# Patient Record
Sex: FEMALE | NOT HISPANIC OR LATINO | URBAN - METROPOLITAN AREA
[De-identification: names, ages, dates, MRNs, and addresses within clinical notes are randomized per-mention and may not be internally consistent; named-entity substitution may affect disease eponyms.]

---

## 2017-11-28 ENCOUNTER — EMERGENCY (EMERGENCY)
Facility: HOSPITAL | Age: 77
LOS: 1 days | Discharge: HOME | End: 2017-11-28
Attending: EMERGENCY MEDICINE | Admitting: EMERGENCY MEDICINE
Payer: MEDICARE

## 2017-11-28 VITALS
HEART RATE: 92 BPM | TEMPERATURE: 98 F | DIASTOLIC BLOOD PRESSURE: 54 MMHG | RESPIRATION RATE: 18 BRPM | OXYGEN SATURATION: 100 % | SYSTOLIC BLOOD PRESSURE: 124 MMHG

## 2017-11-28 VITALS
OXYGEN SATURATION: 98 % | DIASTOLIC BLOOD PRESSURE: 69 MMHG | SYSTOLIC BLOOD PRESSURE: 98 MMHG | HEART RATE: 94 BPM | TEMPERATURE: 98 F | RESPIRATION RATE: 18 BRPM

## 2017-11-28 LAB
ALBUMIN SERPL ELPH-MCNC: 2 G/DL — LOW (ref 3.5–5)
ALP SERPL-CCNC: 342 U/L — HIGH (ref 40–120)
ALT FLD-CCNC: 48 U/L DA — SIGNIFICANT CHANGE UP (ref 10–60)
ANION GAP SERPL CALC-SCNC: 8 MMOL/L — SIGNIFICANT CHANGE UP (ref 5–17)
ANISOCYTOSIS BLD QL: SLIGHT — SIGNIFICANT CHANGE UP
APPEARANCE UR: CLEAR — SIGNIFICANT CHANGE UP
AST SERPL-CCNC: 129 U/L — HIGH (ref 10–40)
BACTERIA # UR AUTO: ABNORMAL /HPF
BILIRUB SERPL-MCNC: 0.9 MG/DL — SIGNIFICANT CHANGE UP (ref 0.2–1.2)
BILIRUB UR-MCNC: ABNORMAL
BUN SERPL-MCNC: 20 MG/DL — HIGH (ref 7–18)
CALCIUM SERPL-MCNC: 8.6 MG/DL — SIGNIFICANT CHANGE UP (ref 8.4–10.5)
CHLORIDE SERPL-SCNC: 104 MMOL/L — SIGNIFICANT CHANGE UP (ref 96–108)
CO2 SERPL-SCNC: 24 MMOL/L — SIGNIFICANT CHANGE UP (ref 22–31)
COLOR SPEC: ABNORMAL
COMMENT - URINE: SIGNIFICANT CHANGE UP
COMMENT - URINE: SIGNIFICANT CHANGE UP
CREAT SERPL-MCNC: 0.62 MG/DL — SIGNIFICANT CHANGE UP (ref 0.5–1.3)
DIFF PNL FLD: ABNORMAL
EPI CELLS # UR: SIGNIFICANT CHANGE UP /HPF
GLUCOSE SERPL-MCNC: 105 MG/DL — HIGH (ref 70–99)
GLUCOSE UR QL: NEGATIVE — SIGNIFICANT CHANGE UP
GRAN CASTS # UR COMP ASSIST: ABNORMAL /LPF
HCT VFR BLD CALC: 32.3 % — LOW (ref 34.5–45)
HGB BLD-MCNC: 10.3 G/DL — LOW (ref 11.5–15.5)
KETONES UR-MCNC: ABNORMAL
LACTATE SERPL-SCNC: 1.2 MMOL/L — SIGNIFICANT CHANGE UP (ref 0.7–2)
LEUKOCYTE ESTERASE UR-ACNC: ABNORMAL
LYMPHOCYTES # BLD AUTO: 4 % — LOW (ref 13–44)
MANUAL DIF COMMENT BLD-IMP: SIGNIFICANT CHANGE UP
MCHC RBC-ENTMCNC: 28.6 PG — SIGNIFICANT CHANGE UP (ref 27–34)
MCHC RBC-ENTMCNC: 31.9 GM/DL — LOW (ref 32–36)
MCV RBC AUTO: 89.7 FL — SIGNIFICANT CHANGE UP (ref 80–100)
MONOCYTES NFR BLD AUTO: 4 % — SIGNIFICANT CHANGE UP (ref 2–14)
NEUTROPHILS NFR BLD AUTO: 90 % — HIGH (ref 43–77)
NEUTS BAND # BLD: 1 % — SIGNIFICANT CHANGE UP (ref 0–8)
NITRITE UR-MCNC: NEGATIVE — SIGNIFICANT CHANGE UP
PH UR: 6 — SIGNIFICANT CHANGE UP (ref 5–8)
PLAT MORPH BLD: NORMAL — SIGNIFICANT CHANGE UP
PLATELET # BLD AUTO: 635 K/UL — HIGH (ref 150–400)
POIKILOCYTOSIS BLD QL AUTO: SLIGHT — SIGNIFICANT CHANGE UP
POTASSIUM SERPL-MCNC: 3.9 MMOL/L — SIGNIFICANT CHANGE UP (ref 3.5–5.3)
POTASSIUM SERPL-SCNC: 3.9 MMOL/L — SIGNIFICANT CHANGE UP (ref 3.5–5.3)
PROT SERPL-MCNC: 6.7 G/DL — SIGNIFICANT CHANGE UP (ref 6–8.3)
PROT UR-MCNC: 30 MG/DL
RBC # BLD: 3.6 M/UL — LOW (ref 3.8–5.2)
RBC # FLD: 13.6 % — SIGNIFICANT CHANGE UP (ref 10.3–14.5)
RBC BLD AUTO: ABNORMAL
RBC CASTS # UR COMP ASSIST: SIGNIFICANT CHANGE UP /HPF (ref 0–2)
SODIUM SERPL-SCNC: 136 MMOL/L — SIGNIFICANT CHANGE UP (ref 135–145)
SP GR SPEC: 1.01 — SIGNIFICANT CHANGE UP (ref 1.01–1.02)
UROBILINOGEN FLD QL: 4
VARIANT LYMPHS # BLD: 1 % — SIGNIFICANT CHANGE UP (ref 0–6)
WBC # BLD: 24.4 K/UL — HIGH (ref 3.8–10.5)
WBC # FLD AUTO: 24.4 K/UL — HIGH (ref 3.8–10.5)
WBC UR QL: ABNORMAL /HPF (ref 0–5)

## 2017-11-28 PROCEDURE — 96374 THER/PROPH/DIAG INJ IV PUSH: CPT

## 2017-11-28 PROCEDURE — 99284 EMERGENCY DEPT VISIT MOD MDM: CPT

## 2017-11-28 PROCEDURE — 99284 EMERGENCY DEPT VISIT MOD MDM: CPT | Mod: 25

## 2017-11-28 PROCEDURE — 87040 BLOOD CULTURE FOR BACTERIA: CPT

## 2017-11-28 PROCEDURE — 85027 COMPLETE CBC AUTOMATED: CPT

## 2017-11-28 PROCEDURE — 80053 COMPREHEN METABOLIC PANEL: CPT

## 2017-11-28 PROCEDURE — 83605 ASSAY OF LACTIC ACID: CPT

## 2017-11-28 PROCEDURE — 71045 X-RAY EXAM CHEST 1 VIEW: CPT

## 2017-11-28 PROCEDURE — 71010: CPT | Mod: 26

## 2017-11-28 PROCEDURE — 81001 URINALYSIS AUTO W/SCOPE: CPT

## 2017-11-28 PROCEDURE — 87086 URINE CULTURE/COLONY COUNT: CPT

## 2017-11-28 PROCEDURE — 93005 ELECTROCARDIOGRAM TRACING: CPT

## 2017-11-28 RX ORDER — SODIUM CHLORIDE 9 MG/ML
2000 INJECTION INTRAMUSCULAR; INTRAVENOUS; SUBCUTANEOUS ONCE
Qty: 0 | Refills: 0 | Status: COMPLETED | OUTPATIENT
Start: 2017-11-28 | End: 2017-11-28

## 2017-11-28 RX ORDER — CEFTRIAXONE 500 MG/1
1 INJECTION, POWDER, FOR SOLUTION INTRAMUSCULAR; INTRAVENOUS ONCE
Qty: 0 | Refills: 0 | Status: COMPLETED | OUTPATIENT
Start: 2017-11-28 | End: 2017-11-28

## 2017-11-28 RX ORDER — SODIUM CHLORIDE 9 MG/ML
1000 INJECTION INTRAMUSCULAR; INTRAVENOUS; SUBCUTANEOUS ONCE
Qty: 0 | Refills: 0 | Status: COMPLETED | OUTPATIENT
Start: 2017-11-28 | End: 2017-11-28

## 2017-11-28 RX ORDER — CEFUROXIME AXETIL 250 MG
1 TABLET ORAL
Qty: 12 | Refills: 0 | OUTPATIENT
Start: 2017-11-28 | End: 2017-12-04

## 2017-11-28 RX ADMIN — SODIUM CHLORIDE 1000 MILLILITER(S): 9 INJECTION INTRAMUSCULAR; INTRAVENOUS; SUBCUTANEOUS at 15:06

## 2017-11-28 RX ADMIN — SODIUM CHLORIDE 2000 MILLILITER(S): 9 INJECTION INTRAMUSCULAR; INTRAVENOUS; SUBCUTANEOUS at 12:45

## 2017-11-28 RX ADMIN — CEFTRIAXONE 100 GRAM(S): 500 INJECTION, POWDER, FOR SOLUTION INTRAMUSCULAR; INTRAVENOUS at 15:06

## 2017-11-28 NOTE — ED PROVIDER NOTE - PROGRESS NOTE DETAILS
signed out to me. UTI with hypotension corrected with hydration and ceftriaxone x 1 dose. Re-eval by me. Patient asymptomatic and feels at baseline. d/c with 6 more days of ceftin.

## 2017-11-28 NOTE — ED ADULT NURSE NOTE - CAS EDN DISCHARGE ASSESSMENT
Awake/No adverse reaction to first time med in ED/Symptoms improved/Alert and oriented to person, place and time

## 2017-11-28 NOTE — ED ADULT TRIAGE NOTE - CHIEF COMPLAINT QUOTE
via ambulance sent from pmd office for dehydration , hypotension . daughter reports pt w/ decreased appetite , dry mouth  for 2 weeks

## 2017-11-28 NOTE — ED PROVIDER NOTE - OBJECTIVE STATEMENT
78 y/o F pt w/ PMHx of HLD, hypothyroidism, migraines, presents to ED c/o dehydration and hypotension x 2 weeks.  Pt reports she wasn't feeling well 3 weeks ago, was prescribed Prednisone, but has had symptoms since. Pt denies fever, chills, nausea, vomiting, diarrhea, or any other complaints. NKDA.

## 2017-11-29 LAB
CULTURE RESULTS: SIGNIFICANT CHANGE UP
SPECIMEN SOURCE: SIGNIFICANT CHANGE UP

## 2017-12-04 ENCOUNTER — INPATIENT (INPATIENT)
Facility: HOSPITAL | Age: 77
LOS: 1 days | Discharge: ANOTHER HOSPITAL | DRG: 871 | End: 2017-12-06
Attending: HOSPITALIST | Admitting: HOSPITALIST
Payer: MEDICARE

## 2017-12-04 VITALS
RESPIRATION RATE: 16 BRPM | TEMPERATURE: 98 F | SYSTOLIC BLOOD PRESSURE: 94 MMHG | DIASTOLIC BLOOD PRESSURE: 60 MMHG | HEART RATE: 95 BPM | WEIGHT: 115.08 LBS | HEIGHT: 61 IN | OXYGEN SATURATION: 99 %

## 2017-12-04 DIAGNOSIS — E78.5 HYPERLIPIDEMIA, UNSPECIFIED: ICD-10-CM

## 2017-12-04 DIAGNOSIS — R53.1 WEAKNESS: ICD-10-CM

## 2017-12-04 DIAGNOSIS — Z29.9 ENCOUNTER FOR PROPHYLACTIC MEASURES, UNSPECIFIED: ICD-10-CM

## 2017-12-04 DIAGNOSIS — I24.9 ACUTE ISCHEMIC HEART DISEASE, UNSPECIFIED: ICD-10-CM

## 2017-12-04 DIAGNOSIS — E03.9 HYPOTHYROIDISM, UNSPECIFIED: ICD-10-CM

## 2017-12-04 LAB
ALBUMIN SERPL ELPH-MCNC: 1.7 G/DL — LOW (ref 3.5–5)
ALP SERPL-CCNC: 372 U/L — HIGH (ref 40–120)
ALT FLD-CCNC: 15 U/L DA — SIGNIFICANT CHANGE UP (ref 10–60)
ANION GAP SERPL CALC-SCNC: 9 MMOL/L — SIGNIFICANT CHANGE UP (ref 5–17)
APPEARANCE UR: CLEAR — SIGNIFICANT CHANGE UP
APTT BLD: 28.9 SEC — SIGNIFICANT CHANGE UP (ref 27.5–37.4)
AST SERPL-CCNC: 76 U/L — HIGH (ref 10–40)
BILIRUB SERPL-MCNC: 0.4 MG/DL — SIGNIFICANT CHANGE UP (ref 0.2–1.2)
BILIRUB UR-MCNC: NEGATIVE — SIGNIFICANT CHANGE UP
BUN SERPL-MCNC: 14 MG/DL — SIGNIFICANT CHANGE UP (ref 7–18)
CALCIUM SERPL-MCNC: 7.8 MG/DL — LOW (ref 8.4–10.5)
CHLORIDE SERPL-SCNC: 105 MMOL/L — SIGNIFICANT CHANGE UP (ref 96–108)
CK MB BLD-MCNC: <4.3 % — HIGH (ref 0–3.5)
CK MB CFR SERPL CALC: <1 NG/ML — SIGNIFICANT CHANGE UP (ref 0–3.6)
CK SERPL-CCNC: 23 U/L — SIGNIFICANT CHANGE UP (ref 21–215)
CO2 SERPL-SCNC: 23 MMOL/L — SIGNIFICANT CHANGE UP (ref 22–31)
COLOR SPEC: YELLOW — SIGNIFICANT CHANGE UP
CREAT SERPL-MCNC: 0.82 MG/DL — SIGNIFICANT CHANGE UP (ref 0.5–1.3)
CULTURE RESULTS: SIGNIFICANT CHANGE UP
CULTURE RESULTS: SIGNIFICANT CHANGE UP
DIFF PNL FLD: ABNORMAL
GLUCOSE SERPL-MCNC: 147 MG/DL — HIGH (ref 70–99)
GLUCOSE UR QL: NEGATIVE — SIGNIFICANT CHANGE UP
HCT VFR BLD CALC: 34.1 % — LOW (ref 34.5–45)
HGB BLD-MCNC: 10.6 G/DL — LOW (ref 11.5–15.5)
INR BLD: 1.36 RATIO — HIGH (ref 0.88–1.16)
KETONES UR-MCNC: NEGATIVE — SIGNIFICANT CHANGE UP
LACTATE SERPL-SCNC: 1.8 MMOL/L — SIGNIFICANT CHANGE UP (ref 0.7–2)
LEUKOCYTE ESTERASE UR-ACNC: ABNORMAL
LYMPHOCYTES # BLD AUTO: 6 % — LOW (ref 13–44)
MCHC RBC-ENTMCNC: 27.4 PG — SIGNIFICANT CHANGE UP (ref 27–34)
MCHC RBC-ENTMCNC: 31.1 GM/DL — LOW (ref 32–36)
MCV RBC AUTO: 87.9 FL — SIGNIFICANT CHANGE UP (ref 80–100)
MONOCYTES NFR BLD AUTO: 7 % — SIGNIFICANT CHANGE UP (ref 2–14)
NEUTROPHILS NFR BLD AUTO: 86 % — HIGH (ref 43–77)
NITRITE UR-MCNC: NEGATIVE — SIGNIFICANT CHANGE UP
PH UR: 6 — SIGNIFICANT CHANGE UP (ref 5–8)
PLATELET # BLD AUTO: 194 K/UL — SIGNIFICANT CHANGE UP (ref 150–400)
POTASSIUM SERPL-MCNC: 3.9 MMOL/L — SIGNIFICANT CHANGE UP (ref 3.5–5.3)
POTASSIUM SERPL-SCNC: 3.9 MMOL/L — SIGNIFICANT CHANGE UP (ref 3.5–5.3)
PROT SERPL-MCNC: 6.3 G/DL — SIGNIFICANT CHANGE UP (ref 6–8.3)
PROT UR-MCNC: 30 MG/DL
PROTHROM AB SERPL-ACNC: 14.9 SEC — HIGH (ref 9.8–12.7)
RBC # BLD: 3.88 M/UL — SIGNIFICANT CHANGE UP (ref 3.8–5.2)
RBC # FLD: 14.9 % — HIGH (ref 10.3–14.5)
SODIUM SERPL-SCNC: 137 MMOL/L — SIGNIFICANT CHANGE UP (ref 135–145)
SP GR SPEC: 1.01 — SIGNIFICANT CHANGE UP (ref 1.01–1.02)
SPECIMEN SOURCE: SIGNIFICANT CHANGE UP
SPECIMEN SOURCE: SIGNIFICANT CHANGE UP
TROPONIN I SERPL-MCNC: 0.61 NG/ML — HIGH (ref 0–0.04)
TROPONIN I SERPL-MCNC: 0.65 NG/ML — HIGH (ref 0–0.04)
UROBILINOGEN FLD QL: NEGATIVE — SIGNIFICANT CHANGE UP
WBC # BLD: 26.6 K/UL — HIGH (ref 3.8–10.5)
WBC # FLD AUTO: 26.6 K/UL — HIGH (ref 3.8–10.5)

## 2017-12-04 PROCEDURE — 74176 CT ABD & PELVIS W/O CONTRAST: CPT | Mod: 26

## 2017-12-04 PROCEDURE — 71010: CPT | Mod: 26

## 2017-12-04 PROCEDURE — 71250 CT THORAX DX C-: CPT | Mod: 26

## 2017-12-04 PROCEDURE — 99285 EMERGENCY DEPT VISIT HI MDM: CPT

## 2017-12-04 PROCEDURE — 99223 1ST HOSP IP/OBS HIGH 75: CPT | Mod: GC

## 2017-12-04 PROCEDURE — 70450 CT HEAD/BRAIN W/O DYE: CPT | Mod: 26

## 2017-12-04 RX ORDER — VANCOMYCIN HCL 1 G
1000 VIAL (EA) INTRAVENOUS EVERY 24 HOURS
Qty: 0 | Refills: 0 | Status: DISCONTINUED | OUTPATIENT
Start: 2017-12-04 | End: 2017-12-06

## 2017-12-04 RX ORDER — CEFTRIAXONE 500 MG/1
INJECTION, POWDER, FOR SOLUTION INTRAMUSCULAR; INTRAVENOUS
Qty: 0 | Refills: 0 | Status: DISCONTINUED | OUTPATIENT
Start: 2017-12-04 | End: 2017-12-04

## 2017-12-04 RX ORDER — LEVOTHYROXINE SODIUM 125 MCG
1 TABLET ORAL
Qty: 0 | Refills: 0 | COMMUNITY

## 2017-12-04 RX ORDER — LEVOTHYROXINE SODIUM 125 MCG
50 TABLET ORAL DAILY
Qty: 0 | Refills: 0 | Status: DISCONTINUED | OUTPATIENT
Start: 2017-12-04 | End: 2017-12-06

## 2017-12-04 RX ORDER — SODIUM CHLORIDE 9 MG/ML
1000 INJECTION INTRAMUSCULAR; INTRAVENOUS; SUBCUTANEOUS ONCE
Qty: 0 | Refills: 0 | Status: COMPLETED | OUTPATIENT
Start: 2017-12-04 | End: 2017-12-04

## 2017-12-04 RX ORDER — AZITHROMYCIN 500 MG/1
TABLET, FILM COATED ORAL
Qty: 0 | Refills: 0 | Status: DISCONTINUED | OUTPATIENT
Start: 2017-12-04 | End: 2017-12-04

## 2017-12-04 RX ORDER — AZITHROMYCIN 500 MG/1
500 TABLET, FILM COATED ORAL ONCE
Qty: 0 | Refills: 0 | Status: DISCONTINUED | OUTPATIENT
Start: 2017-12-04 | End: 2017-12-04

## 2017-12-04 RX ORDER — PIPERACILLIN AND TAZOBACTAM 4; .5 G/20ML; G/20ML
3.38 INJECTION, POWDER, LYOPHILIZED, FOR SOLUTION INTRAVENOUS EVERY 8 HOURS
Qty: 0 | Refills: 0 | Status: DISCONTINUED | OUTPATIENT
Start: 2017-12-04 | End: 2017-12-06

## 2017-12-04 RX ORDER — ATORVASTATIN CALCIUM 80 MG/1
1 TABLET, FILM COATED ORAL
Qty: 0 | Refills: 0 | COMMUNITY

## 2017-12-04 RX ORDER — ACETAMINOPHEN 500 MG
650 TABLET ORAL ONCE
Qty: 0 | Refills: 0 | Status: COMPLETED | OUTPATIENT
Start: 2017-12-04 | End: 2017-12-04

## 2017-12-04 RX ORDER — SODIUM CHLORIDE 9 MG/ML
2000 INJECTION INTRAMUSCULAR; INTRAVENOUS; SUBCUTANEOUS ONCE
Qty: 0 | Refills: 0 | Status: COMPLETED | OUTPATIENT
Start: 2017-12-04 | End: 2017-12-04

## 2017-12-04 RX ORDER — CEFTRIAXONE 500 MG/1
1 INJECTION, POWDER, FOR SOLUTION INTRAMUSCULAR; INTRAVENOUS ONCE
Qty: 0 | Refills: 0 | Status: DISCONTINUED | OUTPATIENT
Start: 2017-12-04 | End: 2017-12-04

## 2017-12-04 RX ORDER — SODIUM CHLORIDE 9 MG/ML
1000 INJECTION, SOLUTION INTRAVENOUS
Qty: 0 | Refills: 0 | Status: DISCONTINUED | OUTPATIENT
Start: 2017-12-04 | End: 2017-12-05

## 2017-12-04 RX ORDER — ATORVASTATIN CALCIUM 80 MG/1
40 TABLET, FILM COATED ORAL AT BEDTIME
Qty: 0 | Refills: 0 | Status: DISCONTINUED | OUTPATIENT
Start: 2017-12-04 | End: 2017-12-06

## 2017-12-04 RX ORDER — ASPIRIN/CALCIUM CARB/MAGNESIUM 324 MG
81 TABLET ORAL DAILY
Qty: 0 | Refills: 0 | Status: DISCONTINUED | OUTPATIENT
Start: 2017-12-04 | End: 2017-12-06

## 2017-12-04 RX ADMIN — Medication 650 MILLIGRAM(S): at 23:37

## 2017-12-04 RX ADMIN — ATORVASTATIN CALCIUM 40 MILLIGRAM(S): 80 TABLET, FILM COATED ORAL at 22:33

## 2017-12-04 RX ADMIN — SODIUM CHLORIDE 2000 MILLILITER(S): 9 INJECTION INTRAMUSCULAR; INTRAVENOUS; SUBCUTANEOUS at 15:34

## 2017-12-04 RX ADMIN — SODIUM CHLORIDE 1000 MILLILITER(S): 9 INJECTION INTRAMUSCULAR; INTRAVENOUS; SUBCUTANEOUS at 13:04

## 2017-12-04 RX ADMIN — PIPERACILLIN AND TAZOBACTAM 12.5 GRAM(S): 4; .5 INJECTION, POWDER, LYOPHILIZED, FOR SOLUTION INTRAVENOUS at 22:33

## 2017-12-04 NOTE — ED PROVIDER NOTE - NEUROLOGICAL, MLM
Alert and oriented, no focal deficits, no motor or sensory deficits. Alert and oriented, no focal deficits, no motor or sensory deficits. no meningeal signs.

## 2017-12-04 NOTE — H&P ADULT - PROBLEM SELECTOR PLAN 2
-Patient denies chest pain and SOB but troponin are mildly elevated on labs. T1 elevated, follow troponin. EKG shows Sinus tachycardia at 98 bpm.   -Started aspirin and continue statin. Hold B Blocker given low BP.

## 2017-12-04 NOTE — ED PROVIDER NOTE - PROGRESS NOTE DETAILS
Pt with generalized weakness, no appetite, abd soft, nt, no meningeal signs, elevated wbc, unclear source for elevated wbc, previous cultures noted. Will admit. Pt with generalized weakness, no appetite, abd soft, nt, no meningeal signs, elevated wbc, unclear source for elevated wbc, (was on steroids) previous cultures noted. Will admit.

## 2017-12-04 NOTE — ED PROVIDER NOTE - CARE PLAN
Principal Discharge DX:	ACS (acute coronary syndrome)  Secondary Diagnosis:	Weakness  Secondary Diagnosis:	Dehydration

## 2017-12-04 NOTE — H&P ADULT - ASSESSMENT
77 F with pmh of Asthma, hld, migraines and UTI recently treated with PO antibiotics presented to ED with generalized fatigue and malaise from last 3 days.

## 2017-12-04 NOTE — H&P ADULT - PROBLEM SELECTOR PLAN 3
4 -Patient presented with weakness and lethargy. CT scan head was done and it showed age indeterminate infarct.   -Follow MRI brain -Patient presented with weakness and lethargy. CT scan head was done and it showed age indeterminate infarct. Frontal lobe infarct could be causing all the symptoms including personality changes and poor appetite.   -Follow MRI brain

## 2017-12-04 NOTE — ED PROVIDER NOTE - OBJECTIVE STATEMENT
78 y/o F w/ a PMhx of asthma, HLD, and migraines presents to the ED c/o weakness and lightheadedness since today. Pt was in the ED six days ago, she was diagnosed w/ a UTI and was given abx and 3 bags of IV. Daughter is at the bedside and states that pt has had a decreased appetite since last ER visit and has been slightly disoriented. Pt also reports  L foot numbness. Pt denies fever, chills, and any other complaints. NKDA.

## 2017-12-04 NOTE — ED ADULT NURSE NOTE - ED STAT RN HANDOFF DETAILS
Pt remains alert and verbally responsive daughter at bed side assisted to bathroom telemetry in progress attached to Box C endorsed to Ajit JACINTO

## 2017-12-04 NOTE — H&P ADULT - PROBLEM SELECTOR PLAN 1
Patient presented with weakness and lethargy associated with decreased appetite and low BP. Patient clinically dehydrated on physical examination. S/p 3l iv fluids bolus in ED. Continue D5/NS at 40 ml/hr. Patient presented with weakness and lethargy associated with decreased appetite and low BP. Patient clinically dehydrated on physical examination. S/p 3l iv fluids bolus in ED. Continue D5/NS at 40 ml/hr.  -Given the leucocytosis and hypotension, will start antibiotics at this point. Follow Blood cultures. Procalcitonin level. Follow MM work up including SPEP, UPEP and immunofixation.

## 2017-12-04 NOTE — H&P ADULT - ATTENDING COMMENTS
Patient was seen and examined at bedside, daughter at her side, agree with above as discussed with resident  As per daughter patient is mildly altered and she keeps repeating herself sometime or answer wrong the questions which is not her usual.  Condition started since around 7-10days after a bus tour, after which patient felt weak, condition worsened and she came to the ER on 11/28 where she was sent home on antibiotics for UTI. her wbc at the time was 24K. 3 weeks prior to that she went to her PCP and has muscle pain and was started on Prednisone. As per patient she was supposed to finish it today, but it's unclear why she was giving it.  She was told that she was very dry and her daughter notes that she was sleepy hence did not drink or eat well since her symtpoms.  Physical exam:  General: elderly dehydrated lady.  HEENT: Neck is supple  Heart: S1, S2 normal, tachy, irregular  Abdomen: NT, ND  Chest: clear  LE: No LE edema  LAbs reviewed: WBC: 26,   < from: CT Head No Cont (12.04.17 @ 14:42) >    Impression: No acute intracranial hemorrhage.     Small age indeterminate territorial infarct in the right high frontal   lobe. If clinically indicated, brain MR may be pursued for further   evaluation.    Mild chronic small vessel ischemic disease.    < end of copied text >  EKG: sinus tacchy      A/P  1- Encephalopathy: suspecting a stroke vs infection  CT head revealing an age indeterminate infarct in the right frontal lobe.   it can explain her mental status changes,   will do MRI/MRA to assess if new infarcts.  permissive hypertension.  Admit to tele    2- Leukocytosis: ? side effect from Corticosteroid, but since no source of infection could be found, will give broad spectrum IV antibiotics   1 dose of Vanco and zosyn and f.u.    3- Elevated troponin: suspecting demand ischemia in light of sepsis: trend troponin and  cardio consult  rest as above.

## 2017-12-04 NOTE — ED ADULT NURSE NOTE - OBJECTIVE STATEMENT
presented with  c/o generalized  weakness and lightheadedness x today, also reports poor appetite and fluid intake denies fever/chills C/P SOB daughter at bed side

## 2017-12-04 NOTE — H&P ADULT - HISTORY OF PRESENT ILLNESS
77 F with pmh of Asthma, hld, migraines and UTI recently treated with PO antibiotics presented to ED with generalized fatigue and malaise from last 3 days. Patient states that she recently had a ED visit and found to have UTI, was sent home on PO antibiotics after iv hydration. Patient had visit to PCP for R shoulder pain and was treated with PO prednisone.   Denies fever, SOB, nausea, vomiting, diarrhea, constipation, abdominal pain, chest pain, headache or focal neurological deficit.     SH: Non smoker, non alcoholic, denies illicit drug use.   Fh: Htn in Mother.     Ed Course: Patient was hypotensive to 94/60 s/p 3 l iv fluid bolus, BP improved to 118/63.

## 2017-12-05 DIAGNOSIS — D72.829 ELEVATED WHITE BLOOD CELL COUNT, UNSPECIFIED: ICD-10-CM

## 2017-12-05 DIAGNOSIS — I63.9 CEREBRAL INFARCTION, UNSPECIFIED: ICD-10-CM

## 2017-12-05 DIAGNOSIS — G93.40 ENCEPHALOPATHY, UNSPECIFIED: ICD-10-CM

## 2017-12-05 LAB
24R-OH-CALCIDIOL SERPL-MCNC: 33.8 NG/ML — SIGNIFICANT CHANGE UP (ref 30–80)
ALBUMIN SERPL ELPH-MCNC: 1.5 G/DL — LOW (ref 3.5–5)
ALP SERPL-CCNC: 309 U/L — HIGH (ref 40–120)
ALT FLD-CCNC: 13 U/L DA — SIGNIFICANT CHANGE UP (ref 10–60)
ANION GAP SERPL CALC-SCNC: 8 MMOL/L — SIGNIFICANT CHANGE UP (ref 5–17)
AST SERPL-CCNC: 49 U/L — HIGH (ref 10–40)
BASOPHILS # BLD AUTO: 0.1 K/UL — SIGNIFICANT CHANGE UP (ref 0–0.2)
BASOPHILS NFR BLD AUTO: 0.4 % — SIGNIFICANT CHANGE UP (ref 0–2)
BILIRUB SERPL-MCNC: 0.4 MG/DL — SIGNIFICANT CHANGE UP (ref 0.2–1.2)
BUN SERPL-MCNC: 12 MG/DL — SIGNIFICANT CHANGE UP (ref 7–18)
CALCIUM SERPL-MCNC: 7.8 MG/DL — LOW (ref 8.4–10.5)
CHLORIDE SERPL-SCNC: 111 MMOL/L — HIGH (ref 96–108)
CO2 SERPL-SCNC: 21 MMOL/L — LOW (ref 22–31)
CREAT SERPL-MCNC: 0.68 MG/DL — SIGNIFICANT CHANGE UP (ref 0.5–1.3)
EOSINOPHIL # BLD AUTO: 0.2 K/UL — SIGNIFICANT CHANGE UP (ref 0–0.5)
EOSINOPHIL NFR BLD AUTO: 0.8 % — SIGNIFICANT CHANGE UP (ref 0–6)
FERRITIN SERPL-MCNC: 327 NG/ML — HIGH (ref 15–150)
FOLATE SERPL-MCNC: 19.5 NG/ML — SIGNIFICANT CHANGE UP (ref 4.8–24.2)
GLUCOSE SERPL-MCNC: 118 MG/DL — HIGH (ref 70–99)
HCT VFR BLD CALC: 30.8 % — LOW (ref 34.5–45)
HCT VFR BLD CALC: 34 % — LOW (ref 34.5–45)
HGB BLD-MCNC: 10.6 G/DL — LOW (ref 11.5–15.5)
HGB BLD-MCNC: 9.7 G/DL — LOW (ref 11.5–15.5)
IRON SATN MFR SERPL: 15 UG/DL — LOW (ref 40–150)
IRON SATN MFR SERPL: 9 % — LOW (ref 15–50)
LYMPHOCYTES # BLD AUTO: 1.2 K/UL — SIGNIFICANT CHANGE UP (ref 1–3.3)
LYMPHOCYTES # BLD AUTO: 5.1 % — LOW (ref 13–44)
MCHC RBC-ENTMCNC: 28 PG — SIGNIFICANT CHANGE UP (ref 27–34)
MCHC RBC-ENTMCNC: 28.4 PG — SIGNIFICANT CHANGE UP (ref 27–34)
MCHC RBC-ENTMCNC: 31.2 GM/DL — LOW (ref 32–36)
MCHC RBC-ENTMCNC: 31.6 GM/DL — LOW (ref 32–36)
MCV RBC AUTO: 89.9 FL — SIGNIFICANT CHANGE UP (ref 80–100)
MCV RBC AUTO: 90.2 FL — SIGNIFICANT CHANGE UP (ref 80–100)
MONOCYTES # BLD AUTO: 0.9 K/UL — SIGNIFICANT CHANGE UP (ref 0–0.9)
MONOCYTES NFR BLD AUTO: 3.8 % — SIGNIFICANT CHANGE UP (ref 2–14)
NEUTROPHILS # BLD AUTO: 21.5 K/UL — HIGH (ref 1.8–7.4)
NEUTROPHILS NFR BLD AUTO: 89.9 % — HIGH (ref 43–77)
PLATELET # BLD AUTO: 220 K/UL — SIGNIFICANT CHANGE UP (ref 150–400)
PLATELET # BLD AUTO: 240 K/UL — SIGNIFICANT CHANGE UP (ref 150–400)
POTASSIUM SERPL-MCNC: 3.7 MMOL/L — SIGNIFICANT CHANGE UP (ref 3.5–5.3)
POTASSIUM SERPL-SCNC: 3.7 MMOL/L — SIGNIFICANT CHANGE UP (ref 3.5–5.3)
PROT SERPL-MCNC: 5.2 G/DL — LOW (ref 6–8.3)
PROT SERPL-MCNC: 5.2 G/DL — LOW (ref 6–8.3)
PROT SERPL-MCNC: 5.8 G/DL — LOW (ref 6–8.3)
RBC # BLD: 3.41 M/UL — LOW (ref 3.8–5.2)
RBC # BLD: 3.78 M/UL — LOW (ref 3.8–5.2)
RBC # FLD: 15.1 % — HIGH (ref 10.3–14.5)
RBC # FLD: 15.4 % — HIGH (ref 10.3–14.5)
SODIUM SERPL-SCNC: 140 MMOL/L — SIGNIFICANT CHANGE UP (ref 135–145)
TIBC SERPL-MCNC: 168 UG/DL — LOW (ref 250–450)
UIBC SERPL-MCNC: 153 UG/DL — SIGNIFICANT CHANGE UP (ref 110–370)
VIT B12 SERPL-MCNC: 819 PG/ML — SIGNIFICANT CHANGE UP (ref 243–894)
WBC # BLD: 20.8 K/UL — HIGH (ref 3.8–10.5)
WBC # BLD: 23.9 K/UL — HIGH (ref 3.8–10.5)
WBC # FLD AUTO: 20.8 K/UL — HIGH (ref 3.8–10.5)
WBC # FLD AUTO: 23.9 K/UL — HIGH (ref 3.8–10.5)

## 2017-12-05 PROCEDURE — 99233 SBSQ HOSP IP/OBS HIGH 50: CPT | Mod: GC

## 2017-12-05 PROCEDURE — 99223 1ST HOSP IP/OBS HIGH 75: CPT

## 2017-12-05 RX ORDER — HYDROCORTISONE 20 MG
50 TABLET ORAL EVERY 8 HOURS
Qty: 0 | Refills: 0 | Status: DISCONTINUED | OUTPATIENT
Start: 2017-12-05 | End: 2017-12-06

## 2017-12-05 RX ORDER — IPRATROPIUM/ALBUTEROL SULFATE 18-103MCG
3 AEROSOL WITH ADAPTER (GRAM) INHALATION ONCE
Qty: 0 | Refills: 0 | Status: COMPLETED | OUTPATIENT
Start: 2017-12-05 | End: 2017-12-05

## 2017-12-05 RX ORDER — HYDROCORTISONE 20 MG
50 TABLET ORAL EVERY 8 HOURS
Qty: 0 | Refills: 0 | Status: DISCONTINUED | OUTPATIENT
Start: 2017-12-05 | End: 2017-12-05

## 2017-12-05 RX ORDER — IPRATROPIUM/ALBUTEROL SULFATE 18-103MCG
3 AEROSOL WITH ADAPTER (GRAM) INHALATION EVERY 6 HOURS
Qty: 0 | Refills: 0 | Status: DISCONTINUED | OUTPATIENT
Start: 2017-12-05 | End: 2017-12-06

## 2017-12-05 RX ORDER — HEPARIN SODIUM 5000 [USP'U]/ML
5000 INJECTION INTRAVENOUS; SUBCUTANEOUS EVERY 8 HOURS
Qty: 0 | Refills: 0 | Status: DISCONTINUED | OUTPATIENT
Start: 2017-12-05 | End: 2017-12-06

## 2017-12-05 RX ADMIN — Medication 50 MILLIGRAM(S): at 18:19

## 2017-12-05 RX ADMIN — Medication 3 MILLILITER(S): at 11:30

## 2017-12-05 RX ADMIN — Medication 50 MICROGRAM(S): at 06:11

## 2017-12-05 RX ADMIN — PIPERACILLIN AND TAZOBACTAM 12.5 GRAM(S): 4; .5 INJECTION, POWDER, LYOPHILIZED, FOR SOLUTION INTRAVENOUS at 22:26

## 2017-12-05 RX ADMIN — Medication 3 MILLILITER(S): at 14:22

## 2017-12-05 RX ADMIN — Medication 81 MILLIGRAM(S): at 13:29

## 2017-12-05 RX ADMIN — Medication 3 MILLILITER(S): at 20:18

## 2017-12-05 RX ADMIN — SODIUM CHLORIDE 40 MILLILITER(S): 9 INJECTION, SOLUTION INTRAVENOUS at 00:13

## 2017-12-05 RX ADMIN — ATORVASTATIN CALCIUM 40 MILLIGRAM(S): 80 TABLET, FILM COATED ORAL at 22:25

## 2017-12-05 RX ADMIN — HEPARIN SODIUM 5000 UNIT(S): 5000 INJECTION INTRAVENOUS; SUBCUTANEOUS at 22:25

## 2017-12-05 RX ADMIN — PIPERACILLIN AND TAZOBACTAM 12.5 GRAM(S): 4; .5 INJECTION, POWDER, LYOPHILIZED, FOR SOLUTION INTRAVENOUS at 06:11

## 2017-12-05 RX ADMIN — Medication 250 MILLIGRAM(S): at 00:12

## 2017-12-05 RX ADMIN — PIPERACILLIN AND TAZOBACTAM 12.5 GRAM(S): 4; .5 INJECTION, POWDER, LYOPHILIZED, FOR SOLUTION INTRAVENOUS at 13:29

## 2017-12-05 RX ADMIN — Medication 3 MILLILITER(S): at 01:29

## 2017-12-05 RX ADMIN — Medication 50 MILLIGRAM(S): at 22:25

## 2017-12-05 NOTE — PHYSICAL THERAPY INITIAL EVALUATION ADULT - GENERAL OBSERVATIONS, REHAB EVAL
Pt. found lying supine in NAD; c/o weakness. Pt. connected to IV and uses O2 via NC at 2L. Pt. on cardiac monitor. She is cooperative and motivated during eval.

## 2017-12-05 NOTE — PROGRESS NOTE ADULT - PROBLEM SELECTOR PLAN 2
Elevated Troponins  Likely demand ischemia  EKG Sinus Tachycardia  Telemetry monitoring Elevated Troponins  Likely demand ischemia  EKG Sinus Tachycardia  Telemetry monitoring: No events at the time

## 2017-12-05 NOTE — CONSULT NOTE ADULT - SUBJECTIVE AND OBJECTIVE BOX
CHIEF COMPLAINT: weakness    HPI: 76 yo F with HLD who presented with    PAST MEDICAL & SURGICAL HISTORY:  As above, also Rheumatoid arthritis, Migraines, Hypothyroid    Allergies    No Known Allergies      MEDICATIONS  (STANDING):  ALBUTerol/ipratropium for Nebulization 3 milliLiter(s) Nebulizer every 6 hours  ALBUTerol/ipratropium for Nebulization 3 milliLiter(s) Nebulizer once  aspirin  chewable 81 milliGRAM(s) Oral daily  atorvastatin 40 milliGRAM(s) Oral at bedtime  levothyroxine 50 MICROGram(s) Oral daily  piperacillin/tazobactam IVPB. 3.375 Gram(s) IV Intermittent every 8 hours  vancomycin  IVPB 1000 milliGRAM(s) IV Intermittent every 24 hours    MEDICATIONS  (PRN):      FAMILY HISTORY:  Family history of hypertension (Mother)    No family history of premature coronary artery disease or sudden cardiac death    SOCIAL HISTORY:  Smoking-  Alcohol-  Ilicit Drug use-    REVIEW OF SYSTEMS:  Constitutional: [ ] fever, [ ]weight loss,  [ ]fatigue  Eyes: [ ] visual changes  Respiratory: [ ]shortness of breath;  [ ] cough, [ ]wheezing, [ ]chills, [ ]hemoptysis  Cardiovascular: [ ] chest pain, [ ]palpitations, [ ]dizziness,  [ ]leg swelling  Gastrointestinal: [ ] abdominal pain, [ ]nausea, [ ]vomiting,  [ ]diarrhea   Genitourinary: [ ] dysuria, [ ] hematuria  Neurologic: [ ] headaches [ ] tremors  [ ] weakness [ ] lightheadedness  Skin: [ ] itching, [ ]burning, [ ] rashes  Endocrine: [ ] heat or cold intolerance  Musculoskeletal: [ ] joint pain or swelling; [ ] muscle, back, or extremity pain  Psychiatric: [ ] depression, [ ]anxiety, [ ]mood swings, or [ ]difficulty sleeping  Hematologic: [ ] easy bruising, [ ] bleeding gums       [ x] All others negative	  [ ] Unable to obtain    Vital Signs Last 24 Hrs  T(C): 36.8 (05 Dec 2017 11:33), Max: 38.3 (04 Dec 2017 22:21)  T(F): 98.2 (05 Dec 2017 11:33), Max: 101 (04 Dec 2017 22:21)  HR: 107 (05 Dec 2017 11:33) (86 - 112)  BP: 103/57 (05 Dec 2017 11:33) (98/51 - 119/57)  BP(mean): --  RR: 15 (05 Dec 2017 11:33) (15 - 21)  SpO2: 98% (05 Dec 2017 11:33) (98% - 100%)  I&O's Summary    04 Dec 2017 07:01  -  05 Dec 2017 07:00  --------------------------------------------------------  IN: 340 mL / OUT: 0 mL / NET: 340 mL    PHYSICAL EXAM:  General: No acute distress  HEENT: EOMI, PERRL  Neck: Supple, No JVD  Lungs: Clear to auscultation bilaterally; No rales or wheezing  Heart: Regular rate and rhythm; No murmurs, rubs, or gallops  Abdomen: Nontender, bowel sounds present  Extremities: No clubbing, cyanosis, or edema  Nervous system:  Alert & Oriented X3, no focal deficits  Psychiatric: Normal affect  Skin: No rashes or lesions      LABS:  12-05    140  |  111<H>  |  12  ----------------------------<  118<H>  3.7   |  21<L>  |  0.68    Ca    7.8<L>      05 Dec 2017 09:41  Phos  2.7     12-05  Mg     2.2     12-05    TPro  5.8<L>  /  Alb  1.5<L>  /  TBili  0.4  /  DBili  x   /  AST  49<H>  /  ALT  13  /  AlkPhos  309<H>  12-05    Creatinine Trend: 0.68<--, 0.82<--, 0.62<--                        9.7    20.8  )-----------( 220      ( 05 Dec 2017 09:41 )             30.8     PT/INR - ( 04 Dec 2017 12:25 )   PT: 14.9 sec;   INR: 1.36 ratio         PTT - ( 04 Dec 2017 12:25 )  PTT:28.9 sec    Lipid Panel:   Cardiac Enzymes: CARDIAC MARKERS ( 04 Dec 2017 18:37 )  0.606 ng/mL / x     / 23 U/L / x     / <1.0 ng/mL  CARDIAC MARKERS ( 04 Dec 2017 12:24 )  0.648 ng/mL / x     / x     / x     / x        RADIOLOGY: < from: Xray Chest 1 View AP/PA (12.04.17 @ 13:02) >  Impression: No evidence for pulmonary consolidation, pleural effusion or   pneumothorax.    The trachea is midline.    The cardiac silhouette is within normal limits.    Stable bilateral breast implants.      < end of copied text >    < from: CT Chest No Cont (12.04.17 @ 14:48) >  IMPRESSION: Mild interstitial pulmonary edema and trace bilateral pleural   effusions.    < end of copied text >  < from: CT Head No Cont (12.04.17 @ 14:42) >  Impression: No acute intracranial hemorrhage.     Small age indeterminate territorial infarct in the right high frontal   lobe. If clinically indicated, brain MR may be pursued for further   evaluation.    Mild chronic small vessel ischemic disease.    < end of copied text >      ECG [my interpretation]:    TELEMETRY:    ECHO: Pending CHIEF COMPLAINT: weakness    HPI: 78 yo F with HLD who presented with weakness and dyspnea. Patient reports she was moving furniture 2 weeks ago when she developed back and shoulder pain. She was then started on prednisone by her PMD for this, and felt worsening fatigue and lethargy ever since. Patient also reports she feels "dehydrated" and has been feeling short of breath for the past 2 days or so. Denies chest pain. Patient sometimes has palpitations, which she says she has had since childhood. The episodes usually last several seconds and terminate spontaneously. Prior to this episode, patient was able to go up and down stairs without any dyspnea, chest pain, or other symptoms.  No syncopal episodes.     PAST MEDICAL & SURGICAL HISTORY:  As above, also Rheumatoid arthritis, Migraines, Hypothyroid, asthma    Allergies    No Known Allergies      MEDICATIONS  (STANDING):  ALBUTerol/ipratropium for Nebulization 3 milliLiter(s) Nebulizer every 6 hours  ALBUTerol/ipratropium for Nebulization 3 milliLiter(s) Nebulizer once  aspirin  chewable 81 milliGRAM(s) Oral daily  atorvastatin 40 milliGRAM(s) Oral at bedtime  levothyroxine 50 MICROGram(s) Oral daily  piperacillin/tazobactam IVPB. 3.375 Gram(s) IV Intermittent every 8 hours  vancomycin  IVPB 1000 milliGRAM(s) IV Intermittent every 24 hours    MEDICATIONS  (PRN):      FAMILY HISTORY:  Family history of hypertension (Mother)    SOCIAL HISTORY:  Smoking-denies  Alcohol-denies  Ilicit Drug use-denies    REVIEW OF SYSTEMS:  Constitutional: [ ] fever, [ ]weight loss,  [ ]fatigue  Eyes: [ ] visual changes  Respiratory: [ ]shortness of breath;  [ ] cough, [ ]wheezing, [ ]chills, [ ]hemoptysis  Cardiovascular: [ ] chest pain, [ ]palpitations, [ ]dizziness,  [ ]leg swelling  Gastrointestinal: [ ] abdominal pain, [ ]nausea, [ ]vomiting,  [ ]diarrhea   Genitourinary: [ ] dysuria, [ ] hematuria  Neurologic: [ ] headaches [ ] tremors  [ ] weakness [ ] lightheadedness  Skin: [ ] itching, [ ]burning, [ ] rashes  Endocrine: [ ] heat or cold intolerance  Musculoskeletal: [ ] joint pain or swelling; [ ] muscle, back, or extremity pain  Psychiatric: [ ] depression, [ ]anxiety, [ ]mood swings, or [ ]difficulty sleeping  Hematologic: [ ] easy bruising, [ ] bleeding gums       [ x] All others negative	  [ ] Unable to obtain    Vital Signs Last 24 Hrs  T(C): 36.8 (05 Dec 2017 11:33), Max: 38.3 (04 Dec 2017 22:21)  T(F): 98.2 (05 Dec 2017 11:33), Max: 101 (04 Dec 2017 22:21)  HR: 107 (05 Dec 2017 11:33) (86 - 112)  BP: 103/57 (05 Dec 2017 11:33) (98/51 - 119/57)  BP(mean): --  RR: 15 (05 Dec 2017 11:33) (15 - 21)  SpO2: 98% (05 Dec 2017 11:33) (98% - 100%)  I&O's Summary    04 Dec 2017 07:01  -  05 Dec 2017 07:00  --------------------------------------------------------  IN: 340 mL / OUT: 0 mL / NET: 340 mL    PHYSICAL EXAM:  General: No acute distress  HEENT: EOMI, PERRL  Neck: Supple, No JVD  Lungs: coarse breath sounds bilaterally  Heart: Regular rate and rhythm; No murmurs, rubs, or gallops  Abdomen: Nontender, bowel sounds present  Extremities: No clubbing, cyanosis, or edema  Nervous system:  Alert & Oriented X3, no focal deficits  Psychiatric: Normal affect  Skin: No rashes or lesions      LABS:  12-05    140  |  111<H>  |  12  ----------------------------<  118<H>  3.7   |  21<L>  |  0.68    Ca    7.8<L>      05 Dec 2017 09:41  Phos  2.7     12-05  Mg     2.2     12-05    TPro  5.8<L>  /  Alb  1.5<L>  /  TBili  0.4  /  DBili  x   /  AST  49<H>  /  ALT  13  /  AlkPhos  309<H>  12-05    Creatinine Trend: 0.68<--, 0.82<--, 0.62<--                        9.7    20.8  )-----------( 220      ( 05 Dec 2017 09:41 )             30.8     PT/INR - ( 04 Dec 2017 12:25 )   PT: 14.9 sec;   INR: 1.36 ratio         PTT - ( 04 Dec 2017 12:25 )  PTT:28.9 sec    Lipid Panel:   Cardiac Enzymes: CARDIAC MARKERS ( 04 Dec 2017 18:37 )  0.606 ng/mL / x     / 23 U/L / x     / <1.0 ng/mL  CARDIAC MARKERS ( 04 Dec 2017 12:24 )  0.648 ng/mL / x     / x     / x     / x        RADIOLOGY: < from: Xray Chest 1 View AP/PA (12.04.17 @ 13:02) >  Impression: No evidence for pulmonary consolidation, pleural effusion or   pneumothorax.    The trachea is midline.    The cardiac silhouette is within normal limits.    Stable bilateral breast implants.      < end of copied text >    < from: CT Chest No Cont (12.04.17 @ 14:48) >  IMPRESSION: Mild interstitial pulmonary edema and trace bilateral pleural   effusions.    < end of copied text >  < from: CT Head No Cont (12.04.17 @ 14:42) >  Impression: No acute intracranial hemorrhage.     Small age indeterminate territorial infarct in the right high frontal   lobe. If clinically indicated, brain MR may be pursued for further   evaluation.    Mild chronic small vessel ischemic disease.    < end of copied text >      ECG [my interpretation]: 12/4/17@10:55: Sinus rhythm with low voltage, nonspecific T-wave abnormalities possible anterior infarct.     TELEMETRY: Episodes of sinus tachycardia as well as brief runs of long-RP tachycardia probably A tach vs. AVNRT    ECHO:   < from: Transthoracic Echocardiogram (12.05.17 @ 07:27) >  CONCLUSIONS:  1. Opening of the mitral valve appears obstructed by the  echodensity noted in the LV. Mild mitral regurgitation.  2. Aortic valve not well visualized.  3. Aortic Root: 2.6 cm.  4. Normal left atrium.  5. Normal left ventricular internal dimensions and wall  thicknesses.  6. Normal Left Ventricular Systolic Function,  (EF = 55 to  60%)  Echodensity noted at the base of the posterior wall  of the LV of unclear etiology 2.0 x 1.5 cm.  Echo density  noted attached to the LV septum 1.2 x 0.75 cm unclear  etiology  7. Grade II diastolic dysfunction.  8. Right atrium not well visualized.  9. Right ventricle not well visualized.  10. RA Pressure is 10 mm Hg.  11. RV systolic pressure is 65 mm Hg. Severe pulmonary  hypertension.  12. Normal pericardium with no pericardial effusion.    < end of copied text >

## 2017-12-05 NOTE — CONSULT NOTE ADULT - ASSESSMENT
77 F with HLD presenting with neurologic changes and evidence of small infarct of indeterminate age seen on CT. Patient also with incidental mild elevation of troponins (peak 0.648).    1. Troponin elevation:  -Consider outpatient stress test  -Echocardiogram pending    2. HLD: Please check lipid panel 77 F with HLD presenting with neurologic changes and evidence of small infarct of indeterminate age seen on CT. Patient also with incidental mild elevation of troponins (peak 0.648).    1. Troponin elevation:  -Consider outpatient stress test  -Echocardiogram pending; please follow up official read    2. HLD: Please check lipid panel 77 F with HLD presenting with fatigue and evidence of small infarct of indeterminate age seen on CT. Patient also with incidental mild elevation of troponins (peak 0.648) and abnormality seen on echocardiogram.    1. Dyspnea: There are multiple possible etiologies for this:  -There was a possible ill-defined echodensity noted in the left ventricle, possibly impeding mitral inflow; this could produce dyspnea in the setting of functional MS.   Would recommend AMAN for better evaluation of this possible mass/obstruction. Can schedule this for Thursday, please make sure patient is NPO after midnight on Wednesday.   -Additionally, patient has severe pulmonary HTN with a normally functioning LV, would consider pulmonary service evaluation, particularly given patient's history of asthma.        2. Troponin elevation:  -Patient has no chest pain, this may be non-cardiac in nature in setting of a ?recent CVA  -Would manage medically at this point with atorvastatin. Holding beta blocker given patient's respiratory status.     3. HLD: Please check lipid panel 77 F with HLD presenting with fatigue and evidence of small infarct of indeterminate age seen on CT. Patient also with incidental mild elevation of troponins (peak 0.648) and abnormality seen on echocardiogram.    1. Dyspnea: There are multiple possible etiologies for this:  -There was a possible ill-defined echodensity noted in the left ventricle, possibly impeding mitral inflow; this could produce dyspnea in the setting of functional MS.   Would recommend AMAN for better evaluation of this possible mass/obstruction. Can schedule this for Thursday, please make sure patient is NPO after midnight on Wednesday.   -Additionally, patient has severe pulmonary HTN with a normally functioning LV, would consider pulmonary service evaluation, particularly given patient's history of asthma.        2. Troponin elevation:  -Patient has no chest pain, this may be non-cardiac in nature in setting of a ?recent CVA  -Would manage medically at this point with atorvastatin. Holding beta blocker given patient's respiratory status.     3. SVT: Patient with episodes of long RP tachycardia, likely Atrial tachycardia vs. AVNRT, seen on telemetry.   -She feels palpitations but episodes are brief and self limiting  Would normally consider beta blocker, but holding for now per above.     4.HLD: Please check lipid panel

## 2017-12-05 NOTE — PROGRESS NOTE ADULT - PROBLEM SELECTOR PLAN 3
C.W Statin  Check Lipid Profile in AM Elevated WBC count   Patient was receiving Oral prednisone for 7 days  CALLED PHARMACY PATIENT WAS RECEIVING A COURSE OF PREDNISONE 20MG FOR 2 DAYS THEN 10MG FOR 2 DAYS AND 5 MG FOR 3 DAYS

## 2017-12-05 NOTE — PHYSICAL THERAPY INITIAL EVALUATION ADULT - DIAGNOSIS, PT EVAL
Overall decline in functional mobility due to generalized weakness, decreased balance and endurance.

## 2017-12-05 NOTE — PROGRESS NOTE ADULT - PROBLEM SELECTOR PLAN 1
Stroke vs Infection  CT scan of head: Small age indeterminate territorial infarct in the right high frontal   lobe. If clinically indicated, brain MR may be pursued for further evaluation.  Mild chronic small vessel ischemic disease.  MRI and MRA pending CT scan of head: Small age indeterminate territorial infarct in the right high frontal   lobe. If clinically indicated, brain MR may be pursued for further evaluation.  Mild chronic small vessel ischemic disease.  MRI and MRA pending  PT: home w/ home PT

## 2017-12-05 NOTE — PHYSICAL THERAPY INITIAL EVALUATION ADULT - CRITERIA FOR SKILLED THERAPEUTIC INTERVENTIONS
anticipated equipment needs at discharge/predicted duration of therapy intervention/functional limitations in following categories/risk reduction/prevention/rehab potential/anticipated discharge recommendation/impairments found/therapy frequency

## 2017-12-06 ENCOUNTER — INPATIENT (INPATIENT)
Facility: HOSPITAL | Age: 77
LOS: 7 days | Discharge: HOME | DRG: 302 | End: 2017-12-14
Attending: INTERNAL MEDICINE | Admitting: THORACIC SURGERY (CARDIOTHORACIC VASCULAR SURGERY)
Payer: MEDICARE

## 2017-12-06 ENCOUNTER — TRANSCRIPTION ENCOUNTER (OUTPATIENT)
Age: 77
End: 2017-12-06

## 2017-12-06 VITALS
HEART RATE: 110 BPM | SYSTOLIC BLOOD PRESSURE: 130 MMHG | RESPIRATION RATE: 19 BRPM | HEIGHT: 61 IN | WEIGHT: 139.33 LBS | DIASTOLIC BLOOD PRESSURE: 79 MMHG | TEMPERATURE: 98 F | OXYGEN SATURATION: 100 %

## 2017-12-06 VITALS
DIASTOLIC BLOOD PRESSURE: 86 MMHG | HEART RATE: 118 BPM | SYSTOLIC BLOOD PRESSURE: 111 MMHG | TEMPERATURE: 98 F | RESPIRATION RATE: 18 BRPM | OXYGEN SATURATION: 100 %

## 2017-12-06 DIAGNOSIS — I42.9 CARDIOMYOPATHY, UNSPECIFIED: ICD-10-CM

## 2017-12-06 DIAGNOSIS — I27.20 PULMONARY HYPERTENSION, UNSPECIFIED: ICD-10-CM

## 2017-12-06 LAB
-  AMIKACIN: SIGNIFICANT CHANGE UP
-  AMPICILLIN/SULBACTAM: SIGNIFICANT CHANGE UP
-  AMPICILLIN: SIGNIFICANT CHANGE UP
-  AZTREONAM: SIGNIFICANT CHANGE UP
-  CEFAZOLIN: SIGNIFICANT CHANGE UP
-  CEFEPIME: SIGNIFICANT CHANGE UP
-  CEFOXITIN: SIGNIFICANT CHANGE UP
-  CEFTAZIDIME: SIGNIFICANT CHANGE UP
-  CEFTRIAXONE: SIGNIFICANT CHANGE UP
-  CIPROFLOXACIN: SIGNIFICANT CHANGE UP
-  ERTAPENEM: SIGNIFICANT CHANGE UP
-  GENTAMICIN: SIGNIFICANT CHANGE UP
-  IMIPENEM: SIGNIFICANT CHANGE UP
-  LEVOFLOXACIN: SIGNIFICANT CHANGE UP
-  MEROPENEM: SIGNIFICANT CHANGE UP
-  NITROFURANTOIN: SIGNIFICANT CHANGE UP
-  PIPERACILLIN/TAZOBACTAM: SIGNIFICANT CHANGE UP
-  TOBRAMYCIN: SIGNIFICANT CHANGE UP
-  TRIMETHOPRIM/SULFAMETHOXAZOLE: SIGNIFICANT CHANGE UP
ANION GAP SERPL CALC-SCNC: 9 MMOL/L — SIGNIFICANT CHANGE UP (ref 5–17)
BASOPHILS # BLD AUTO: 0 K/UL — SIGNIFICANT CHANGE UP (ref 0–0.2)
BASOPHILS NFR BLD AUTO: 0.2 % — SIGNIFICANT CHANGE UP (ref 0–2)
BUN SERPL-MCNC: 11 MG/DL — SIGNIFICANT CHANGE UP (ref 7–18)
CALCIUM SERPL-MCNC: 7.9 MG/DL — LOW (ref 8.4–10.5)
CHLORIDE SERPL-SCNC: 109 MMOL/L — HIGH (ref 96–108)
CHOLEST SERPL-MCNC: 127 MG/DL — SIGNIFICANT CHANGE UP (ref 10–199)
CO2 SERPL-SCNC: 21 MMOL/L — LOW (ref 22–31)
CREAT SERPL-MCNC: 0.76 MG/DL — SIGNIFICANT CHANGE UP (ref 0.5–1.3)
CULTURE RESULTS: SIGNIFICANT CHANGE UP
EOSINOPHIL # BLD AUTO: 0 K/UL — SIGNIFICANT CHANGE UP (ref 0–0.5)
EOSINOPHIL NFR BLD AUTO: 0.1 % — SIGNIFICANT CHANGE UP (ref 0–6)
GLUCOSE SERPL-MCNC: 183 MG/DL — HIGH (ref 70–99)
HCT VFR BLD CALC: 32.8 % — LOW (ref 34.5–45)
HDLC SERPL-MCNC: 16 MG/DL — LOW (ref 40–125)
HGB BLD-MCNC: 10.3 G/DL — LOW (ref 11.5–15.5)
LIPID PNL WITH DIRECT LDL SERPL: 76 MG/DL — SIGNIFICANT CHANGE UP
LYMPHOCYTES # BLD AUTO: 1.2 K/UL — SIGNIFICANT CHANGE UP (ref 1–3.3)
LYMPHOCYTES # BLD AUTO: 6.6 % — LOW (ref 13–44)
MCHC RBC-ENTMCNC: 28.4 PG — SIGNIFICANT CHANGE UP (ref 27–34)
MCHC RBC-ENTMCNC: 31.4 GM/DL — LOW (ref 32–36)
MCV RBC AUTO: 90.6 FL — SIGNIFICANT CHANGE UP (ref 80–100)
METHOD TYPE: SIGNIFICANT CHANGE UP
MONOCYTES # BLD AUTO: 0.3 K/UL — SIGNIFICANT CHANGE UP (ref 0–0.9)
MONOCYTES NFR BLD AUTO: 1.5 % — LOW (ref 2–14)
NEUTROPHILS # BLD AUTO: 17.1 K/UL — HIGH (ref 1.8–7.4)
NEUTROPHILS NFR BLD AUTO: 91.7 % — HIGH (ref 43–77)
ORGANISM # SPEC MICROSCOPIC CNT: SIGNIFICANT CHANGE UP
ORGANISM # SPEC MICROSCOPIC CNT: SIGNIFICANT CHANGE UP
PLATELET # BLD AUTO: 328 K/UL — SIGNIFICANT CHANGE UP (ref 150–400)
POTASSIUM SERPL-MCNC: 4.5 MMOL/L — SIGNIFICANT CHANGE UP (ref 3.5–5.3)
POTASSIUM SERPL-SCNC: 4.5 MMOL/L — SIGNIFICANT CHANGE UP (ref 3.5–5.3)
RBC # BLD: 3.62 M/UL — LOW (ref 3.8–5.2)
RBC # FLD: 15.2 % — HIGH (ref 10.3–14.5)
SODIUM SERPL-SCNC: 139 MMOL/L — SIGNIFICANT CHANGE UP (ref 135–145)
SPECIMEN SOURCE: SIGNIFICANT CHANGE UP
TOTAL CHOLESTEROL/HDL RATIO MEASUREMENT: 7.9 RATIO — HIGH (ref 3.3–7.1)
TRANSFERRIN SERPL-MCNC: 127 MG/DL — LOW (ref 200–360)
TRIGL SERPL-MCNC: 175 MG/DL — HIGH (ref 10–149)
TSH SERPL-MCNC: 2.35 UU/ML — SIGNIFICANT CHANGE UP (ref 0.34–4.82)
WBC # BLD: 18.6 K/UL — HIGH (ref 3.8–10.5)
WBC # FLD AUTO: 18.6 K/UL — HIGH (ref 3.8–10.5)

## 2017-12-06 PROCEDURE — 99239 HOSP IP/OBS DSCHRG MGMT >30: CPT

## 2017-12-06 PROCEDURE — 99222 1ST HOSP IP/OBS MODERATE 55: CPT

## 2017-12-06 PROCEDURE — 70551 MRI BRAIN STEM W/O DYE: CPT | Mod: 26

## 2017-12-06 RX ORDER — ERTAPENEM SODIUM 1 G/1
INJECTION, POWDER, LYOPHILIZED, FOR SOLUTION INTRAMUSCULAR; INTRAVENOUS
Qty: 0 | Refills: 0 | Status: DISCONTINUED | OUTPATIENT
Start: 2017-12-06 | End: 2017-12-06

## 2017-12-06 RX ORDER — ACETAMINOPHEN 500 MG
650 TABLET ORAL EVERY 6 HOURS
Qty: 0 | Refills: 0 | Status: DISCONTINUED | OUTPATIENT
Start: 2017-12-06 | End: 2017-12-14

## 2017-12-06 RX ORDER — ASPIRIN/CALCIUM CARB/MAGNESIUM 324 MG
162 TABLET ORAL ONCE
Qty: 0 | Refills: 0 | Status: DISCONTINUED | OUTPATIENT
Start: 2017-12-06 | End: 2017-12-06

## 2017-12-06 RX ORDER — HEPARIN SODIUM 5000 [USP'U]/ML
5000 INJECTION INTRAVENOUS; SUBCUTANEOUS
Qty: 0 | Refills: 0 | COMMUNITY
Start: 2017-12-06

## 2017-12-06 RX ORDER — ASPIRIN/CALCIUM CARB/MAGNESIUM 324 MG
1 TABLET ORAL
Qty: 0 | Refills: 0 | COMMUNITY
Start: 2017-12-06

## 2017-12-06 RX ORDER — ASPIRIN/CALCIUM CARB/MAGNESIUM 324 MG
325 TABLET ORAL DAILY
Qty: 0 | Refills: 0 | Status: DISCONTINUED | OUTPATIENT
Start: 2017-12-07 | End: 2017-12-06

## 2017-12-06 RX ORDER — ALBUTEROL 90 UG/1
2 AEROSOL, METERED ORAL EVERY 6 HOURS
Qty: 0 | Refills: 0 | Status: DISCONTINUED | OUTPATIENT
Start: 2017-12-06 | End: 2017-12-14

## 2017-12-06 RX ORDER — CEFTRIAXONE 500 MG/1
1 INJECTION, POWDER, FOR SOLUTION INTRAMUSCULAR; INTRAVENOUS EVERY 24 HOURS
Qty: 0 | Refills: 0 | Status: DISCONTINUED | OUTPATIENT
Start: 2017-12-06 | End: 2017-12-07

## 2017-12-06 RX ORDER — ERTAPENEM SODIUM 1 G/1
1000 INJECTION, POWDER, LYOPHILIZED, FOR SOLUTION INTRAMUSCULAR; INTRAVENOUS ONCE
Qty: 0 | Refills: 0 | Status: DISCONTINUED | OUTPATIENT
Start: 2017-12-06 | End: 2017-12-06

## 2017-12-06 RX ORDER — HEPARIN SODIUM 5000 [USP'U]/ML
5000 INJECTION INTRAVENOUS; SUBCUTANEOUS EVERY 8 HOURS
Qty: 0 | Refills: 0 | Status: DISCONTINUED | OUTPATIENT
Start: 2017-12-06 | End: 2017-12-08

## 2017-12-06 RX ORDER — ATORVASTATIN CALCIUM 80 MG/1
40 TABLET, FILM COATED ORAL AT BEDTIME
Qty: 0 | Refills: 0 | Status: DISCONTINUED | OUTPATIENT
Start: 2017-12-06 | End: 2017-12-14

## 2017-12-06 RX ORDER — CEFTRIAXONE 500 MG/1
1 INJECTION, POWDER, FOR SOLUTION INTRAMUSCULAR; INTRAVENOUS ONCE
Qty: 0 | Refills: 0 | Status: COMPLETED | OUTPATIENT
Start: 2017-12-06 | End: 2017-12-06

## 2017-12-06 RX ORDER — CEFTRIAXONE 500 MG/1
INJECTION, POWDER, FOR SOLUTION INTRAMUSCULAR; INTRAVENOUS
Qty: 0 | Refills: 0 | Status: DISCONTINUED | OUTPATIENT
Start: 2017-12-06 | End: 2017-12-06

## 2017-12-06 RX ORDER — ASPIRIN/CALCIUM CARB/MAGNESIUM 324 MG
162 TABLET ORAL ONCE
Qty: 0 | Refills: 0 | Status: COMPLETED | OUTPATIENT
Start: 2017-12-06 | End: 2017-12-06

## 2017-12-06 RX ORDER — CEFTRIAXONE 500 MG/1
1000 INJECTION, POWDER, FOR SOLUTION INTRAMUSCULAR; INTRAVENOUS
Qty: 0 | Refills: 0 | COMMUNITY
Start: 2017-12-06

## 2017-12-06 RX ORDER — PANTOPRAZOLE SODIUM 20 MG/1
40 TABLET, DELAYED RELEASE ORAL
Qty: 0 | Refills: 0 | Status: DISCONTINUED | OUTPATIENT
Start: 2017-12-06 | End: 2017-12-14

## 2017-12-06 RX ORDER — HYDROCORTISONE 20 MG
40 TABLET ORAL
Qty: 0 | Refills: 0 | COMMUNITY
Start: 2017-12-06

## 2017-12-06 RX ORDER — ATORVASTATIN CALCIUM 80 MG/1
80 TABLET, FILM COATED ORAL AT BEDTIME
Qty: 0 | Refills: 0 | Status: DISCONTINUED | OUTPATIENT
Start: 2017-12-06 | End: 2017-12-06

## 2017-12-06 RX ORDER — LEVOTHYROXINE SODIUM 125 MCG
50 TABLET ORAL DAILY
Qty: 0 | Refills: 0 | Status: DISCONTINUED | OUTPATIENT
Start: 2017-12-06 | End: 2017-12-14

## 2017-12-06 RX ORDER — LORATADINE 10 MG/1
10 TABLET ORAL DAILY
Qty: 0 | Refills: 0 | Status: DISCONTINUED | OUTPATIENT
Start: 2017-12-06 | End: 2017-12-14

## 2017-12-06 RX ORDER — ERTAPENEM SODIUM 1 G/1
1000 INJECTION, POWDER, LYOPHILIZED, FOR SOLUTION INTRAMUSCULAR; INTRAVENOUS EVERY 24 HOURS
Qty: 0 | Refills: 0 | Status: DISCONTINUED | OUTPATIENT
Start: 2017-12-07 | End: 2017-12-06

## 2017-12-06 RX ORDER — ERTAPENEM SODIUM 1 G/1
1000 INJECTION, POWDER, LYOPHILIZED, FOR SOLUTION INTRAMUSCULAR; INTRAVENOUS
Qty: 0 | Refills: 0 | Status: DISCONTINUED | OUTPATIENT
Start: 2017-12-06 | End: 2017-12-06

## 2017-12-06 RX ORDER — ASPIRIN/CALCIUM CARB/MAGNESIUM 324 MG
325 TABLET ORAL DAILY
Qty: 0 | Refills: 0 | Status: DISCONTINUED | OUTPATIENT
Start: 2017-12-06 | End: 2017-12-14

## 2017-12-06 RX ADMIN — Medication 3 MILLILITER(S): at 14:16

## 2017-12-06 RX ADMIN — Medication 162 MILLIGRAM(S): at 18:33

## 2017-12-06 RX ADMIN — Medication 3 MILLILITER(S): at 09:19

## 2017-12-06 RX ADMIN — HEPARIN SODIUM 5000 UNIT(S): 5000 INJECTION INTRAVENOUS; SUBCUTANEOUS at 05:58

## 2017-12-06 RX ADMIN — Medication 250 MILLIGRAM(S): at 01:15

## 2017-12-06 RX ADMIN — PIPERACILLIN AND TAZOBACTAM 12.5 GRAM(S): 4; .5 INJECTION, POWDER, LYOPHILIZED, FOR SOLUTION INTRAVENOUS at 05:58

## 2017-12-06 RX ADMIN — Medication 50 MILLIGRAM(S): at 05:58

## 2017-12-06 RX ADMIN — Medication 50 MILLIGRAM(S): at 21:51

## 2017-12-06 RX ADMIN — CEFTRIAXONE 100 GRAM(S): 500 INJECTION, POWDER, FOR SOLUTION INTRAMUSCULAR; INTRAVENOUS at 19:04

## 2017-12-06 RX ADMIN — Medication 3 MILLILITER(S): at 20:32

## 2017-12-06 RX ADMIN — Medication 81 MILLIGRAM(S): at 12:39

## 2017-12-06 RX ADMIN — Medication 50 MICROGRAM(S): at 05:58

## 2017-12-06 RX ADMIN — HEPARIN SODIUM 5000 UNIT(S): 5000 INJECTION INTRAVENOUS; SUBCUTANEOUS at 21:51

## 2017-12-06 RX ADMIN — ATORVASTATIN CALCIUM 80 MILLIGRAM(S): 80 TABLET, FILM COATED ORAL at 21:51

## 2017-12-06 NOTE — DISCHARGE NOTE ADULT - CARE PLAN
Principal Discharge DX:	Cardiac mass  Goal:	Continue with care at [facility]  Instructions for follow-up, activity and diet:	You were admitted with concern for stroke and found to have bilateral small emboli throughout your brain. Your echocardiogram revealed evidence of a cardiac mass so we decided to transfer you to [facility], where you will undergo transthoracic echocardiogram testing and definitive surgical management as needed.

## 2017-12-06 NOTE — CONSULT NOTE ADULT - ASSESSMENT
Impression: 76 y/o female with incidental finding of isolated elevated pulmonary artery pressure. She is completely asymptomatic. No functional limitations. CT chest reviewed with a mosaic attenuation which can usually suggest small airway disease vs pulmonary HTN. No evidence of ILD. RA not typically associated with pulmonary HTN without ILD. No other features on the echocardiogram of right sided heart failure and she does have possible diastolic dysfunction. No evidence in her history to suggest chronic thromboembolic disease. She is not hypoxic.    Recommend  Given lack of symptoms, evidence of RV dysfunction,  and isolated reading, no current inpatient workup recommended  Will follow AMAN report for better view for heart  The patient can have a follow up echocardiogram as an outpatient depending on AMAN results with follow up with her PCP and pulmonologist Dr Maria for further comparisons with other testing including her PFTs and DLCO if done.  The finding is unlikely related to her current presentation of generalized weakness

## 2017-12-06 NOTE — CONSULT NOTE ADULT - ASSESSMENT
fever  leukocytosis - improving  no signs of active infection and so fever could be from rheumatoid arthritis    plan dc vanco and zosyn  start rocephin 1 gm iv q24hrs  await cult and AMAN results

## 2017-12-06 NOTE — DIETITIAN INITIAL EVALUATION ADULT. - NUTRITION INTERVENTION
Feeding Assistance/Medical Food Supplements/Collaboration and Referral of Nutrition Care/Meals and Snack

## 2017-12-06 NOTE — CHART NOTE - NSCHARTNOTEFT_GEN_A_CORE
We will hold anticoagulation at this point, giving the suspicion that the mass in the heart is possibly a vegetation  vs a myxoma, especially in light of severely high WBC count on admission at 26K, which raises suspicion of possibility of  septic embolization.

## 2017-12-06 NOTE — H&P ADULT - ASSESSMENT
78 yo F with rheumatoid arthritis, asthma, pulmomary HTN, HLD recently treated for suspected musculoskeletal strain and urinary tract infection, who presented to [facility] with weakness and dyspnea.   Found to have an echodensity in LV on echo and numerous small bilateral infarcts suspicious for embolic phenomenon.  Transferred to [facility] for AMAN and rule out endocarditis.

## 2017-12-06 NOTE — DISCHARGE NOTE ADULT - MEDICATION SUMMARY - MEDICATIONS TO TAKE
I will START or STAY ON the medications listed below when I get home from the hospital:    hydrocortisone  -- 40 milligram(s) intravenous every 8 hours  -- Indication: For Weakness    aspirin 325 mg oral tablet  -- 1 tab(s) by mouth once a day  -- Indication: For Stroke    Fioricet  --  by mouth   -- Indication: For Headache    heparin  -- 5000 milligram(s) subcutaneous 2 times a day  -- Indication: For Prophylactic measure    ZyrTEC 10 mg oral tablet, chewable  -- 1 tab(s) by mouth once a day  -- It is very important that you take or use this exactly as directed.  Do not skip doses or discontinue unless directed by your doctor.  May cause drowsiness.  Alcohol may intensify this effect.  Use care when operating dangerous machinery.  Obtain medical advice before taking any non-prescription drugs as some may affect the action of this medication.    -- Indication: For Allergies    Lipitor 40 mg oral tablet  -- 1 tab(s) by mouth once a day  -- Indication: For HLD (hyperlipidemia)    Proventil HFA 90 mcg/inh inhalation aerosol  -- 2 puff(s) inhaled 4 times a day  -- For inhalation only.  It is very important that you take or use this exactly as directed.  Do not skip doses or discontinue unless directed by your doctor.  Obtain medical advice before taking any non-prescription drugs as some may affect the action of this medication.  Shake well before use.    -- Indication: For Asthma    cefTRIAXone  -- 1000 milligram(s) intravenous once a day  -- Indication: For UTI    levothyroxine 50 mcg (0.05 mg) oral tablet  -- 1 tab(s) by mouth once a day  -- Indication: For Hypothyroid

## 2017-12-06 NOTE — H&P ADULT - NSHPPHYSICALEXAM_GEN_ALL_CORE
General: Well nourished, well developed, no acute distress.                                                         Neuro: Normal exam oriented to person/place & time with no focal motor or sensory  deficits.                    Eyes: Normal exam of conjunctiva & lids, pupils equally reactive.   ENT: Normal exam of nasal/oral mucosa with absence of cyanosis.   Neck: Normal exam of jugular veins, trachea & thyroid.   Chest: Normal lung exam with good air movement absence of wheezes, rales, or rhonchi:                                                                          CV:  Auscultation: normal [ ] S3[ ] S4[ ] Irregular [ ] Rub[ ] Clicks[ ]    Murmurs none:[]systolic [ ]  diastolic [ ] holosystolic [ ]  Carotids: No Bruits[ ] Other:                  Abdominal Aorta: normal [ ] nonpalpable[]                                                                         GI: Soft, non-tender, non-distended, liver & spleen with no noted masses or tenderness. Bowel sounds active in all 4 quadrants                                                                                             Extremities: Normal no evidence of cyanosis or deformity Edema: none[ ]trace[ ]1+[ ]2+[ ]3+[ ]4+[ ]  Lower Extremity Pulses: Right[   /4] Left[  /4 ] Varicosities[  ]  SKIN :  Normal exam to inspection & palation.  No rashes, breakdown General: No acute distress                                                     Neuro: Non focal, becomes frustrated when trying to remember name of PCP                    Eyes: PERRLA EOMI  ENT: dry oral mucosa   Neck: No JVD  Chest: Tachynpneic RR 22, clear lungs                                                                        CV:  Auscultation: normal [x ] S3[ ] S4[ ] Irregular [ ] Rub[ ] Clicks[ ]    Murmurs none:[]systolic [ ]  diastolic [ ] holosystolic [ ]  Carotids: No Bruits[x ] Other:                  Abdominal Aorta: normal [ ] nonpalpable[]                                                                         GI: Soft, non-tender, non-distended, liver & spleen with no noted masses or tenderness. Bowel sounds active in all 4 quadrants                                                                                             Extremities: Normal no evidence of cyanosis or deformity Edema: none[ ]trace[x ]1+[ ]2+[ ]3+[ ]4+[ ]  Lower Extremity Pulses: Right[ 2  /4] Left[  2/4 ] Varicosities[  ]  SKIN :  warm, dry, intact

## 2017-12-06 NOTE — DISCHARGE NOTE ADULT - NS AS DC STROKE ED MATERIALS
Prescribed Medications/Need for Followup After Discharge/Stroke Warning Signs and Symptoms/Call 911 for Stroke/Risk Factors for Stroke/Stroke Education Booklet

## 2017-12-06 NOTE — CONSULT NOTE ADULT - SUBJECTIVE AND OBJECTIVE BOX
77 F with PMH of Asthma, HLD, RA,  migraines and UTI recently treated with PO antibiotics presented to ED with generalized fatigue and malaise from last 3 days. Patient states that she recently had a ED visit and found to have UTI, was sent home on PO antibiotics. Patient had visit PCP for R shoulder pain and was started on PO prednisone last week. Pt was found to have leukocytosis and was started on abx. Pt complaints of SOB and denies fever, nausea, vomiting, diarrhea,cough, rash, constipation, abdominal pain, chest pain, headache.    SH: Non smoker, non alcoholic, denies illicit drug use.   Fh: HTN in Mother.       REVIEW OF SYSTEMS:  [  ] Not able to illicit  General: NAD  Chest: no chest pain, SOB  GI: no abdominal pain no N/V  : no dysuria, no urinary frequency, no hesitancy   Skin: no rash   Musculoskeletal:	 decrease ROM  Neuro: AAOx 3     PAST MEDICAL & SURGICAL HISTORY:  Rheumatoid arthritis  Migraines  HLD (hyperlipidemia)  Hypothyroid  No significant past surgical history    ALLERGIES: No Known Allergies    MEDS:  ALBUTerol/ipratropium for Nebulization 3 milliLiter(s) Nebulizer every 6 hours  aspirin  chewable 81 milliGRAM(s) Oral daily  atorvastatin 40 milliGRAM(s) Oral at bedtime  heparin  Injectable 5000 Unit(s) SubCutaneous every 8 hours  hydrocortisone sodium succinate Injectable 50 milliGRAM(s) IV Push every 8 hours  levothyroxine 50 MICROGram(s) Oral daily  piperacillin/tazobactam IVPB. 3.375 Gram(s) IV Intermittent every 8 hours  vancomycin  IVPB 1000 milliGRAM(s) IV Intermittent every 24 hours    SOCIAL HISTORY:  Smoker:      FAMILY HISTORY:  Family history of hypertension (Mother)    VITALS:  Vital Signs Last 24 Hrs  T(C): 36.2 (06 Dec 2017 08:00), Max: 36.8 (05 Dec 2017 11:33)  T(F): 97.1 (06 Dec 2017 08:00), Max: 98.2 (05 Dec 2017 11:33)  HR: 88 (06 Dec 2017 08:00) (88 - 117)  BP: 126/77 (06 Dec 2017 08:00) (98/47 - 128/86)  BP(mean): --  RR: 18 (06 Dec 2017 08:00) (15 - 18)  SpO2: 100% (06 Dec 2017 08:00) (98% - 100%)      PHYSICAL EXAM:  Constitutional: NAD  HEENT: dry mucosa  Neck: no JVD, supple  Respiratory: bilateral rales at upper lobes, no wheezing, no rhonchi  Cardiovascular: tachycardia, no RMG  Gastrointestinal: soft, non tender, no distention, no guarding, + BS  Extremities: no edema, no cyanosis   Skin: no rashes, no tenderness or erythema on IV line site  Ortho: no acute pathology   Neuro: AAOx3, no focal findings       LABS/DIAGNOSTIC TESTS:                        10.3   18.6  )-----------( 328      ( 06 Dec 2017 07:12 )             32.8     WBC Count: 18.6 K/uL ( @ 07:12)  WBC Count: 23.9 K/uL ( @ 13:55)  WBC Count: 20.8 K/uL ( @ 09:41)  WBC Count: 26.6 K/uL ( @ 12:25)    12    139  |  109<H>  |  11  ----------------------------<  183<H>  4.5   |  21<L>  |  0.76    Ca    7.9<L>      06 Dec 2017 07:12  Phos  2.7       Mg     2.2         TPro  5.8<L>  /  Alb  1.5<L>  /  TBili  0.4  /  DBili  x   /  AST  49<H>  /  ALT  13  /  AlkPhos  309<H>  12    Urinalysis Basic - ( 04 Dec 2017 16:16 )    Color: Yellow / Appearance: Clear / S.015 / pH: x  Gluc: x / Ketone: Negative  / Bili: Negative / Urobili: Negative   Blood: x / Protein: 30 mg/dL / Nitrite: Negative   Leuk Esterase: Trace / RBC: 0-2 /HPF / WBC 3-5 /HPF   Sq Epi: x / Non Sq Epi: Few /HPF / Bacteria: Few /HPF      LIVER FUNCTIONS - ( 05 Dec 2017 09:41 )  Alb: 1.5 g/dL / Pro: 5.8 g/dL / ALK PHOS: 309 U/L / ALT: 13 U/L DA / AST: 49 U/L / GGT: x           PT/INR - ( 04 Dec 2017 12:25 )   PT: 14.9 sec;   INR: 1.36 ratio         PTT - ( 04 Dec 2017 12:25 )  PTT:28.9 sec    ABG -     CULTURES:   .Urine Clean Catch (Midstream)   @ 23:14   10,000 - 49,000 CFU/mL Escherichia coli  --  --      .Blood Blood   @ 18:28   No growth to date.  --  --      .Blood Blood   @ 18:27   No growth to date.  --  --      .Blood Blood-Peripheral   @ 00:47   No growth at 5 days.  --  --      .Urine Clean Catch (Midstream)   @ 00:19   <10,000 CFU/ml  Normal Urogenital [place] present  --  --          RADIOLOGY: 77 F with PMH of Asthma, HLD, RA,  migraines and UTI recently treated with PO antibiotics presented to ED with generalized fatigue and malaise from last 3 days. Patient states that she recently had a ED visit and found to have UTI, was sent home on PO antibiotics. Patient had visit PCP for R shoulder pain and was started on PO prednisone last week. Pt was found to have leukocytosis and was started on abx. Pt complaints of SOB and denies fever, nausea, vomiting, diarrhea,cough, rash, constipation, abdominal pain, chest pain, headache.    SH: Non smoker, non alcoholic, denies illicit drug use.   Fh: HTN in Mother.       REVIEW OF SYSTEMS:  [  ] Not able to illicit  General: NAD  Chest: no chest pain, SOB  GI: no abdominal pain no N/V  : no dysuria, no urinary frequency, no hesitancy   Skin: no rash   Musculoskeletal:	 decrease ROM  Neuro: AAOx 3     PAST MEDICAL & SURGICAL HISTORY:  Rheumatoid arthritis  Migraines  HLD (hyperlipidemia)  Hypothyroid  No significant past surgical history    ALLERGIES: No Known Allergies    MEDS:  ALBUTerol/ipratropium for Nebulization 3 milliLiter(s) Nebulizer every 6 hours  aspirin  chewable 81 milliGRAM(s) Oral daily  atorvastatin 40 milliGRAM(s) Oral at bedtime  heparin  Injectable 5000 Unit(s) SubCutaneous every 8 hours  hydrocortisone sodium succinate Injectable 50 milliGRAM(s) IV Push every 8 hours  levothyroxine 50 MICROGram(s) Oral daily  piperacillin/tazobactam IVPB. 3.375 Gram(s) IV Intermittent every 8 hours  vancomycin  IVPB 1000 milliGRAM(s) IV Intermittent every 24 hours    SOCIAL HISTORY:  Smoker:      FAMILY HISTORY:  Family history of hypertension (Mother)    VITALS:  Vital Signs Last 24 Hrs  T(C): 36.2 (06 Dec 2017 08:00), Max: 36.8 (05 Dec 2017 11:33)  T(F): 97.1 (06 Dec 2017 08:00), Max: 98.2 (05 Dec 2017 11:33)  HR: 88 (06 Dec 2017 08:00) (88 - 117)  BP: 126/77 (06 Dec 2017 08:00) (98/47 - 128/86)  BP(mean): --  RR: 18 (06 Dec 2017 08:00) (15 - 18)  SpO2: 100% (06 Dec 2017 08:00) (98% - 100%)      PHYSICAL EXAM:  Constitutional: NAD  HEENT: dry mucosa  Neck: no JVD, supple  Respiratory: bilateral rales at upper lobes, no wheezing, no rhonchi  Cardiovascular: tachycardia, no RMG  Gastrointestinal: soft, non tender, no distention, no guarding, + BS  Extremities: no edema, no cyanosis   Skin: no rashes, no tenderness or erythema on IV line site  Ortho: no acute pathology   Neuro: AAOx3, no focal findings       LABS/DIAGNOSTIC TESTS:                        10.3   18.6  )-----------( 328      ( 06 Dec 2017 07:12 )             32.8     WBC Count: 18.6 K/uL ( @ 07:12)  WBC Count: 23.9 K/uL ( @ 13:55)  WBC Count: 20.8 K/uL ( @ 09:41)  WBC Count: 26.6 K/uL ( @ 12:25)    12    139  |  109<H>  |  11  ----------------------------<  183<H>  4.5   |  21<L>  |  0.76    Ca    7.9<L>      06 Dec 2017 07:12  Phos  2.7       Mg     2.2         TPro  5.8<L>  /  Alb  1.5<L>  /  TBili  0.4  /  DBili  x   /  AST  49<H>  /  ALT  13  /  AlkPhos  309<H>  12    Urinalysis Basic - ( 04 Dec 2017 16:16 )    Color: Yellow / Appearance: Clear / S.015 / pH: x  Gluc: x / Ketone: Negative  / Bili: Negative / Urobili: Negative   Blood: x / Protein: 30 mg/dL / Nitrite: Negative   Leuk Esterase: Trace / RBC: 0-2 /HPF / WBC 3-5 /HPF   Sq Epi: x / Non Sq Epi: Few /HPF / Bacteria: Few /HPF      LIVER FUNCTIONS - ( 05 Dec 2017 09:41 )  Alb: 1.5 g/dL / Pro: 5.8 g/dL / ALK PHOS: 309 U/L / ALT: 13 U/L DA / AST: 49 U/L / GGT: x           PT/INR - ( 04 Dec 2017 12:25 )   PT: 14.9 sec;   INR: 1.36 ratio         PTT - ( 04 Dec 2017 12:25 )  PTT:28.9 sec    ABG -     CULTURES:   .Urine Clean Catch (Midstream)   @ 23:14   10,000 - 49,000 CFU/mL Escherichia coli  --  --      .Blood Blood   @ 18:28   No growth to date.  --  --      .Blood Blood   @ 18:27   No growth to date.  --  --      .Blood Blood-Peripheral   @ 00:47   No growth at 5 days.  --  --      .Urine Clean Catch (Midstream)   @ 00:19   <10,000 CFU/ml  Normal Urogenital [place] present  --  --          RADIOLOGY:    < from: CT Chest No Cont (17 @ 14:48) >  EXAM:  CT CHEST                            PROCEDURE DATE:  2017          INTERPRETATION:  CLINICAL INFORMATION: Elevated white count.    COMPARISON: Chest radiograph 2017.    PROCEDURE:   CT of the Chest was performed without intravenous contrast.  Sagittal and coronal reformats were performed.      FINDINGS:    CHEST:     LUNGS AND LARGE AIRWAYS: Patent central airways.  Mild bilateral   interlobular septal thickening and groundglass opacity, consistent with   interstitial pulmonary edema. No pulmonary consolidation.  PLEURA: Trace bilateral pleural effusions.  VESSELS: Within normal limits.  HEART: Heart size is normal. No pericardial effusion.  MEDIASTINUM AND BIRD: No lymphadenopathy.  CHEST WALL AND LOWER NECK: Bilateral peripherally calcified breast   implants.  VISUALIZED UPPER ABDOMEN: Within normal limits.  BONES: Mild degenerative changes.    IMPRESSION: Mild interstitial pulmonary edema and trace bilateral pleural   effusions.                    NICOLLE BAILEY M.D., ATTENDING RADIOLOGIST  This document has been electronically signed. Dec  4 2017  4:06PM                < end of copied text >      < from: CT Head No Cont (17 @ 14:42) >  EXAM:  CT BRAIN                            PROCEDURE DATE:  2017          INTERPRETATION:  Head CT without IV contrast     Clinical Indication: weakness    Technique: Axial CT images of the head are obtained from the skull base   to the vertex without IV contrast.    Comparison: None.    Findings: There is no acute intracranial hemorrhage. Small age   indeterminate territorial infarct in the right high frontal lobe   involving the cortex. Mild patchy hypodensities in the periventricular   and subcortical white matter bilaterally, compatible with chronic small   vessel ischemic disease. There is no space-occupying lesion, midline   shift, or herniation. The ventricles maintain normal size, shape, and   location. The sulci are symmetricand normal for age bilaterally. No   extra-axial fluid collection.  The visualized paranasal sinuses and   orbits appear grossly unremarkable.    Impression: No acute intracranial hemorrhage.     Small age indeterminate territorial infarct in the right high frontal   lobe. If clinically indicated, brain MR may be pursued for further   evaluation.    Mild chronic small vessel ischemic disease.    Dr. Eubanks is informed with read back.          < end of copied text >    < from: Xray Chest 1 View AP/PA (17 @ 13:02) >    EXAM:  CHEST SINGLE AP OR PA                            PROCEDURE DATE:  2017          INTERPRETATION:  Portable chest x-ray    Indication: Cough.    Portable chest x-ray is compared to a previous examination dated   2017.    Impression: No evidence for pulmonary consolidation, pleural effusion or   pneumothorax.    The trachea is midline.    The cardiac silhouette is within normal limits.    Stable bilateral breast implants.                JAYSON MIRAMONTES M.D., ATTENDING RADIOLOGIST  This document has been electronically signed. Dec  4 2017  1:50PM                < end of copied text > 77 F with PMH of Asthma, HLD, RA,  migraines and UTI recently treated with PO antibiotics presented to ED with generalized fatigue and malaise from last 3 days. Patient states that she recently had a ED visit and found to have UTI, was sent home on PO antibiotics. Patient had visit PCP for R shoulder pain and was started on PO prednisone last week. Pt was found to have leukocytosis and was started on abx. Pt complaints of SOB and denies fever, nausea, vomiting, diarrhea,cough, rash, constipation, abdominal pain, chest pain, headache.    SH: Non smoker, non alcoholic, denies illicit drug use.   Fh: HTN in Mother.       REVIEW OF SYSTEMS:  [  ] Not able to illicit  General: NAD  Chest: no chest pain, SOB  GI: no abdominal pain no N/V  : no dysuria, no urinary frequency, no hesitancy   Skin: no rash   Musculoskeletal:	 decrease ROM, swan neck deformity bilateral   Neuro: AAOx 3     PAST MEDICAL & SURGICAL HISTORY:  Rheumatoid arthritis  Migraines  HLD (hyperlipidemia)  Hypothyroid  No significant past surgical history    ALLERGIES: No Known Allergies    MEDS:  ALBUTerol/ipratropium for Nebulization 3 milliLiter(s) Nebulizer every 6 hours  aspirin  chewable 81 milliGRAM(s) Oral daily  atorvastatin 40 milliGRAM(s) Oral at bedtime  heparin  Injectable 5000 Unit(s) SubCutaneous every 8 hours  hydrocortisone sodium succinate Injectable 50 milliGRAM(s) IV Push every 8 hours  levothyroxine 50 MICROGram(s) Oral daily  piperacillin/tazobactam IVPB. 3.375 Gram(s) IV Intermittent every 8 hours  vancomycin  IVPB 1000 milliGRAM(s) IV Intermittent every 24 hours    SOCIAL HISTORY:  Smoker:      FAMILY HISTORY:  Family history of hypertension (Mother)    VITALS:  Vital Signs Last 24 Hrs  T(C): 36.2 (06 Dec 2017 08:00), Max: 36.8 (05 Dec 2017 11:33)  T(F): 97.1 (06 Dec 2017 08:00), Max: 98.2 (05 Dec 2017 11:33)  HR: 88 (06 Dec 2017 08:00) (88 - 117)  BP: 126/77 (06 Dec 2017 08:00) (98/47 - 128/86)  BP(mean): --  RR: 18 (06 Dec 2017 08:00) (15 - 18)  SpO2: 100% (06 Dec 2017 08:00) (98% - 100%)      PHYSICAL EXAM:  Constitutional: NAD  HEENT: dry mucosa  Neck: no JVD, supple  Respiratory: bilateral rales at upper lobes, no wheezing, no rhonchi  Cardiovascular: tachycardia, no RMG  Gastrointestinal: soft, non tender, no distention, no guarding, + BS  Extremities: no edema, no cyanosis   Skin: no rashes, no tenderness or erythema on IV line site  Ortho: no acute pathology   Neuro: AAOx3, no focal findings       LABS/DIAGNOSTIC TESTS:                        10.3   18.6  )-----------( 328      ( 06 Dec 2017 07:12 )             32.8     WBC Count: 18.6 K/uL ( @ 07:12)  WBC Count: 23.9 K/uL ( @ 13:55)  WBC Count: 20.8 K/uL ( @ 09:41)  WBC Count: 26.6 K/uL ( @ 12:25)    12    139  |  109<H>  |  11  ----------------------------<  183<H>  4.5   |  21<L>  |  0.76    Ca    7.9<L>      06 Dec 2017 07:12  Phos  2.7       Mg     2.2         TPro  5.8<L>  /  Alb  1.5<L>  /  TBili  0.4  /  DBili  x   /  AST  49<H>  /  ALT  13  /  AlkPhos  309<H>      Urinalysis Basic - ( 04 Dec 2017 16:16 )    Color: Yellow / Appearance: Clear / S.015 / pH: x  Gluc: x / Ketone: Negative  / Bili: Negative / Urobili: Negative   Blood: x / Protein: 30 mg/dL / Nitrite: Negative   Leuk Esterase: Trace / RBC: 0-2 /HPF / WBC 3-5 /HPF   Sq Epi: x / Non Sq Epi: Few /HPF / Bacteria: Few /HPF      LIVER FUNCTIONS - ( 05 Dec 2017 09:41 )  Alb: 1.5 g/dL / Pro: 5.8 g/dL / ALK PHOS: 309 U/L / ALT: 13 U/L DA / AST: 49 U/L / GGT: x           PT/INR - ( 04 Dec 2017 12:25 )   PT: 14.9 sec;   INR: 1.36 ratio         PTT - ( 04 Dec 2017 12:25 )  PTT:28.9 sec    ABG -     CULTURES:   .Urine Clean Catch (Midstream)   @ 23:14   10,000 - 49,000 CFU/mL Escherichia coli  --  --      .Blood Blood   @ 18:28   No growth to date.  --  --      .Blood Blood   @ 18:27   No growth to date.  --  --      .Blood Blood-Peripheral   @ 00:47   No growth at 5 days.  --  --      .Urine Clean Catch (Midstream)   @ 00:19   <10,000 CFU/ml  Normal Urogenital [place] present  --  --          RADIOLOGY:    < from: CT Chest No Cont (17 @ 14:48) >  EXAM:  CT CHEST                            PROCEDURE DATE:  2017          INTERPRETATION:  CLINICAL INFORMATION: Elevated white count.    COMPARISON: Chest radiograph 2017.    PROCEDURE:   CT of the Chest was performed without intravenous contrast.  Sagittal and coronal reformats were performed.      FINDINGS:    CHEST:     LUNGS AND LARGE AIRWAYS: Patent central airways.  Mild bilateral   interlobular septal thickening and groundglass opacity, consistent with   interstitial pulmonary edema. No pulmonary consolidation.  PLEURA: Trace bilateral pleural effusions.  VESSELS: Within normal limits.  HEART: Heart size is normal. No pericardial effusion.  MEDIASTINUM AND BIRD: No lymphadenopathy.  CHEST WALL AND LOWER NECK: Bilateral peripherally calcified breast   implants.  VISUALIZED UPPER ABDOMEN: Within normal limits.  BONES: Mild degenerative changes.    IMPRESSION: Mild interstitial pulmonary edema and trace bilateral pleural   effusions.                    NICOLLE BAILEY M.D., ATTENDING RADIOLOGIST  This document has been electronically signed. Dec  4 2017  4:06PM                < end of copied text >      < from: CT Head No Cont (17 @ 14:42) >  EXAM:  CT BRAIN                            PROCEDURE DATE:  2017          INTERPRETATION:  Head CT without IV contrast     Clinical Indication: weakness    Technique: Axial CT images of the head are obtained from the skull base   to the vertex without IV contrast.    Comparison: None.    Findings: There is no acute intracranial hemorrhage. Small age   indeterminate territorial infarct in the right high frontal lobe   involving the cortex. Mild patchy hypodensities in the periventricular   and subcortical white matter bilaterally, compatible with chronic small   vessel ischemic disease. There is no space-occupying lesion, midline   shift, or herniation. The ventricles maintain normal size, shape, and   location. The sulci are symmetricand normal for age bilaterally. No   extra-axial fluid collection.  The visualized paranasal sinuses and   orbits appear grossly unremarkable.    Impression: No acute intracranial hemorrhage.     Small age indeterminate territorial infarct in the right high frontal   lobe. If clinically indicated, brain MR may be pursued for further   evaluation.    Mild chronic small vessel ischemic disease.    Dr. Eubanks is informed with read back.          < end of copied text >    < from: Xray Chest 1 View AP/PA (17 @ 13:02) >    EXAM:  CHEST SINGLE AP OR PA                            PROCEDURE DATE:  2017          INTERPRETATION:  Portable chest x-ray    Indication: Cough.    Portable chest x-ray is compared to a previous examination dated   2017.    Impression: No evidence for pulmonary consolidation, pleural effusion or   pneumothorax.    The trachea is midline.    The cardiac silhouette is within normal limits.    Stable bilateral breast implants.                JAYSON MIRAMONTES M.D., ATTENDING RADIOLOGIST  This document has been electronically signed. Dec  4 2017  1:50PM                < end of copied text > 77 F with PMH of Asthma, HLD, RA,  migraines and UTI recently treated with PO antibiotics presented to ED with generalized fatigue and malaise from last 3 days. Patient states that she recently had a ED visit and found to have UTI, was sent home on PO antibiotics. Patient had visit PCP for R shoulder pain and was started on PO prednisone last week. Pt was found to have a fever to 101 x 1 episode only and leukocytosis and was started on abx. Pt complaints of SOB and denies fever, nausea, vomiting, diarrhea,cough, rash, constipation, abdominal pain, chest pain, headache.    SH: Non smoker, non alcoholic, denies illicit drug use.   Fh: HTN in Mother.       REVIEW OF SYSTEMS:  [  ] Not able to illicit  General: NAD  Chest: no chest pain, SOB  GI: no abdominal pain no N/V  : no dysuria, no urinary frequency, no hesitancy   Skin: no rash   Musculoskeletal:	 decrease ROM, swan neck deformity bilateral   Neuro: AAOx 3 but keeps repeating herself    PAST MEDICAL & SURGICAL HISTORY:  Rheumatoid arthritis  Migraines  HLD (hyperlipidemia)  Hypothyroid  No significant past surgical history    ALLERGIES: No Known Allergies    MEDS:  ALBUTerol/ipratropium for Nebulization 3 milliLiter(s) Nebulizer every 6 hours  aspirin  chewable 81 milliGRAM(s) Oral daily  atorvastatin 40 milliGRAM(s) Oral at bedtime  heparin  Injectable 5000 Unit(s) SubCutaneous every 8 hours  hydrocortisone sodium succinate Injectable 50 milliGRAM(s) IV Push every 8 hours  levothyroxine 50 MICROGram(s) Oral daily  piperacillin/tazobactam IVPB. 3.375 Gram(s) IV Intermittent every 8 hours  vancomycin  IVPB 1000 milliGRAM(s) IV Intermittent every 24 hours    SOCIAL HISTORY:  Smoker:  ex smoker    FAMILY HISTORY:  Family history of hypertension (Mother)    VITALS:  Vital Signs Last 24 Hrs  T(C): 36.2 (06 Dec 2017 08:00), Max: 36.8 (05 Dec 2017 11:33)  T(F): 97.1 (06 Dec 2017 08:00), Max: 98.2 (05 Dec 2017 11:33)  HR: 88 (06 Dec 2017 08:00) (88 - 117)  BP: 126/77 (06 Dec 2017 08:00) (98/47 - 128/86)  BP(mean): --  RR: 18 (06 Dec 2017 08:00) (15 - 18)  SpO2: 100% (06 Dec 2017 08:00) (98% - 100%)      PHYSICAL EXAM:  Constitutional: NAD  HEENT: dry mucosa  Neck: no JVD, supple , no LN's  Respiratory: bilateral rales at upper lobes and lower lobes - scattered , no wheezing, no rhonchi  Cardiovascular: s1 s2 reg tachycardia, no RMG  Gastrointestinal: soft, non tender, no distention, no guarding, + BS  Extremities: no edema, no cyanosis   Skin: no rashes, no tenderness or erythema on IV line site  Ortho: synovitis of both wrists but no joint swelling or warmth, has some deformity of fingers  Neuro: AAOx3 but forgetful , no focal findings       LABS/DIAGNOSTIC TESTS:                        10.3   18.6  )-----------( 328      ( 06 Dec 2017 07:12 )             32.8     WBC Count: 18.6 K/uL ( @ 07:12)  WBC Count: 23.9 K/uL ( @ 13:55)  WBC Count: 20.8 K/uL ( @ 09:41)  WBC Count: 26.6 K/uL ( @ 12:25)    12-    139  |  109<H>  |  11  ----------------------------<  183<H>  4.5   |  21<L>  |  0.76    Ca    7.9<L>      06 Dec 2017 07:12  Phos  2.7     12-  Mg     2.2     -    TPro  5.8<L>  /  Alb  1.5<L>  /  TBili  0.4  /  DBili  x   /  AST  49<H>  /  ALT  13  /  AlkPhos  309<H>  12-    Urinalysis Basic - ( 04 Dec 2017 16:16 )    Color: Yellow / Appearance: Clear / S.015 / pH: x  Gluc: x / Ketone: Negative  / Bili: Negative / Urobili: Negative   Blood: x / Protein: 30 mg/dL / Nitrite: Negative   Leuk Esterase: Trace / RBC: 0-2 /HPF / WBC 3-5 /HPF   Sq Epi: x / Non Sq Epi: Few /HPF / Bacteria: Few /HPF      LIVER FUNCTIONS - ( 05 Dec 2017 09:41 )  Alb: 1.5 g/dL / Pro: 5.8 g/dL / ALK PHOS: 309 U/L / ALT: 13 U/L DA / AST: 49 U/L / GGT: x           PT/INR - ( 04 Dec 2017 12:25 )   PT: 14.9 sec;   INR: 1.36 ratio         PTT - ( 04 Dec 2017 12:25 )  PTT:28.9 sec    ABG -     CULTURES:   .Urine Clean Catch (Midstream)   @ 23:14   10,000 - 49,000 CFU/mL Escherichia coli  --  --      .Blood Blood   @ 18:28   No growth to date.  --  --      .Blood Blood   @ 18:27   No growth to date.  --  --      .Blood Blood-Peripheral   @ 00:47   No growth at 5 days.  --  --      .Urine Clean Catch (Midstream)   @ 00:19   <10,000 CFU/ml  Normal Urogenital [place] present  --  --          RADIOLOGY:    < from: CT Chest No Cont (17 @ 14:48) >  EXAM:  CT CHEST                            PROCEDURE DATE:  2017          INTERPRETATION:  CLINICAL INFORMATION: Elevated white count.    COMPARISON: Chest radiograph 2017.    PROCEDURE:   CT of the Chest was performed without intravenous contrast.  Sagittal and coronal reformats were performed.      FINDINGS:    CHEST:     LUNGS AND LARGE AIRWAYS: Patent central airways.  Mild bilateral   interlobular septal thickening and groundglass opacity, consistent with   interstitial pulmonary edema. No pulmonary consolidation.  PLEURA: Trace bilateral pleural effusions.  VESSELS: Within normal limits.  HEART: Heart size is normal. No pericardial effusion.  MEDIASTINUM AND BIRD: No lymphadenopathy.  CHEST WALL AND LOWER NECK: Bilateral peripherally calcified breast   implants.  VISUALIZED UPPER ABDOMEN: Within normal limits.  BONES: Mild degenerative changes.    IMPRESSION: Mild interstitial pulmonary edema and trace bilateral pleural   effusions.                    NICOLLE BAILEY M.D., ATTENDING RADIOLOGIST  This document has been electronically signed. Dec  4 2017  4:06PM                < end of copied text >      < from: CT Head No Cont (17 @ 14:42) >  EXAM:  CT BRAIN                            PROCEDURE DATE:  2017          INTERPRETATION:  Head CT without IV contrast     Clinical Indication: weakness    Technique: Axial CT images of the head are obtained from the skull base   to the vertex without IV contrast.    Comparison: None.    Findings: There is no acute intracranial hemorrhage. Small age   indeterminate territorial infarct in the right high frontal lobe   involving the cortex. Mild patchy hypodensities in the periventricular   and subcortical white matter bilaterally, compatible with chronic small   vessel ischemic disease. There is no space-occupying lesion, midline   shift, or herniation. The ventricles maintain normal size, shape, and   location. The sulci are symmetricand normal for age bilaterally. No   extra-axial fluid collection.  The visualized paranasal sinuses and   orbits appear grossly unremarkable.    Impression: No acute intracranial hemorrhage.     Small age indeterminate territorial infarct in the right high frontal   lobe. If clinically indicated, brain MR may be pursued for further   evaluation.    Mild chronic small vessel ischemic disease.    Dr. Eubanks is informed with read back.          < end of copied text >    < from: Xray Chest 1 View AP/PA (17 @ 13:02) >    EXAM:  CHEST SINGLE AP OR PA                            PROCEDURE DATE:  2017          INTERPRETATION:  Portable chest x-ray    Indication: Cough.    Portable chest x-ray is compared to a previous examination dated   2017.    Impression: No evidence for pulmonary consolidation, pleural effusion or   pneumothorax.    The trachea is midline.    The cardiac silhouette is within normal limits.    Stable bilateral breast implants.                JAYSON MIRAMONTES M.D., ATTENDING RADIOLOGIST  This document has been electronically signed. Dec  4 2017  1:50PM                < end of copied text >

## 2017-12-06 NOTE — H&P ADULT - NSHPSOCIALHISTORY_GEN_ALL_CORE
SOCIAL HISTORY:  Smoker: [ ] Yes  [ ] No        PACK YEARS:          Quit date:  ETOH use: [ ] Yes  [ ] No              FREQUENCY / QUANTITY:  Ilicit Drug use:  [ ] Yes  [ ] No  Occupation:   Living arrangements:   Assist device use:

## 2017-12-06 NOTE — DISCHARGE NOTE ADULT - PATIENT PORTAL LINK FT
“You can access the [facility] Patient Portal, offered by [facility], by registering with the following website: [email or web address]”

## 2017-12-06 NOTE — PROGRESS NOTE ADULT - PROBLEM SELECTOR PLAN 4
Elevated WBC count   Patient was receiving Oral prednisone for 7 days  CALLED PHARMACY PATIENT WAS RECEIVING A COURSE OF PREDNISONE 20MG FOR 2 DAYS THEN 10MG FOR 2 DAYS AND 5 MG FOR 3 DAYS Elevated WBC count   Patient was receiving Oral prednisone for 7 days  CALLED PHARMACY PATIENT WAS RECEIVING A COURSE OF PREDNISONE 20MG FOR 2 DAYS THEN 10MG FOR 2 DAYS AND 5 MG FOR 3 DAYS  Received Stress Dose of steroids due to low blood pressure  Dr Trinidad Consulted

## 2017-12-06 NOTE — PROGRESS NOTE ADULT - PROBLEM SELECTOR PLAN 3
Echo cardiogram significant for Pulmonary HTN  Pulmonary Evaluation recommended Echo cardiogram significant for Pulmonary HTN  Pulmonary Evaluation recommended: Dr Funez

## 2017-12-06 NOTE — DISCHARGE NOTE ADULT - ABILITY TO HEAR (WITH HEARING AID OR HEARING APPLIANCE IF NORMALLY USED):
pt has cataract/Mildly to Moderately Impaired: difficulty hearing in some environments or speaker may need to increase volume or speak distinctly

## 2017-12-06 NOTE — DISCHARGE NOTE ADULT - PLAN OF CARE
Continue with care at [facility] You were admitted with concern for stroke and found to have bilateral small emboli throughout your brain. Your echocardiogram revealed evidence of a cardiac mass so we decided to transfer you to [facility], where you will undergo transthoracic echocardiogram testing and definitive surgical management as needed.

## 2017-12-06 NOTE — PROGRESS NOTE ADULT - PROBLEM SELECTOR PLAN 1
No neurological deficits  CT scan of head: Small age indeterminate territorial infarct in the right high frontal   lobe. Mild chronic small vessel ischemic disease.  MRI and MRA pending  PT: home w/ home PT  As per Dr Milner: AMAN tomorrow

## 2017-12-06 NOTE — CONSULT NOTE ADULT - SUBJECTIVE AND OBJECTIVE BOX
Source: The patient and chart    Reason for Consultation: Elevated pulmonary artery pressure on echocardiogram    Chief Complaint: Weakness and fatigue    HPI: The patient is a 76 y/o female with a PMH of recently dx asthma, rheumatoid arthritis who presented to the hospital with nonspecific symptoms of weakness and fatigue after being tapered off prednisone which she was given for shoulder pain. She was currently being worked up in the hospital for a possible stroke vs infection when an echocardiogram revealed elevated pulmonary artery pressures but was a limited stuff with a lesion seen on her mitral valve. The patient is otherwise asymptomatic with no shortness of breath on exertion, no chest pain, no leg swelling, no orthopnea and PNR. She states she has had prior echocardiograms but doesnt recall any details and sees Dr Maria at [facility] for her pulmonary issues. She recent weightloss, or night sweats. She is not on any immunomodulators for her rheumatoid arthritis      MEDICATIONS  (STANDING):  ALBUTerol/ipratropium for Nebulization 3 milliLiter(s) Nebulizer every 6 hours  aspirin  chewable 81 milliGRAM(s) Oral daily  atorvastatin 40 milliGRAM(s) Oral at bedtime  heparin  Injectable 5000 Unit(s) SubCutaneous every 8 hours  hydrocortisone sodium succinate Injectable 50 milliGRAM(s) IV Push every 8 hours  levothyroxine 50 MICROGram(s) Oral daily  piperacillin/tazobactam IVPB. 3.375 Gram(s) IV Intermittent every 8 hours  vancomycin  IVPB 1000 milliGRAM(s) IV Intermittent every 24 hours    MEDICATIONS  (PRN):    Allergies    No Known Allergies    Intolerances    PAST MEDICAL & SURGICAL HISTORY:  Rheumatoid arthritis  Migraines  HLD (hyperlipidemia)  Hypothyroid  No significant past surgical history      FAMILY HISTORY:  Family history of hypertension (Mother)      SOCIAL HISTORY  Smoking History: Exsmoker, minimal previous smoking history  Alcohol: No ETOH  Drugs: Denies    T(C): 36.2 (17 @ 11:38), Max: 36.4 (17 @ 20:20)  HR: 99 (17 @ 11:38) (88 - 117)  BP: 118/58 (17 @ 11:38) (98/47 - 128/86)  RR: 18 (17 @ 11:38) (16 - 18)  SpO2: 100% (17 @ 11:38) (98% - 100%)    LABS:                        10.3   18.6  )-----------( 328      ( 06 Dec 2017 07:12 )             32.8     12-    139  |  109<H>  |  11  ----------------------------<  183<H>  4.5   |  21<L>  |  0.76    Ca    7.9<L>      06 Dec 2017 07:12  Phos  2.7     12  Mg     2.2     12    TPro  5.8<L>  /  Alb  1.5<L>  /  TBili  0.4  /  DBili  x   /  AST  49<H>  /  ALT  13  /  AlkPhos  309<H>  12    PT/INR - ( 04 Dec 2017 12:25 )   PT: 14.9 sec;   INR: 1.36 ratio         PTT - ( 04 Dec 2017 12:25 )  PTT:28.9 sec  Urinalysis Basic - ( 04 Dec 2017 16:16 )    Color: Yellow / Appearance: Clear / S.015 / pH: x  Gluc: x / Ketone: Negative  / Bili: Negative / Urobili: Negative   Blood: x / Protein: 30 mg/dL / Nitrite: Negative   Leuk Esterase: Trace / RBC: 0-2 /HPF / WBC 3-5 /HPF   Sq Epi: x / Non Sq Epi: Few /HPF / Bacteria: Few /HPF    CARDIAC MARKERS ( 04 Dec 2017 18:37 )  0.606 ng/mL / x     / 23 U/L / x     / <1.0 ng/mL  CARDIAC MARKERS ( 04 Dec 2017 12:24 )  0.648 ng/mL / x     / x     / x     / x        Microbiology  Culture Results:   10,000 - 49,000 CFU/mL Escherichia coli ( @ 23:14)  Culture Results:   No growth to date. ( @ 18:28)  Culture Results:   No growth to date. ( @ 18:27)      RADIOLOGY & ADDITIONAL STUDIES: ([place]) CXR-- no infiltrates, bilateral breast implants  CT chest without contrast- mosaic attenuation pattern with small bilateral effusions

## 2017-12-06 NOTE — DIETITIAN INITIAL EVALUATION ADULT. - OTHER INFO
nutrition consult requested for assessment, education; lives home with family; skin intact; denied GI distress, chewing or swallowing problem at present, food choices obtained, decreased appetite x a few days, 80% intake at times per flow sheet nutrition consult requested for assessment, education; lives home with family; skin intact; denied GI distress, chewing or swallowing problem at present, food choices obtained, decreased appetite x a few days, 80% intake at time per flow sheet

## 2017-12-06 NOTE — DIETITIAN INITIAL EVALUATION ADULT. - NS FNS WEIGHT USED FOR CALC
Ht=5' 1"   ILT=574 lb   Admission hd=185 lb   BMI=21.7    Current ug=775.4 lb 12/4/17-->131.3 lb 12/6/17 ? may due to scale variance, usual sj=797 to 120 per pt/ideal

## 2017-12-06 NOTE — DISCHARGE NOTE ADULT - HOSPITAL COURSE
77 F with pmh of Asthma, hld, migraines and UTI recently treated with PO antibiotics presented to ED with generalized fatigue and malaise from last 3 days. Patient states that she recently had a ED visit and found to have UTI, was sent home on PO antibiotics after iv hydration. Patient had visit to PCP for R shoulder pain and was treated with PO prednisone.   Admitted to the floor for Encephalopathy R/O Stroke vs Infection    On the floors, patient underwent MRI testing which revealed bilateral cerebral emboli. TTE was consistent with a cardiac mass so patient was transferred to [facility] for [facility] and potential definitive surgical management.    Given patient's improved clinical status and current hemodynamic stability, decision was made to discharge.  Please refer to patient's complete medical chart with documents for a full hospital course, for this is only a brief summary.

## 2017-12-06 NOTE — PROGRESS NOTE ADULT - ATTENDING COMMENTS
Patient was seen and examined at bedside, her daughter at her side,   Patient continues to state episodes of weakness, SOB, it happened today after eating then resolved. MRI of the head showing bilateral acute infarcts suggesting central embolic disease. Echocardiogram yesterday showing a mass in the Mitral valve area/left ventricle.  Patient continues to be asymptomatic except for some memory loss/ confusion.  I discussed again with cardiology, and neurology. I doubt anticoagulation will be of any benefit giving that most likely the mass in the left ventricle is throwing microemboli to the brain. Rather anticoagulation may pose a risk of hemorrhagic conversion.  Tele: few episodes of Tachycardia noted up to 120.  Physical exam: vitals better   HEENT: Neck supple  Heart: S1 S2 normal  Abdomen: NT, ND  Chest: clear  LE: No LE edema.  A/P  1- Acute strokes, central embolic disease:   suspecting that the strokes are coming from broken down tissues from the mass in the heart.  Unlikely anticoagulation may help, she is going for AMAN in am, then will need probably to be transferred for [place] for  cardiothoracic surgery.   continue with subQ heparin and asa.  BP is normal, improved little after starting hydrocortisone.   Neurology input pending    2- Mass in the heart. Unclear etiology, Myxoma?  cardio input appreciated  Cardio thoracic surgery consultation:  Unclear benefit of anticoagulation, I will speak to the daughter to explain to her.    3- Suspecting adrenal insufficiency: continue with hydrocortisone 50 Q 8, to taper down tomorrow.  Iatrogenic secondary to prednisone.    4- Leukocytosis: improving,  ID input appreciated.  continue rocephin.    5- Pulmonary hypertension: pulm input appreciated,    rest as above.
Patient seen and examined at bedside, feeling ok, her daughter at her side. I explained to her that the ct shows a stroke in the right frontal lobe and this may explain the change in behavior, sleepiness that she noted 2 weeks ago.  They deny any prior knowledge of any stroke, was not on asa outpatient.  Her WBC is trending down, she was placed on broad spectrum antibiotics pending blood culture results and ID input.  Her BP continues to be borderline low, she recently was started on Prednisone taper, which she may have confused as she says she was getting 1 tablet/day, and still has a tablet to take. The prednisone may explain the High WBC count as well as the low BP ( adrenal insufficiency).  Physical exam  Vitals: BP borderline low, tele monitor: episodes of tachy up to 140-120.  HEENT: Neck supple  Heart: S1, S2 normal  Abdomen: NT, ND  Chest: Clear  LE: No LE edema  A/P  1- Stroke: continue tele monitor  cardio input appreciated  MRI pending  asa/heparin/ statin  check lipid panel    2- Arrythmia: her heart rate goes up to 140, will continue to hold metoprolol in light of stroke, till acute one is ruled out  also BP is borderline low.    3- Hypothyroidism: check TSH  4- Elevated troponins: demand ischemia, echo, stress outpatient  f.u cardio

## 2017-12-06 NOTE — H&P ADULT - NSHPREVIEWOFSYSTEMS_GEN_ALL_CORE
Review of Systems  GENERAL:  Fevers[] chills[] sweats[] fatigue[] weight loss[] weight gain []                                        NEURO:  parathesias[] seizures []  syncope []  confusion []                                                                                  EYES: glasses[]  blurry vision[]  discharge[] pain[] glaucoma []                                                                            ENMT:  difficulty hearing []  vertigo[]  dysphagia[] epistaxis[] recent dental work []                                      CV:  chest pain[] palpitations[] MONTGOMERY [] diaphoresis [] edema[]                                                                                             RESPIRATORY:  wheezing[] SOB[] cough [] sputum[] hemoptysis[]                                                                    GI:  nausea[]  vomiting []  diarrhea[] constipation [] melena []                                                                        : hematuria[ ]  dysuria[ ] urgency[] incontinence[]                                                                                              MUSKULOSKELETAL:  arthritis[ ]  joint swelling [ ] muscle weakness [ ]                                                                  SKIN/BREAST:  rash[ ] itching [ ]  hair loss[ ] masses[ ]                                                                                                PSYCH:  dementia [ ] depresion [ ] anxiety[ ]                                                                                                                  HEME/LYMPH:  bruises easily[ ] enlarged lymph nodes[ ] tender lymph nodes[ ]                                                 ENDOCRINE:  cold intolerance[ ] heat intolerance[ ] polydipsia[ ] Review of Systems  GENERAL:  Fevers[] chills[] sweats[] fatigue[] weight loss[] weight gain []                                        NEURO:  parathesias[] seizures []  syncope []  confusion []                                                                                  EYES: glasses[]  blurry vision[]  discharge[] pain[] glaucoma []                                                                            ENMT:  difficulty hearing []  vertigo[]  dysphagia[] epistaxis[] recent dental work []                                      CV:  chest pain[] palpitations[] MONTGOMERY [] diaphoresis [] edema[]                                                                                             RESPIRATORY:  wheezing[] SOB[x] cough [] sputum[] hemoptysis[]                                                                    GI:  nausea[]  vomiting []  diarrhea[] constipation [] melena []                                                                        : hematuria[ ]  dysuria[ ] urgency[] incontinence[]                                                                                              MUSKULOSKELETAL:  arthritis[ x]  joint swelling [ ] muscle weakness [ x]                                                                  SKIN/BREAST:  rash[ ] itching [ ]  hair loss[ ] masses[ ]                                                                                                PSYCH:  dementia [ ] depression [ ] anxiety[ ]                                                                                                                  HEME/LYMPH:  bruises easily[ ] enlarged lymph nodes[ ] tender lymph nodes[ ]                                                 ENDOCRINE:  cold intolerance[ ] heat intolerance[ ] polydipsia[ ]

## 2017-12-06 NOTE — H&P ADULT - PROBLEM SELECTOR PLAN 1
echodensity noted on TTE  transferred for AMAN r/o myxoma vs endocarditis  NPO after midnight  cardiology consult/follow up

## 2017-12-06 NOTE — H&P ADULT - PROBLEM SELECTOR PLAN 4
continue IV ABX   infectious disease consult continue IV ABX: ? isolation for ESBL E. Coli  infectious disease consult

## 2017-12-06 NOTE — PROGRESS NOTE ADULT - ASSESSMENT
77 F with pmh of Asthma, hld, migraines and UTI recently treated with PO antibiotics presented to ED with generalized fatigue and malaise from last 3 days. Patient states that she recently had a ED visit and found to have UTI, was sent home on PO antibiotics after iv hydration. Patient had visit to PCP for R shoulder pain and was treated with PO prednisone.   Admitted to the floor for Encephalopathy R/O Stroke vs Infection
77 F with pmh of Asthma, hld, migraines and UTI recently treated with PO antibiotics presented to ED with generalized fatigue and malaise from last 3 days. Patient states that she recently had a ED visit and found to have UTI, was sent home on PO antibiotics after iv hydration. Patient had visit to PCP for R shoulder pain and was treated with PO prednisone.   Admitted to the floor for Encephalopathy R/O Stroke vs Infection

## 2017-12-06 NOTE — PROGRESS NOTE ADULT - SUBJECTIVE AND OBJECTIVE BOX
==================PGY 1 Note===================   Discussed with supervising resident and primary attending    ================CHIEF COMPLAINT===============  Patient is a 77y old  Female who presents with a chief complaint of Weakness and fatigue x 3 days (04 Dec 2017 16:25)        =========INTERVAL HPI/OVERNIGHT EVENTS=========  Offers no new complaints; current symptoms resolving      ============CURRENT MEDICATIONS===============    MEDICATIONS  (STANDING):  ALBUTerol/ipratropium for Nebulization 3 milliLiter(s) Nebulizer every 6 hours  aspirin  chewable 81 milliGRAM(s) Oral daily  atorvastatin 40 milliGRAM(s) Oral at bedtime  heparin  Injectable 5000 Unit(s) SubCutaneous every 8 hours  hydrocortisone sodium succinate Injectable 50 milliGRAM(s) IV Push every 8 hours  levothyroxine 50 MICROGram(s) Oral daily  piperacillin/tazobactam IVPB. 3.375 Gram(s) IV Intermittent every 8 hours  vancomycin  IVPB 1000 milliGRAM(s) IV Intermittent every 24 hours    MEDICATIONS  (PRN):        ============REVIEW OF SYSTEMS==================    CONSTITUTIONAL: No fever  EYES: no acute visual disturbances  NECK: No pain or stiffness  RESPIRATORY: No cough; No shortness of breath  CARDIOVASCULAR: No chest pain, no palpitations  GASTROINTESTINAL: No pain. No nausea or vomiting; No diarrhea   NEUROLOGICAL: No headache or numbness, no tremors  MUSCULOSKELETAL: No joint pain, no muscle pain  GENITOURINARY: no dysuria, no frequency, no hesitancy  PSYCHIATRY: no depression , no anxiety  ALL OTHER  ROS negative      ================VITALS SIGNS=====================  Vital Signs Last 24 Hrs  T(C): 36.4 (06 Dec 2017 05:12), Max: 36.8 (05 Dec 2017 11:33)  T(F): 97.5 (06 Dec 2017 05:12), Max: 98.2 (05 Dec 2017 11:33)  HR: 89 (06 Dec 2017 05:12) (89 - 117)  BP: 128/86 (06 Dec 2017 05:12) (98/47 - 128/86)  BP(mean): --  RR: 16 (06 Dec 2017 05:12) (15 - 16)  SpO2: 100% (06 Dec 2017 05:12) (98% - 100%)    ===============PHYSICAL EXAM====================    GENERAL: NAD  HEENT: Normocephalic;  conjunctivae and sclerae clear; moist mucous membranes;   NECK : supple  CHEST/LUNG: Clear to auscultation bilaterally with good air entry   HEART: S1 S2  regular; no murmurs, gallops or rubs  ABDOMEN: Soft, Nontender, Nondistended; Bowel sounds present  EXTREMITIES: no cyanosis; no edema; no calf tenderness  SKIN: warm and dry; no rash  NERVOUS SYSTEM:  Awake and alert; Oriented  to place, person and time ; No Neurological deficits    ==============LABORATORIES======================  LABS:                        10.6   23.9  )-----------( 240      ( 05 Dec 2017 13:55 )             34.0     12-05    140  |  111<H>  |  12  ----------------------------<  118<H>  3.7   |  21<L>  |  0.68    Ca    7.8<L>      05 Dec 2017 09:41  Phos  2.7     12-05  Mg     2.2     12-05    TPro  5.8<L>  /  Alb  1.5<L>  /  TBili  0.4  /  DBili  x   /  AST  49<H>  /  ALT  13  /  AlkPhos  309<H>  12-05    PT/INR - ( 04 Dec 2017 12:25 )   PT: 14.9 sec;   INR: 1.36 ratio         PTT - ( 04 Dec 2017 12:25 )  PTT:28.9 sec  Urinalysis Basic - ( 04 Dec 2017 16:16 )    Color: Yellow / Appearance: Clear / S.015 / pH: x  Gluc: x / Ketone: Negative  / Bili: Negative / Urobili: Negative   Blood: x / Protein: 30 mg/dL / Nitrite: Negative   Leuk Esterase: Trace / RBC: 0-2 /HPF / WBC 3-5 /HPF   Sq Epi: x / Non Sq Epi: Few /HPF / Bacteria: Few /HPF      CAPILLARY BLOOD GLUCOSE          =============INPUTS/OUPUTS=====================    17 @ 07:01  -  17 @ 07:00  --------------------------------------------------------  IN: 850 mL / OUT: 0 mL / NET: 850 mL          RADIOLOGY & ADDITIONAL TESTS:    Imaging Personally Reviewed:  YES    Consultant(s) Notes Reviewed:   YES    Care Discussed with Consultants : YES    Plan of care was discussed with patient  and /or primary care giver; all questions and concerns were addressed and care was aligned with patient's wishes. Time was allowed for questions that were answered to the best of my abilities
==================PGY 1 Note===================   Discussed with supervising resident and primary attending    ================CHIEF COMPLAINT===============  Patient is a 77y old  Female who presents with a chief complaint of Weakness and fatigue x 3 days (04 Dec 2017 16:25)        =========INTERVAL HPI/OVERNIGHT EVENTS=========  Offers no new complaints; current symptoms resolving      ============CURRENT MEDICATIONS===============    MEDICATIONS  (STANDING):  aspirin  chewable 81 milliGRAM(s) Oral daily  atorvastatin 40 milliGRAM(s) Oral at bedtime  levothyroxine 50 MICROGram(s) Oral daily  piperacillin/tazobactam IVPB. 3.375 Gram(s) IV Intermittent every 8 hours  vancomycin  IVPB 1000 milliGRAM(s) IV Intermittent every 24 hours    MEDICATIONS  (PRN):        ============REVIEW OF SYSTEMS==================    CONSTITUTIONAL: No fever  EYES: no acute visual disturbances  NECK: No pain or stiffness  RESPIRATORY: No cough; No shortness of breath  CARDIOVASCULAR: No chest pain, no palpitations  GASTROINTESTINAL: No pain. No nausea or vomiting; No diarrhea   NEUROLOGICAL: No headache or numbness, no tremors  MUSCULOSKELETAL: No joint pain, no muscle pain  GENITOURINARY: no dysuria, no frequency, no hesitancy  PSYCHIATRY: no depression , no anxiety  ALL OTHER  ROS negative      ================VITALS SIGNS=====================  Vital Signs Last 24 Hrs  T(C): 37 (05 Dec 2017 04:52), Max: 38.3 (04 Dec 2017 22:21)  T(F): 98.6 (05 Dec 2017 04:52), Max: 101 (04 Dec 2017 22:21)  HR: 86 (05 Dec 2017 04:52) (86 - 112)  BP: 104/78 (05 Dec 2017 04:52) (94/60 - 119/54)  BP(mean): --  RR: 20 (05 Dec 2017 04:52) (16 - 21)  SpO2: 100% (05 Dec 2017 04:52) (99% - 100%)    ===============PHYSICAL EXAM====================    GENERAL: NAD  HEENT: Normocephalic;  conjunctivae and sclerae clear; moist mucous membranes;   NECK : supple  CHEST/LUNG: Clear to auscultation bilaterally with good air entry   HEART: S1 S2  regular; no murmurs, gallops or rubs  ABDOMEN: Soft, Nontender, Nondistended; Bowel sounds present  EXTREMITIES: no cyanosis; no edema; no calf tenderness  SKIN: warm and dry; no rash  NERVOUS SYSTEM:  Awake and alert; Oriented  to place, person and time ; no new deficits    ==============LABORATORIES======================  LABS:                        10.6   26.6  )-----------( 194      ( 04 Dec 2017 12:25 )             34.1     12-04    137  |  105  |  14  ----------------------------<  147<H>  3.9   |  23  |  0.82    Ca    7.8<L>      04 Dec 2017 12:24    TPro  6.3  /  Alb  1.7<L>  /  TBili  0.4  /  DBili  x   /  AST  76<H>  /  ALT  15  /  AlkPhos  372<H>  12-04    PT/INR - ( 04 Dec 2017 12:25 )   PT: 14.9 sec;   INR: 1.36 ratio         PTT - ( 04 Dec 2017 12:25 )  PTT:28.9 sec  Urinalysis Basic - ( 04 Dec 2017 16:16 )    Color: Yellow / Appearance: Clear / S.015 / pH: x  Gluc: x / Ketone: Negative  / Bili: Negative / Urobili: Negative   Blood: x / Protein: 30 mg/dL / Nitrite: Negative   Leuk Esterase: Trace / RBC: 0-2 /HPF / WBC 3-5 /HPF   Sq Epi: x / Non Sq Epi: Few /HPF / Bacteria: Few /HPF      CAPILLARY BLOOD GLUCOSE          =============INPUTS/OUPUTS=====================    17 @ 07:01  -  17 @ 07:00  --------------------------------------------------------  IN: 340 mL / OUT: 0 mL / NET: 340 mL          RADIOLOGY & ADDITIONAL TESTS:    Imaging Personally Reviewed:  YES    Consultant(s) Notes Reviewed:   YES    Care Discussed with Consultants : YES    Plan of care was discussed with patient  and /or primary care giver; all questions and concerns were addressed and care was aligned with patient's wishes. Time was allowed for questions that were answered to the best of my abilities

## 2017-12-06 NOTE — H&P ADULT - HISTORY OF PRESENT ILLNESS
76 yo F with asthma, pulmomary HTN, HLD who presented to [facility] with weakness, dyspnea, palpitations, and AMS.  The patient endorses that she was in her usual state of health until a few weeks ago when she developed back and shoulder pain after moving furniture was treated with prednisone.  Her symptoms did not resolve and received treatment for a UTI as well.   She became concerned as she is typically very active and spends her summer at a swim club and can typically ascend stairs without dyspnea.  Work-up revealed an echodensity no  Consults at [facility]:   Pulmonary  ID  Cardiology 76 yo F with rheumatoid arthritis, asthma, pulmomary HTN, HLD who presented to [facility] with weakness and dyspnea. The patient endorses that she was in her usual state of health until a few weeks ago when she developed back and shoulder pain after moving furniture was treated with prednisone.  Her symptoms did not resolve and received treatment with prednisone.  She was persistently feeling unwell and presented to the ER where she received hydration and was treated for a UTI and released.   She became concerned as she is typically very active and spends her summer at a swim club and can typically ascend stairs without dyspnea.  She returned to the emergency room at [facility] and underewent a TTE revealed an echodensity noted at the base of the posterior wall of the LV and attached to LV septum of unclear etiology.    Transferred to [facility] for further management.  Consults at [facility]:   Pulmonary  ID  Cardiology

## 2017-12-06 NOTE — PROGRESS NOTE ADULT - PROBLEM SELECTOR PLAN 5
C/W Statin  Check Lipid Profile in AM: __________ C/W Statin  Check Lipid Profile in AM: Elevated TG

## 2017-12-06 NOTE — DIETITIAN INITIAL EVALUATION ADULT. - SOURCE
chart review. Pt alert, oriented, well-communicated, but very weak, eye covered with eye blinder, ready to rest/other (specify)/patient

## 2017-12-07 DIAGNOSIS — R06.02 SHORTNESS OF BREATH: ICD-10-CM

## 2017-12-07 DIAGNOSIS — Z29.9 ENCOUNTER FOR PROPHYLACTIC MEASURES, UNSPECIFIED: ICD-10-CM

## 2017-12-07 DIAGNOSIS — R06.00 DYSPNEA, UNSPECIFIED: ICD-10-CM

## 2017-12-07 DIAGNOSIS — R53.83 OTHER FATIGUE: ICD-10-CM

## 2017-12-07 DIAGNOSIS — N39.0 URINARY TRACT INFECTION, SITE NOT SPECIFIED: ICD-10-CM

## 2017-12-07 DIAGNOSIS — I27.20 PULMONARY HYPERTENSION, UNSPECIFIED: ICD-10-CM

## 2017-12-07 DIAGNOSIS — R93.1 ABNORMAL FINDINGS ON DIAGNOSTIC IMAGING OF HEART AND CORONARY CIRCULATION: ICD-10-CM

## 2017-12-07 DIAGNOSIS — J45.20 MILD INTERMITTENT ASTHMA, UNCOMPLICATED: ICD-10-CM

## 2017-12-07 DIAGNOSIS — E03.9 HYPOTHYROIDISM, UNSPECIFIED: ICD-10-CM

## 2017-12-07 DIAGNOSIS — R91.8 OTHER NONSPECIFIC ABNORMAL FINDING OF LUNG FIELD: ICD-10-CM

## 2017-12-07 DIAGNOSIS — J45.909 UNSPECIFIED ASTHMA, UNCOMPLICATED: ICD-10-CM

## 2017-12-07 DIAGNOSIS — I51.89 OTHER ILL-DEFINED HEART DISEASES: ICD-10-CM

## 2017-12-07 DIAGNOSIS — R47.89 OTHER SPEECH DISTURBANCES: ICD-10-CM

## 2017-12-07 DIAGNOSIS — N30.00 ACUTE CYSTITIS WITHOUT HEMATURIA: ICD-10-CM

## 2017-12-07 PROBLEM — Z00.00 ENCOUNTER FOR PREVENTIVE HEALTH EXAMINATION: Status: ACTIVE | Noted: 2017-12-07

## 2017-12-07 LAB
ANION GAP SERPL CALC-SCNC: 11 MMOL/L — SIGNIFICANT CHANGE UP (ref 5–17)
BASOPHILS # BLD AUTO: 0.1 K/UL — SIGNIFICANT CHANGE UP (ref 0–0.2)
BASOPHILS NFR BLD AUTO: 0.3 % — SIGNIFICANT CHANGE UP (ref 0–2)
BLD GP AB SCN SERPL QL: NEGATIVE — SIGNIFICANT CHANGE UP
BLD GP AB SCN SERPL QL: NEGATIVE — SIGNIFICANT CHANGE UP
BUN SERPL-MCNC: 17 MG/DL — SIGNIFICANT CHANGE UP (ref 7–23)
CALCIUM SERPL-MCNC: 8.5 MG/DL — SIGNIFICANT CHANGE UP (ref 8.4–10.5)
CHLORIDE SERPL-SCNC: 105 MMOL/L — SIGNIFICANT CHANGE UP (ref 96–108)
CO2 SERPL-SCNC: 21 MMOL/L — LOW (ref 22–31)
CREAT SERPL-MCNC: 0.94 MG/DL — SIGNIFICANT CHANGE UP (ref 0.5–1.3)
EOSINOPHIL # BLD AUTO: 0.1 K/UL — SIGNIFICANT CHANGE UP (ref 0–0.5)
EOSINOPHIL NFR BLD AUTO: 0.5 % — SIGNIFICANT CHANGE UP (ref 0–6)
GLUCOSE SERPL-MCNC: 176 MG/DL — HIGH (ref 70–99)
HCT VFR BLD CALC: 34.9 % — SIGNIFICANT CHANGE UP (ref 34.5–45)
HGB BLD-MCNC: 11 G/DL — LOW (ref 11.5–15.5)
INR BLD: 1.21 RATIO — HIGH (ref 0.88–1.16)
INTERPRETATION SERPL IFE-IMP: SIGNIFICANT CHANGE UP
LYMPHOCYTES # BLD AUTO: 10.1 % — LOW (ref 13–44)
LYMPHOCYTES # BLD AUTO: 2 K/UL — SIGNIFICANT CHANGE UP (ref 1–3.3)
MCHC RBC-ENTMCNC: 28.3 PG — SIGNIFICANT CHANGE UP (ref 27–34)
MCHC RBC-ENTMCNC: 31.6 GM/DL — LOW (ref 32–36)
MCV RBC AUTO: 89.6 FL — SIGNIFICANT CHANGE UP (ref 80–100)
MONOCYTES # BLD AUTO: 0.6 K/UL — SIGNIFICANT CHANGE UP (ref 0–0.9)
MONOCYTES NFR BLD AUTO: 3 % — SIGNIFICANT CHANGE UP (ref 2–14)
NEUTROPHILS # BLD AUTO: 17.4 K/UL — HIGH (ref 1.8–7.4)
NEUTROPHILS NFR BLD AUTO: 86 % — HIGH (ref 43–77)
PLATELET # BLD AUTO: 494 K/UL — HIGH (ref 150–400)
POTASSIUM SERPL-MCNC: 4.4 MMOL/L — SIGNIFICANT CHANGE UP (ref 3.5–5.3)
POTASSIUM SERPL-SCNC: 4.4 MMOL/L — SIGNIFICANT CHANGE UP (ref 3.5–5.3)
PROTHROM AB SERPL-ACNC: 13.1 SEC — HIGH (ref 9.8–12.7)
RAPID RVP RESULT: SIGNIFICANT CHANGE UP
RBC # BLD: 3.9 M/UL — SIGNIFICANT CHANGE UP (ref 3.8–5.2)
RBC # FLD: 14.9 % — HIGH (ref 10.3–14.5)
RH IG SCN BLD-IMP: POSITIVE — SIGNIFICANT CHANGE UP
RH IG SCN BLD-IMP: POSITIVE — SIGNIFICANT CHANGE UP
SODIUM SERPL-SCNC: 137 MMOL/L — SIGNIFICANT CHANGE UP (ref 135–145)
WBC # BLD: 20.3 K/UL — HIGH (ref 3.8–10.5)
WBC # FLD AUTO: 20.3 K/UL — HIGH (ref 3.8–10.5)

## 2017-12-07 PROCEDURE — 93312 ECHO TRANSESOPHAGEAL: CPT | Mod: 26

## 2017-12-07 PROCEDURE — 93306 TTE W/DOPPLER COMPLETE: CPT | Mod: 26

## 2017-12-07 PROCEDURE — 99223 1ST HOSP IP/OBS HIGH 75: CPT

## 2017-12-07 PROCEDURE — 71275 CT ANGIOGRAPHY CHEST: CPT | Mod: 26

## 2017-12-07 PROCEDURE — 99232 SBSQ HOSP IP/OBS MODERATE 35: CPT

## 2017-12-07 PROCEDURE — 70553 MRI BRAIN STEM W/O & W/DYE: CPT | Mod: 26

## 2017-12-07 PROCEDURE — 76376 3D RENDER W/INTRP POSTPROCES: CPT | Mod: 26

## 2017-12-07 RX ORDER — IPRATROPIUM/ALBUTEROL SULFATE 18-103MCG
3 AEROSOL WITH ADAPTER (GRAM) INHALATION EVERY 6 HOURS
Qty: 0 | Refills: 0 | Status: DISCONTINUED | OUTPATIENT
Start: 2017-12-07 | End: 2017-12-12

## 2017-12-07 RX ORDER — ERTAPENEM SODIUM 1 G/1
1000 INJECTION, POWDER, LYOPHILIZED, FOR SOLUTION INTRAMUSCULAR; INTRAVENOUS EVERY 24 HOURS
Qty: 0 | Refills: 0 | Status: DISCONTINUED | OUTPATIENT
Start: 2017-12-07 | End: 2017-12-11

## 2017-12-07 RX ORDER — BUDESONIDE, MICRONIZED 100 %
0.5 POWDER (GRAM) MISCELLANEOUS EVERY 12 HOURS
Qty: 0 | Refills: 0 | Status: DISCONTINUED | OUTPATIENT
Start: 2017-12-07 | End: 2017-12-12

## 2017-12-07 RX ADMIN — Medication 0.5 MILLIGRAM(S): at 18:06

## 2017-12-07 RX ADMIN — Medication 325 MILLIGRAM(S): at 15:49

## 2017-12-07 RX ADMIN — Medication 3 MILLILITER(S): at 23:38

## 2017-12-07 RX ADMIN — LORATADINE 10 MILLIGRAM(S): 10 TABLET ORAL at 15:49

## 2017-12-07 RX ADMIN — HEPARIN SODIUM 5000 UNIT(S): 5000 INJECTION INTRAVENOUS; SUBCUTANEOUS at 15:49

## 2017-12-07 RX ADMIN — Medication 50 MICROGRAM(S): at 05:01

## 2017-12-07 RX ADMIN — PANTOPRAZOLE SODIUM 40 MILLIGRAM(S): 20 TABLET, DELAYED RELEASE ORAL at 05:01

## 2017-12-07 RX ADMIN — ATORVASTATIN CALCIUM 40 MILLIGRAM(S): 80 TABLET, FILM COATED ORAL at 22:13

## 2017-12-07 RX ADMIN — Medication 3 MILLILITER(S): at 18:07

## 2017-12-07 RX ADMIN — HEPARIN SODIUM 5000 UNIT(S): 5000 INJECTION INTRAVENOUS; SUBCUTANEOUS at 22:13

## 2017-12-07 RX ADMIN — ERTAPENEM SODIUM 120 MILLIGRAM(S): 1 INJECTION, POWDER, LYOPHILIZED, FOR SOLUTION INTRAMUSCULAR; INTRAVENOUS at 22:14

## 2017-12-07 RX ADMIN — HEPARIN SODIUM 5000 UNIT(S): 5000 INJECTION INTRAVENOUS; SUBCUTANEOUS at 05:01

## 2017-12-07 NOTE — PROGRESS NOTE ADULT - ASSESSMENT
77 F with pmh of Asthma, hld, migraines and UTI recently treated with PO antibiotics presented to ED with generalized fatigue and malaise from last 3 days. Patient states that she recently had a ED visit and found to have UTI, was sent home on PO antibiotics after iv hydration. Patient had visit to PCP for R shoulder pain and was treated with PO prednisone.   Admitted to the floor for Encephalopathy R/O Stroke vs Infection. Echo- done shows myxoma vs endocarditis - tr. to [place] from [facility] for further cardiac work-up    Hospital course:  12/7/17 - AMAN done-as per Dr. Landaverde pt will   	need a Cardiac MRI to evaluate for cardiac tumor  Hx ESBL UTI - e. coli-tx'd w/ IV Josiah - Dr. Monson - ID consult appreciated  Head CT shows multiple small infarcts in brain - neuro consult called ()  MRI/MRA head & Neck ordered to assess for Mycotic Aneurysm as per Neuro  Pulmonary consult called -Dr.Mitch Nguyen - chest ct done 12/4/17 -hx asthma  Cardiology consulted d- Dr. Sorin Christensen for management    Plan - R/O cardiac thrombus vs tumor - treatment plan will depend on result of Cardiac MRI  d/c planning

## 2017-12-07 NOTE — CONSULT NOTE ADULT - PROBLEM SELECTOR RECOMMENDATION 3
-await echo r/o mass vs thrombus vs vegetation  -await echo to eval pulmonary htn-etiology of PHT- eval for out flow obstruction from ? LV mass on echo, r/o PE, ? RA  -check cta chest r/o pe -await echo r/o mass vs thrombus vs vegetation  -await echo to eval pulmonary htn-etiology of PHTN- eval for out flow obstruction from ? LV mass on echo, r/o PE, ? RA  -check cta chest r/o pe

## 2017-12-07 NOTE — PROGRESS NOTE ADULT - PROBLEM SELECTOR PLAN 2
No neurological deficits  CT scan of head: Small age indeterminate territorial infarct in the right high frontal   lobe. Mild chronic small vessel ischemic disease.  MRI and MRA pending  PT: home w/ home PT  As per Dr Milner: AMAN 12/7  neuro consulted - Do- mri/mra head & neck ordered -pt. may require valium prior to as she is "claustrophic"

## 2017-12-07 NOTE — CONSULT NOTE ADULT - SUBJECTIVE AND OBJECTIVE BOX
PULMONARY CONSULT    Initial HPI on admission:  HPI:  76 yo F with rheumatoid arthritis, asthma, pulmomary HTN, HLD who presented to [facility] with weakness and dyspnea. The patient endorses that she was in her usual state of health until a few weeks ago when she developed back and shoulder pain after moving furniture was treated with prednisone.  Her symptoms did not resolve and received treatment with prednisone.  She was persistently feeling unwell and presented to the ER where she received hydration and was treated for a UTI and released.   She became concerned as she is typically very active and spends her summer at a swim club and can typically ascend stairs without dyspnea.  She returned to the emergency room at [facility] and underewent a TTE revealed an echodensity noted at the base of the posterior wall of the LV and attached to LV septum of unclear etiology.    Transferred to [facility] for further management.      PAST MEDICAL & SURGICAL HISTORY:  Rheumatoid arthritis  Migraines  HLD (hyperlipidemia)  Hypothyroid  No significant past surgical history    Allergies    No Known Allergies    Intolerances      FAMILY HISTORY:  Family history of hypertension (Mother)    Social history:     Review of Systems:  CONSTITUTIONAL: No fever, chills, or fatigue  EYES: No eye pain, visual disturbances, or discharge  ENMT:  No difficulty hearing, tinnitus, vertigo; No sinus or throat pain  NECK: No pain or stiffness  RESPIRATORY: Per above  CARDIOVASCULAR: No chest pain, palpitations, dizziness, or leg swelling  GASTROINTESTINAL: No abdominal or epigastric pain. No nausea, vomiting, or hematemesis; No diarrhea or constipation. No melena or hematochezia.  GENITOURINARY: No dysuria, frequency, hematuria, or incontinence  NEUROLOGICAL: No headaches, memory loss, loss of strength, numbness, or tremors  SKIN: No itching, burning, rashes, or lesions   MUSCULOSKELETAL: No joint pain or swelling; No muscle, back, or extremity pain  PSYCHIATRIC: No depression, anxiety, mood swings, or difficulty sleeping      Medications:  MEDICATIONS  (STANDING):  aspirin 325 milliGRAM(s) Oral daily  atorvastatin 40 milliGRAM(s) Oral at bedtime  ertapenem  IVPB 1000 milliGRAM(s) IV Intermittent every 24 hours  heparin  Injectable 5000 Unit(s) SubCutaneous every 8 hours  levothyroxine 50 MICROGram(s) Oral daily  loratadine 10 milliGRAM(s) Oral daily  pantoprazole    Tablet 40 milliGRAM(s) Oral before breakfast    MEDICATIONS  (PRN):  acetaminophen   Tablet 650 milliGRAM(s) Oral every 6 hours PRN For Temp greater than 38.5 C (101.3 F)  acetaminophen   Tablet. 650 milliGRAM(s) Oral every 6 hours PRN Mild Pain (1 - 3)  ALBUTerol    90 MICROgram(s) HFA Inhaler 2 Puff(s) Inhalation every 6 hours PRN Shortness of Breath and/or Wheezing            Vital Signs Last 24 Hrs  T(C): 36.4 (07 Dec 2017 05:00), Max: 36.8 (06 Dec 2017 20:47)  T(F): 97.5 (07 Dec 2017 05:00), Max: 98.3 (06 Dec 2017 20:47)  HR: 100 (07 Dec 2017 05:00) (100 - 118)  BP: 123/72 (07 Dec 2017 05:00) (106/55 - 130/79)  BP(mean): --  RR: 18 (07 Dec 2017 05:00) (18 - 19)  SpO2: 100% (07 Dec 2017 05:00) (100% - 100%)            12-06 @ 07:01  -  12-07 @ 07:00  --------------------------------------------------------  IN: 0 mL / OUT: 600 mL / NET: -600 mL          LABS:                        11.0   20.3  )-----------( 494      ( 07 Dec 2017 05:25 )             34.9     12-07    137  |  105  |  17  ----------------------------<  176<H>  4.4   |  21<L>  |  0.94    Ca    8.5      07 Dec 2017 05:25            CAPILLARY BLOOD GLUCOSE        PT/INR - ( 07 Dec 2017 05:25 )   PT: 13.1 sec;   INR: 1.21 ratio                           CULTURES: (if applicable)  Culture Results:       Specimen Source: .Urine Clean Catch (Midstream)    Culture Results:   10,000 - 49,000 CFU/mL Escherichia coli ESBL    Organism Identification: Escherichia coli ESBL    Organism: Escherichia coli ESBL    Method Type: DOUG    Culture - Blood (12.04.17 @ 18:28)    Specimen Source: .Blood Blood    Culture Results:   No growth to date.    Culture - Blood (12.04.17 @ 18:27)    Specimen Source: .Blood Blood    Culture Results:   No growth to date.    Culture - Blood (11.29.17 @ 00:47)    Specimen Source: .Blood Blood-Peripheral    Culture Results:   No growth at 5 days.    Culture - Blood (11.29.17 @ 00:47)    Specimen Source: .Blood Blood-Peripheral    Culture Results:   No growth at 5 days.              Physical Examination:    General: No acute distress.      HEENT: Pupils equal, reactive to light.  Symmetric.    PULM: Clear to auscultation bilaterally, no significant sputum production    CVS: S1, S2    ABD: Soft, nondistended, nontender, normoactive bowel sounds, no masses    EXT: No edema, nontender    SKIN: Warm and well perfused, no rashes noted.    NEURO: Alert, oriented, interactive, nonfocal        RADIOLOGY REVIEWED  CXR:< from: Xray Chest 1 View AP/PA (12.04.17 @ 13:02) >    Impression: No evidence for pulmonary consolidation, pleural effusion or   pneumothorax.    The trachea is midline.    The cardiac silhouette is within normal limits.    Stable bilateral breast implants.    < end of copied text >    < from: CT Chest No Cont (12.04.17 @ 14:48) >    FINDINGS:    CHEST:     LUNGS AND LARGE AIRWAYS: Patent central airways.  Mild bilateral   interlobular septal thickening and groundglass opacity, consistent with   interstitial pulmonary edema. No pulmonary consolidation.  PLEURA: Trace bilateral pleural effusions.  VESSELS: Within normal limits.  HEART: Heart size is normal. No pericardial effusion.  MEDIASTINUM AND BIRD: No lymphadenopathy.  CHEST WALL AND LOWER NECK: Bilateral peripherally calcified breast   implants.  VISUALIZED UPPER ABDOMEN: Within normal limits.  BONES: Mild degenerative changes.    IMPRESSION: Mild interstitial pulmonary edema and trace bilateral pleural   effusions.    < end of copied text >    < from: MR Head No Cont (12.06.17 @ 15:07) >  IMPRESSION:  Numerous small acute infarcts bilaterally in the supratentorial and   infratentorial compartments; consider central embolic phenomenon.    < end of copied text >      < from: CT Renal Stone Hunt (12.04.17 @ 14:42) >    INTERPRETATION:  CT abdomen and pelvis, renal stone technique. No oral or   IV dye was given.    Bilateral breast implants with calcified periphery noted. Lower heart is   normal in size.    There is a slight left base pleural effusion.    There is an irregular infiltrate in the right mid lower lung field   anteriorly likely in the middle lobe. This is rather small.    In the abdomen the liver, spleen, gallbladder, and pancreas are   unremarkable given limitations of non-IV contrast technique.    No adrenal enlargements are evident.    The kidneys appear normal in size, shape, and axis. There are no renal   stones or hydronephrosis.    There is calcification in the wall of the aorta. There is no aneurysm.    No celiac or periaortic adenopathy is evident.    CT pelvis.    The uterus is enlarged with fibroids and fibroid calcification. Bladder   is grossly normal. No iliac or inguinal adenopathy is evident.    There is no bowel obstruction. There is no gross sense of mass or   infiltration relevant to the colon. The appendix is unremarkable. Bony   structures are unremarkable.    IMPRESSION: Slight left pleural reaction. Small irregular infiltrate in   the right middle lobe area.    Enlarged uterus and fibroids and fibroid calcification.    No acute abdominal finding. Incidental findings as above.    < end of copied text > PULMONARY CONSULT    Initial HPI on admission:  HPI:  76 yo F with rheumatoid arthritis, asthma, pulmomary HTN, HLD [facility]. The patient endorses that she was in her usual state of health until a few weeks ago when she developed back and shoulder pain after moving furniture was treated with prednisone.  Her symptoms did not resolve and received treatment with prednisone.  She was persistently feeling unwell and presented to the ER where she received hydration and was treated for a UTI and released.   She became concerned as she is typically very active and spends her summer at a swim club and can typically ascend stairs without dyspnea.  [facility] and underewent a TTE revealed an echodensity noted at the base of the posterior wall of the LV and attached to LV septum of unclear etiology.    Transferred to [facility] for further management. 12/7 s/p AMAN      PAST MEDICAL & SURGICAL HISTORY:  Rheumatoid arthritis  Migraines  HLD (hyperlipidemia)  Hypothyroid  No significant past surgical history    Allergies    No Known Allergies    Intolerances      FAMILY HISTORY:  Family history of hypertension (Mother)    Social history:     Review of Systems:  CONSTITUTIONAL: No fever, chills, or fatigue  EYES: No eye pain, visual disturbances, or discharge  ENMT:  No difficulty hearing, tinnitus, vertigo; No sinus or throat pain  NECK: No pain or stiffness  RESPIRATORY: Per above  CARDIOVASCULAR: No chest pain, palpitations, dizziness, or leg swelling  GASTROINTESTINAL: No abdominal or epigastric pain. No nausea, vomiting, or hematemesis; No diarrhea or constipation. No melena or hematochezia.  GENITOURINARY: No dysuria, frequency, hematuria, or incontinence  NEUROLOGICAL: No headaches, memory loss, loss of strength, numbness, or tremors  SKIN: No itching, burning, rashes, or lesions   MUSCULOSKELETAL: No joint pain or swelling; No muscle, back, or extremity pain  PSYCHIATRIC: No depression, anxiety, mood swings, or difficulty sleeping      Medications:  MEDICATIONS  (STANDING):  aspirin 325 milliGRAM(s) Oral daily  atorvastatin 40 milliGRAM(s) Oral at bedtime  ertapenem  IVPB 1000 milliGRAM(s) IV Intermittent every 24 hours  heparin  Injectable 5000 Unit(s) SubCutaneous every 8 hours  levothyroxine 50 MICROGram(s) Oral daily  loratadine 10 milliGRAM(s) Oral daily  pantoprazole    Tablet 40 milliGRAM(s) Oral before breakfast    MEDICATIONS  (PRN):  acetaminophen   Tablet 650 milliGRAM(s) Oral every 6 hours PRN For Temp greater than 38.5 C (101.3 F)  acetaminophen   Tablet. 650 milliGRAM(s) Oral every 6 hours PRN Mild Pain (1 - 3)  ALBUTerol    90 MICROgram(s) HFA Inhaler 2 Puff(s) Inhalation every 6 hours PRN Shortness of Breath and/or Wheezing            Vital Signs Last 24 Hrs  T(C): 36.4 (07 Dec 2017 05:00), Max: 36.8 (06 Dec 2017 20:47)  T(F): 97.5 (07 Dec 2017 05:00), Max: 98.3 (06 Dec 2017 20:47)  HR: 100 (07 Dec 2017 05:00) (100 - 118)  BP: 123/72 (07 Dec 2017 05:00) (106/55 - 130/79)  BP(mean): --  RR: 18 (07 Dec 2017 05:00) (18 - 19)  SpO2: 100% (07 Dec 2017 05:00) (100% - 100%)            12-06 @ 07:01  -  12-07 @ 07:00  --------------------------------------------------------  IN: 0 mL / OUT: 600 mL / NET: -600 mL          LABS:                        11.0   20.3  )-----------( 494      ( 07 Dec 2017 05:25 )             34.9     12-07    137  |  105  |  17  ----------------------------<  176<H>  4.4   |  21<L>  |  0.94    Ca    8.5      07 Dec 2017 05:25            CAPILLARY BLOOD GLUCOSE        PT/INR - ( 07 Dec 2017 05:25 )   PT: 13.1 sec;   INR: 1.21 ratio                           CULTURES: (if applicable)  Culture Results:       Specimen Source: .Urine Clean Catch (Midstream)    Culture Results:   10,000 - 49,000 CFU/mL Escherichia coli ESBL    Organism Identification: Escherichia coli ESBL    Organism: Escherichia coli ESBL    Method Type: DOUG    Culture - Blood (12.04.17 @ 18:28)    Specimen Source: .Blood Blood    Culture Results:   No growth to date.    Culture - Blood (12.04.17 @ 18:27)    Specimen Source: .Blood Blood    Culture Results:   No growth to date.    Culture - Blood (11.29.17 @ 00:47)    Specimen Source: .Blood Blood-Peripheral    Culture Results:   No growth at 5 days.    Culture - Blood (11.29.17 @ 00:47)    Specimen Source: .Blood Blood-Peripheral    Culture Results:   No growth at 5 days.              Physical Examination:    General: No acute distress.      HEENT: Pupils equal, reactive to light.  Symmetric.    PULM: Clear to auscultation bilaterally, no significant sputum production    CVS: S1, S2    ABD: Soft, nondistended, nontender, normoactive bowel sounds, no masses    EXT: No edema, nontender    SKIN: Warm and well perfused, no rashes noted.    NEURO: Alert, oriented, interactive, nonfocal        RADIOLOGY REVIEWED  CXR:< from: Xray Chest 1 View AP/PA (12.04.17 @ 13:02) >    Impression: No evidence for pulmonary consolidation, pleural effusion or   pneumothorax.    The trachea is midline.    The cardiac silhouette is within normal limits.    Stable bilateral breast implants.    < end of copied text >    < from: CT Chest No Cont (12.04.17 @ 14:48) >    FINDINGS:    CHEST:     LUNGS AND LARGE AIRWAYS: Patent central airways.  Mild bilateral   interlobular septal thickening and groundglass opacity, consistent with   interstitial pulmonary edema. No pulmonary consolidation.  PLEURA: Trace bilateral pleural effusions.  VESSELS: Within normal limits.  HEART: Heart size is normal. No pericardial effusion.  MEDIASTINUM AND BIRD: No lymphadenopathy.  CHEST WALL AND LOWER NECK: Bilateral peripherally calcified breast   implants.  VISUALIZED UPPER ABDOMEN: Within normal limits.  BONES: Mild degenerative changes.    IMPRESSION: Mild interstitial pulmonary edema and trace bilateral pleural   effusions.    < end of copied text >    < from: MR Head No Cont (12.06.17 @ 15:07) >  IMPRESSION:  Numerous small acute infarcts bilaterally in the supratentorial and   infratentorial compartments; consider central embolic phenomenon.    < end of copied text >      < from: CT Renal Stone Hunt (12.04.17 @ 14:42) >    INTERPRETATION:  CT abdomen and pelvis, renal stone technique. No oral or   IV dye was given.    Bilateral breast implants with calcified periphery noted. Lower heart is   normal in size.    There is a slight left base pleural effusion.    There is an irregular infiltrate in the right mid lower lung field   anteriorly likely in the middle lobe. This is rather small.    In the abdomen the liver, spleen, gallbladder, and pancreas are   unremarkable given limitations of non-IV contrast technique.    No adrenal enlargements are evident.    The kidneys appear normal in size, shape, and axis. There are no renal   stones or hydronephrosis.    There is calcification in the wall of the aorta. There is no aneurysm.    No celiac or periaortic adenopathy is evident.    CT pelvis.    The uterus is enlarged with fibroids and fibroid calcification. Bladder   is grossly normal. No iliac or inguinal adenopathy is evident.    There is no bowel obstruction. There is no gross sense of mass or   infiltration relevant to the colon. The appendix is unremarkable. Bony   structures are unremarkable.    IMPRESSION: Slight left pleural reaction. Small irregular infiltrate in   the right middle lobe area.    Enlarged uterus and fibroids and fibroid calcification.    No acute abdominal finding. Incidental findings as above.    < end of copied text >    < from: Transthoracic Echocardiogram (12.05.17 @ 07:27) >  ------------------------------------------------------------------------  CONCLUSIONS:  1. Opening of the mitral valve appears obtructed by the  echodensity noted in the LV. Mild mitral regurgitation.  2. Aortic valve not well visualized.  3. Aortic Root: 2.6 cm.  4. Normal left atrium.  5. Normal left ventricular internal dimensions and wall  thicknesses.  6. Normal Left Ventricular Systolic Function,  (EF = 55 to  60%)  Echodensity noted at the base of the posterior wall  of the LV of unclear etiology 2.0 x 1.5 cm.  Echo density  noted attached to the LV septum 1.2 x 0.75 cm unclear  etiology  7. Grade II diastolic dysfunction.  8. Right atrium not well visualized.  9. Right ventricle not well visualized.  10. RA Pressure is 10 mm Hg.  11. RV systolic pressure is 65 mm Hg. Severe pulmonary  hypertension.  12. Normal pericardium with no pericardial effusion.    < end of copied text > PULMONARY CONSULT    Initial HPI on admission:  HPI:  78 yo F with rheumatoid arthritis, asthma, pulmomary HTN, HLD who presented to [facility] with weakness and dyspnea. The patient endorses that she was in her usual state of health until a few weeks ago when she developed back and shoulder pain after moving furniture was treated with prednisone.  Her symptoms did not resolve and received treatment with prednisone. Pt states that she persistently  became weak with inability to walk -after receiving steroids prompting admission to ER.  Presented to the ER where she received hydration and was treated for a UTI and released.   She became concerned as she is typically very active and spends her summer at a swim club and can typically ascend stairs without dyspnea.  She returned to the emergency room at [facility] and underewent a TTE revealed an echodensity noted at the base of the posterior wall of the LV and attached to LV septum of unclear etiology.    Transferred to [facility] for further management. 12/7 s/p AMAN. Called to eval + dyspnea, + cough, -cp, -pleuritc cp, - weight loss, -fever, -n/v/d, pt reports hx of lung nodules d/t MTX  for TX of RA-off MTX about 1 year. Takes herbal meds for PA, tumeric and bioflex.  hx of asthma-uses encurse, good functional status before recent decline  o/w ros negative      PAST MEDICAL & SURGICAL HISTORY:  Rheumatoid arthritis-on herbal supplements, tumeric, bioreflex  Migraines  HLD (hyperlipidemia)  Hypothyroid  No significant past surgical history    Allergies: No Known Allergies        FAMILY HISTORY:  Family history of hypertension (Mother)    Social history: smoked x 10yrs <1ppd    Review of Systems:  CONSTITUTIONAL: No fever, chills, or + fatigue, + weakness  EYES: No eye pain, visual disturbances, or discharge  ENMT:  No difficulty hearing, tinnitus, vertigo; No sinus or throat pain  NECK: No pain or stiffness  RESPIRATORY: Per above  CARDIOVASCULAR: No chest pain, palpitations, dizziness, or leg swelling  GASTROINTESTINAL: No abdominal or epigastric pain. No nausea, vomiting, or hematemesis; No diarrhea or constipation. No melena or hematochezia., + dry mouth  GENITOURINARY: No dysuria, frequency, hematuria, or incontinence  NEUROLOGICAL: No headaches, memory loss, loss of strength, numbness, or tremors  SKIN: No itching, burning, rashes, or lesions   MUSCULOSKELETAL: No joint pain or swelling; No muscle, back, or extremity pain  PSYCHIATRIC: No depression, anxiety, mood swings, or difficulty sleeping      Medications:  MEDICATIONS  (STANDING):  aspirin 325 milliGRAM(s) Oral daily  atorvastatin 40 milliGRAM(s) Oral at bedtime  ertapenem  IVPB 1000 milliGRAM(s) IV Intermittent every 24 hours  heparin  Injectable 5000 Unit(s) SubCutaneous every 8 hours  levothyroxine 50 MICROGram(s) Oral daily  loratadine 10 milliGRAM(s) Oral daily  pantoprazole    Tablet 40 milliGRAM(s) Oral before breakfast    MEDICATIONS  (PRN):  acetaminophen   Tablet 650 milliGRAM(s) Oral every 6 hours PRN For Temp greater than 38.5 C (101.3 F)  acetaminophen   Tablet. 650 milliGRAM(s) Oral every 6 hours PRN Mild Pain (1 - 3)  ALBUTerol    90 MICROgram(s) HFA Inhaler 2 Puff(s) Inhalation every 6 hours PRN Shortness of Breath and/or Wheezing            Vital Signs Last 24 Hrs  T(C): 36.4 (07 Dec 2017 05:00), Max: 36.8 (06 Dec 2017 20:47)  T(F): 97.5 (07 Dec 2017 05:00), Max: 98.3 (06 Dec 2017 20:47)  HR: 100 (07 Dec 2017 05:00) (100 - 118)  BP: 123/72 (07 Dec 2017 05:00) (106/55 - 130/79)  BP(mean): --  RR: 18 (07 Dec 2017 05:00) (18 - 19)  SpO2: 100% (07 Dec 2017 05:00) (100% - 100%)            12-06 @ 07:01  -  12-07 @ 07:00  --------------------------------------------------------  IN: 0 mL / OUT: 600 mL / NET: -600 mL          LABS:                        11.0   20.3  )-----------( 494      ( 07 Dec 2017 05:25 )             34.9     12-07    137  |  105  |  17  ----------------------------<  176<H>  4.4   |  21<L>  |  0.94    Ca    8.5      07 Dec 2017 05:25            CAPILLARY BLOOD GLUCOSE        PT/INR - ( 07 Dec 2017 05:25 )   PT: 13.1 sec;   INR: 1.21 ratio                           CULTURES: (if applicable)  Culture Results:       Specimen Source: .Urine Clean Catch (Midstream)    Culture Results:   10,000 - 49,000 CFU/mL Escherichia coli ESBL    Organism Identification: Escherichia coli ESBL    Organism: Escherichia coli ESBL    Method Type: DOUG    Culture - Blood (12.04.17 @ 18:28)    Specimen Source: .Blood Blood    Culture Results:   No growth to date.    Culture - Blood (12.04.17 @ 18:27)    Specimen Source: .Blood Blood    Culture Results:   No growth to date.    Culture - Blood (11.29.17 @ 00:47)    Specimen Source: .Blood Blood-Peripheral    Culture Results:   No growth at 5 days.    Culture - Blood (11.29.17 @ 00:47)    Specimen Source: .Blood Blood-Peripheral    Culture Results:   No growth at 5 days.              Physical Examination:  a/o x3, o2 sat 95% on room air  General: No acute distress.      HEENT: Pupils equal, reactive to light.  Symmetric.- oral lesion    PULM: diffuse exp wheeze- bilaterally, no significant sputum production    CVS: S1, S2 rrr    ABD: Soft, nondistended, nontender, normoactive bowel sounds, no masses    EXT: No edema, nontender    SKIN: Warm and well perfused, no rashes noted.    NEURO: Alert, oriented, interactive, nonfocal        RADIOLOGY REVIEWED  CXR:< from: Xray Chest 1 View AP/PA (12.04.17 @ 13:02) >    Impression: No evidence for pulmonary consolidation, pleural effusion or   pneumothorax.    The trachea is midline.    The cardiac silhouette is within normal limits.    Stable bilateral breast implants.    < end of copied text >    < from: CT Chest No Cont (12.04.17 @ 14:48) >    FINDINGS:    CHEST:     LUNGS AND LARGE AIRWAYS: Patent central airways.  Mild bilateral   interlobular septal thickening and groundglass opacity, consistent with   interstitial pulmonary edema. No pulmonary consolidation.  PLEURA: Trace bilateral pleural effusions.  VESSELS: Within normal limits.  HEART: Heart size is normal. No pericardial effusion.  MEDIASTINUM AND BIRD: No lymphadenopathy.  CHEST WALL AND LOWER NECK: Bilateral peripherally calcified breast   implants.  VISUALIZED UPPER ABDOMEN: Within normal limits.  BONES: Mild degenerative changes.    IMPRESSION: Mild interstitial pulmonary edema and trace bilateral pleural   effusions.    < end of copied text >    < from: MR Head No Cont (12.06.17 @ 15:07) >  IMPRESSION:  Numerous small acute infarcts bilaterally in the supratentorial and   infratentorial compartments; consider central embolic phenomenon.    < end of copied text >      < from: CT Renal Stone Hunt (12.04.17 @ 14:42) >    INTERPRETATION:  CT abdomen and pelvis, renal stone technique. No oral or   IV dye was given.    Bilateral breast implants with calcified periphery noted. Lower heart is   normal in size.    There is a slight left base pleural effusion.    There is an irregular infiltrate in the right mid lower lung field   anteriorly likely in the middle lobe. This is rather small.    In the abdomen the liver, spleen, gallbladder, and pancreas are   unremarkable given limitations of non-IV contrast technique.    No adrenal enlargements are evident.    The kidneys appear normal in size, shape, and axis. There are no renal   stones or hydronephrosis.    There is calcification in the wall of the aorta. There is no aneurysm.    No celiac or periaortic adenopathy is evident.    CT pelvis.    The uterus is enlarged with fibroids and fibroid calcification. Bladder   is grossly normal. No iliac or inguinal adenopathy is evident.    There is no bowel obstruction. There is no gross sense of mass or   infiltration relevant to the colon. The appendix is unremarkable. Bony   structures are unremarkable.    IMPRESSION: Slight left pleural reaction. Small irregular infiltrate in   the right middle lobe area.    Enlarged uterus and fibroids and fibroid calcification.    No acute abdominal finding. Incidental findings as above.    < end of copied text >    < from: Transthoracic Echocardiogram (12.05.17 @ 07:27) >  ------------------------------------------------------------------------  CONCLUSIONS:  1. Opening of the mitral valve appears obtructed by the  echodensity noted in the LV. Mild mitral regurgitation.  2. Aortic valve not well visualized.  3. Aortic Root: 2.6 cm.  4. Normal left atrium.  5. Normal left ventricular internal dimensions and wall  thicknesses.  6. Normal Left Ventricular Systolic Function,  (EF = 55 to  60%)  Echodensity noted at the base of the posterior wall  of the LV of unclear etiology 2.0 x 1.5 cm.  Echo density  noted attached to the LV septum 1.2 x 0.75 cm unclear  etiology  7. Grade II diastolic dysfunction.  8. Right atrium not well visualized.  9. Right ventricle not well visualized.  10. RA Pressure is 10 mm Hg.  11. RV systolic pressure is 65 mm Hg. Severe pulmonary  hypertension.  12. Normal pericardium with no pericardial effusion.    < end of copied text > PULMONARY CONSULT    Initial HPI on admission:  HPI:  76 yo F with rheumatoid arthritis, asthma, pulmomary HTN, HLD who presented to [facility] with weakness and dyspnea. The patient endorses that she was in her usual state of health until a few weeks ago when she developed back and shoulder pain after moving furniture was treated with prednisone.  Her symptoms did not resolve and received treatment with prednisone. Pt states that she persistently  became weak with inability to walk -after receiving steroids prompting admission to ER.  Presented to the ER where she received hydration and was treated for a UTI and released.   She became concerned as she is typically very active and spends her summer at a swim club and can typically ascend stairs without dyspnea.  She returned to the emergency room at [facility] and underewent a TTE revealed an echodensity noted at the base of the posterior wall of the LV and attached to LV septum of unclear etiology.    Transferred to [facility] for further management. 12/7 s/p AMAN. Called to eval + dyspnea, + cough, -cp, -pleuritc cp, - weight loss, -fever, -n/v/d, pt reports hx of lung nodules d/t MTX  for TX of RA-off MTX about 1 year. Takes herbal meds for PA, tumeric and bioflex.  hx of asthma-uses encurse, good functional status before recent decline  o/w ros negative      PAST MEDICAL & SURGICAL HISTORY:  Rheumatoid arthritis-on herbal supplements, tumeric, bioreflex  Migraines  HLD (hyperlipidemia)  Hypothyroid  No significant past surgical history    Allergies: No Known Allergies        FAMILY HISTORY:  Family history of hypertension (Mother)    Social history: smoked x 10yrs <1ppd    Review of Systems:  CONSTITUTIONAL: No fever, chills, or + fatigue, + weakness  EYES: No eye pain, visual disturbances, or discharge  ENMT:  No difficulty hearing, tinnitus, vertigo; No sinus or throat pain  NECK: No pain or stiffness  RESPIRATORY: Per above  CARDIOVASCULAR: No chest pain, palpitations, dizziness, or leg swelling  GASTROINTESTINAL: No abdominal or epigastric pain. No nausea, vomiting, or hematemesis; No diarrhea or constipation. No melena or hematochezia., + dry mouth  GENITOURINARY: No dysuria, frequency, hematuria, or incontinence  NEUROLOGICAL: No headaches, memory loss, loss of strength, numbness, or tremors  SKIN: No itching, burning, rashes, or lesions   MUSCULOSKELETAL: No joint pain or swelling; No muscle, back, or extremity pain  PSYCHIATRIC: No depression, anxiety, mood swings, or difficulty sleeping      Medications:  MEDICATIONS  (STANDING):  aspirin 325 milliGRAM(s) Oral daily  atorvastatin 40 milliGRAM(s) Oral at bedtime  ertapenem  IVPB 1000 milliGRAM(s) IV Intermittent every 24 hours  heparin  Injectable 5000 Unit(s) SubCutaneous every 8 hours  levothyroxine 50 MICROGram(s) Oral daily  loratadine 10 milliGRAM(s) Oral daily  pantoprazole    Tablet 40 milliGRAM(s) Oral before breakfast    MEDICATIONS  (PRN):  acetaminophen   Tablet 650 milliGRAM(s) Oral every 6 hours PRN For Temp greater than 38.5 C (101.3 F)  acetaminophen   Tablet. 650 milliGRAM(s) Oral every 6 hours PRN Mild Pain (1 - 3)  ALBUTerol    90 MICROgram(s) HFA Inhaler 2 Puff(s) Inhalation every 6 hours PRN Shortness of Breath and/or Wheezing    Vital Signs Last 24 Hrs  T(C): 36.4 (07 Dec 2017 05:00), Max: 36.8 (06 Dec 2017 20:47)  T(F): 97.5 (07 Dec 2017 05:00), Max: 98.3 (06 Dec 2017 20:47)  HR: 100 (07 Dec 2017 05:00) (100 - 118)  BP: 123/72 (07 Dec 2017 05:00) (106/55 - 130/79)  BP(mean): --  RR: 18 (07 Dec 2017 05:00) (18 - 19)  SpO2: 100% (07 Dec 2017 05:00) (100% - 100%)    12-06 @ 07:01  -  12-07 @ 07:00  --------------------------------------------------------  IN: 0 mL / OUT: 600 mL / NET: -600 mL    LABS:                        11.0   20.3  )-----------( 494      ( 07 Dec 2017 05:25 )             34.9     12-07    137  |  105  |  17  ----------------------------<  176<H>  4.4   |  21<L>  |  0.94    Ca    8.5      07 Dec 2017 05:25    CAPILLARY BLOOD GLUCOSE        PT/INR - ( 07 Dec 2017 05:25 )   PT: 13.1 sec;   INR: 1.21 ratio      CULTURES: (if applicable)  Culture Results:       Specimen Source: .Urine Clean Catch (Midstream)    Culture Results:   10,000 - 49,000 CFU/mL Escherichia coli ESBL    Organism Identification: Escherichia coli ESBL    Organism: Escherichia coli ESBL    Method Type: DOUG    Culture - Blood (12.04.17 @ 18:28)    Specimen Source: .Blood Blood    Culture Results:   No growth to date.    Culture - Blood (12.04.17 @ 18:27)    Specimen Source: .Blood Blood    Culture Results:   No growth to date.    Culture - Blood (11.29.17 @ 00:47)    Specimen Source: .Blood Blood-Peripheral    Culture Results:   No growth at 5 days.    Culture - Blood (11.29.17 @ 00:47)    Specimen Source: .Blood Blood-Peripheral    Culture Results:   No growth at 5 days.    Physical Examination:  a/o x3, o2 sat 95% on room air  General: No acute distress.      HEENT: Pupils equal, reactive to light.  Symmetric.- oral lesion    PULM: diffuse exp wheeze- bilaterally, no significant sputum production    CVS: S1, S2 rrr    ABD: Soft, nondistended, nontender, normoactive bowel sounds, no masses    EXT: No edema, nontender    SKIN: Warm and well perfused, no rashes noted.    NEURO: Alert, oriented, interactive, nonfocal        RADIOLOGY REVIEWED  CXR:< from: Xray Chest 1 View AP/PA (12.04.17 @ 13:02) >    Impression: No evidence for pulmonary consolidation, pleural effusion or   pneumothorax.    The trachea is midline.    The cardiac silhouette is within normal limits.    Stable bilateral breast implants.    < end of copied text >    < from: CT Chest No Cont (12.04.17 @ 14:48) >    FINDINGS:    CHEST:     LUNGS AND LARGE AIRWAYS: Patent central airways.  Mild bilateral   interlobular septal thickening and groundglass opacity, consistent with   interstitial pulmonary edema. No pulmonary consolidation.  PLEURA: Trace bilateral pleural effusions.  VESSELS: Within normal limits.  HEART: Heart size is normal. No pericardial effusion.  MEDIASTINUM AND BIRD: No lymphadenopathy.  CHEST WALL AND LOWER NECK: Bilateral peripherally calcified breast   implants.  VISUALIZED UPPER ABDOMEN: Within normal limits.  BONES: Mild degenerative changes.    IMPRESSION: Mild interstitial pulmonary edema and trace bilateral pleural   effusions.    < end of copied text >    < from: MR Head No Cont (12.06.17 @ 15:07) >  IMPRESSION:  Numerous small acute infarcts bilaterally in the supratentorial and   infratentorial compartments; consider central embolic phenomenon.    < end of copied text >      < from: CT Renal Stone Hunt (12.04.17 @ 14:42) >    INTERPRETATION:  CT abdomen and pelvis, renal stone technique. No oral or   IV dye was given.    Bilateral breast implants with calcified periphery noted. Lower heart is   normal in size.    There is a slight left base pleural effusion.    There is an irregular infiltrate in the right mid lower lung field   anteriorly likely in the middle lobe. This is rather small.    In the abdomen the liver, spleen, gallbladder, and pancreas are   unremarkable given limitations of non-IV contrast technique.    No adrenal enlargements are evident.    The kidneys appear normal in size, shape, and axis. There are no renal   stones or hydronephrosis.    There is calcification in the wall of the aorta. There is no aneurysm.    No celiac or periaortic adenopathy is evident.    CT pelvis.    The uterus is enlarged with fibroids and fibroid calcification. Bladder   is grossly normal. No iliac or inguinal adenopathy is evident.    There is no bowel obstruction. There is no gross sense of mass or   infiltration relevant to the colon. The appendix is unremarkable. Bony   structures are unremarkable.    IMPRESSION: Slight left pleural reaction. Small irregular infiltrate in   the right middle lobe area.    Enlarged uterus and fibroids and fibroid calcification.    No acute abdominal finding. Incidental findings as above.    < end of copied text >    < from: Transthoracic Echocardiogram (12.05.17 @ 07:27) >  ------------------------------------------------------------------------  CONCLUSIONS:  1. Opening of the mitral valve appears obtructed by the  echodensity noted in the LV. Mild mitral regurgitation.  2. Aortic valve not well visualized.  3. Aortic Root: 2.6 cm.  4. Normal left atrium.  5. Normal left ventricular internal dimensions and wall  thicknesses.  6. Normal Left Ventricular Systolic Function,  (EF = 55 to  60%)  Echodensity noted at the base of the posterior wall  of the LV of unclear etiology 2.0 x 1.5 cm.  Echo density  noted attached to the LV septum 1.2 x 0.75 cm unclear  etiology  7. Grade II diastolic dysfunction.  8. Right atrium not well visualized.  9. Right ventricle not well visualized.  10. RA Pressure is 10 mm Hg.  11. RV systolic pressure is 65 mm Hg. Severe pulmonary  hypertension.  12. Normal pericardium with no pericardial effusion.    < end of copied text >

## 2017-12-07 NOTE — CHART NOTE - NSCHARTNOTEFT_GEN_A_CORE
I discussed the case yesterday with neurologist Dr. Gotti, who recommended to transfer the patient to [place]   for fear of a major stroke overnight. AMAN was scheduled in am but due to lack of any definite information for the mass in the heart:  vegetation vs tumor vs thrombus, hence unclear benefits vs risks of anticoagulation at this point.  I discussed later with the patient herself, her daughter, I explained and answered all questions, they understand and agreed with the plan.   Patient asked me to call her PCP Dr. Degroot, who I called in the night and discussed with her and she is agreeable with our plan for the best benefit of the patient.

## 2017-12-07 NOTE — CONSULT NOTE ADULT - ASSESSMENT
77 yr old with RA  presents with multiple tracey cva possible embolic and an abnormal tte with mass v. clot. veg .   No fever  recent blood cultures negative but urine culture positive for esbl ecoli.   currently on Invanz for urine culture.      await anaya   clinically it appears unlikely that this is endocarditis but not totally clear yet

## 2017-12-07 NOTE — CONSULT NOTE ADULT - SUBJECTIVE AND OBJECTIVE BOX
Patient is a 77y old  Female who presents with a chief complaint of transfer for further management (06 Dec 2017 23:42)    HPI:  76 yo F with rheumatoid arthritis, asthma, pulmomary HTN, HLD who presented to [facility] with weakness and dyspnea. The patient endorses that she was in her usual state of health until a few weeks ago when she developed back and shoulder pain after moving furniture was treated with prednisone.  Her symptoms did not resolve and received treatment with prednisone.  She was persistently feeling unwell and presented to the ER where she received hydration and was treated for a UTI and released.   She became concerned as she is typically very active and spends her summer at a swim club and can typically ascend stairs without dyspnea.  She returned to the emergency room at [facility] and underewent a TTE revealed an echodensity noted at the base of the posterior wall of the LV and attached to LV septum of unclear etiology.    Transferred to [facility] for further management.  Consults at [facility]:   denies hs of fever but has been weak  Pulmonary  ID  Cardiology (06 Dec 2017 23:42)      PAST MEDICAL & SURGICAL HISTORY:  Rheumatoid arthritis  Migraines  HLD (hyperlipidemia)  Hypothyroid  No significant past surgical history      Social history:    FAMILY HISTORY:  Family history of hypertension (Mother)    REVIEW OF SYSTEMS  General:	 positve  malaise fatigueno chills. Fevers absent    Skin:No rash  	  Ophthalmologic:Denies any visual complaints,discharge redness or photophobia  	  ENMT:No nasal discharge,headache,sinus congestion or throat pain.No dental complaints    Respiratory and Thorax:No cough,sputum or chest pain.Denies shortness of breath  	  Cardiovascular:	  chest pain,palpitaions or dizziness    Gastrointestinal:	NO nausea,abdominal pain or diarrhea.    Genitourinary:	No dysuria,frequency. No flank pain    Musculoskeletal:	No joint swelling or pain.No weakness    Neurological:No confusion,diziness.No extremity weakness.No bladder or bowel incontinence	    Psychiatric:No delusions or hallucinations	    Hematology/Lymphatics:	No LN swelling.No gum bleeding     Endocrine:	No recent weight gain or loss.No abnormal heat/cold intolerance    Allergic/Immunologic:	No hives or rash   Allergies    No Known Allergies    Intolerances        Antimicrobials:    ertapenem  IVPB 1000 milliGRAM(s) IV Intermittent every 24 hours        Vital Signs Last 24 Hrs  T(C): 36.4 (07 Dec 2017 05:00), Max: 36.8 (06 Dec 2017 20:47)  T(F): 97.5 (07 Dec 2017 05:00), Max: 98.3 (06 Dec 2017 20:47)  HR: 100 (07 Dec 2017 05:00) (100 - 118)  BP: 123/72 (07 Dec 2017 05:00) (106/55 - 130/79)  BP(mean): --  RR: 18 (07 Dec 2017 05:00) (18 - 19)  SpO2: 100% (07 Dec 2017 05:00) (100% - 100%)    PHYSICAL EXAM:Pleasant patient in no acute distress.      Constitutional:Comfortable.Awake and alert  No cachexia     Eyes:PERRL EOMI.NO discharge or conjunctival injection    ENMT:No sinus tenderness.No thrush.No pharyngeal exudate or erythema.Fair dental hygiene    Neck:Supple,No LN,no JVD      Respiratory:Good air entry bilaterally,CTA    Cardiovascular:S1 S2 wnl, No murmurs,rub or gallops    Gastrointestinal:Soft BS(+) no tenderness no masses ,No rebound or guarding    Genitourinary:No CVA tendereness     Rectal:    Extremities:No cyanosis,clubbing or edema.    Vascular:peripheral pulses felt    Neurological:AAO X 3,No grossly focal deficits    Skin:No rash     Lymph Nodes:No palpable LNs    Musculoskeletal:No joint swelling or LOM     .                                11.0   20.3  )-----------( 494      ( 07 Dec 2017 05:25 )             34.9         12-07    137  |  105  |  17  ----------------------------<  176<H>  4.4   |  21<L>  |  0.94    Ca    8.5      07 Dec 2017 05:25        RECENT CULTURES:  12-04 @ 23:14  .Urine Clean Catch (Midstream)  Escherichia coli ESBL  Escherichia coli ESBL  DOUG    10,000 - 49,000 CFU/mL Escherichia coli ESBL  --  12-04 @ 18:28  .Blood Blood  --  --  --    No growth to date.  --  12-04 @ 18:27  .Blood Blood  --  --  --    No growth to date.  --      MICROBIOLOGY:  Culture Results:   10,000 - 49,000 CFU/mL Escherichia coli ESBL (12-04 @ 23:14)  Culture Results:   No growth to date. (12-04 @ 18:28)  Culture Results:   No growth to date. (12-04 @ 18:27)          Radiology:      Assessment:        Recommendations and Plan:    Pager [phone]  After 5 pm/weekends or if no response :[phone]

## 2017-12-07 NOTE — CONSULT NOTE ADULT - ASSESSMENT
78 yo F with rheumatoid arthritis, asthma, pulmomary HTN, HLD who presented to [facility] with weakness and dyspnea. Recently treated with prednisone - with no improvement  in her dyspnea. Aslo received ivf for dehydration and treatment for uti.  She returned to the emergency room at [facility] and underewent a TTE revealed an echodensity noted at the base of the posterior wall of the LV and attached to LV septum of unclear etiology- mass vs thrombus vs vegetation.   12/6 Transferred to [facility] for further management. 12/6  MRI brain reveals numerous small acute bilateral infarcts. 12/7 s/p TTE. Called to eval for asthma and abnormal ct chest. 78 yo F with rheumatoid arthritis, asthma, pulmomary HTN, HLD who presented to [facility] with weakness and dyspnea. Recently treated with prednisone/for R shoulder pain but - with no improvement  in her dyspnea. Aslo received ivf for dehydration and treatment for uti.  She returned to the emergency room at [facility] and underewent a TTE revealed an echodensity noted at the base of the posterior wall of the LV and attached to LV septum of unclear etiology- mass vs thrombus vs vegetation.   12/6 Transferred to [facility] for further management. 12/6  MRI brain reveals numerous small acute bilateral infarcts. 12/7 s/p AMAN. Called to eval for asthma and abnormal ct chest. 78 yo F with rheumatoid arthritis, asthma, pulmomary HTN, HLD who presented to [facility] with weakness and dyspnea. Recently treated with prednisone/for R shoulder pain but - with no improvement  in her dyspnea, with progression of fatigue and weakness.  Aslo received ivf for dehydration and treatment for uti.  She returned to the emergency room at [facility] and underewent a TTE revealed an echodensity noted at the base of the posterior wall of the LV and attached to LV septum of unclear etiology- mass vs thrombus vs vegetation.   12/6 Transferred to [facility] for further management. 12/6  MRI brain reveals numerous small acute bilateral infarcts. 12/7 s/p AMAN. Called to eval for asthma and abnormal ct chest.

## 2017-12-07 NOTE — PROGRESS NOTE ADULT - SUBJECTIVE AND OBJECTIVE BOX
VITAL SIGNS-Tele:   Vital Signs Last 24 Hrs  T(F): 97.7 (17 @ 15:13), Max: 98.3 (17 @ 20:47)  BP: 127/79 (17 @ 15:13) (111/86 - 130/79)  SpO2: 98% (17 @ 15:13) (98% - 100%)          @ 07:01  -   @ 07:00  --------------------------------------------------------  IN: 0 mL / OUT: 600 mL / NET: -600 mL  Daily Height in cm: 154.94 (06 Dec 2017 23:15)    Daily Weight in k.1 (07 Dec 2017 08:00)         PHYSICAL EXAM:  Neurology: alert and oriented x 3, nonfocal, no gross deficits  CV : S1S2  Sternal Wound :  CDI , Stable  Lungs: Coarse BS  Abdomen: soft, nontender, nondistended, positive bowel sounds, last bowel movement     Extremities:     no edema no calf tenderness    acetaminophen   Tablet 650 milliGRAM(s) Oral every 6 hours PRN  acetaminophen   Tablet. 650 milliGRAM(s) Oral every 6 hours PRN  ALBUTerol    90 MICROgram(s) HFA Inhaler 2 Puff(s) Inhalation every 6 hours PRN  aspirin 325 milliGRAM(s) Oral daily  atorvastatin 40 milliGRAM(s) Oral at bedtime  ertapenem  IVPB 1000 milliGRAM(s) IV Intermittent every 24 hours  heparin  Injectable 5000 Unit(s) SubCutaneous every 8 hours  levothyroxine 50 MICROGram(s) Oral daily  loratadine 10 milliGRAM(s) Oral daily  pantoprazole    Tablet 40 milliGRAM(s) Oral before breakfast      Physical Therapy Rec:   Home  [  ]   Home w/ PT  [  ]  Rehab  [  ]  Discussed with Cardiothoracic Team at AM rounds.

## 2017-12-07 NOTE — CONSULT NOTE ADULT - ASSESSMENT
77 F with cardiac mass  ·	Plan for MRI today for tissue characterization  ·	Based on results of MRI will need to determine need for A/C given recurrent embolic events.

## 2017-12-07 NOTE — CONSULT NOTE ADULT - SUBJECTIVE AND OBJECTIVE BOX
Chief Complaint: Cardiac mass    HPI: 77 F transferred from outside hospital after possible mass was noted in echo. Patient has been having persistent MONTGOMERY and after hospitalization she had echo that had finding suspicious in the LV for mass vs thrombus. Patient transferred to [facility] for further eval. AMAN was noted to show same lesion but unable to characterize. MRI showed multiple infarcts suggestive of embolic phenomenon. Currently patient denies specific complaints. No chest pain. No shortness of breath. Patient also had recent steroid use for rheum issue with what is described as a reasonable taper. She was felt to have adrenal insufficiency at OSH.     PMH:   Rheumatoid arthritis  Migraines  HLD (hyperlipidemia)  Hypothyroid    PSH:   No significant past surgical history    Family History:  FAMILY HISTORY:  Family history of hypertension (Mother)    Allergies:  No Known Allergies    Social History:  Smoking: No active.   Alcohol:  Drugs:    Medications:  acetaminophen   Tablet 650 milliGRAM(s) Oral every 6 hours PRN  acetaminophen   Tablet. 650 milliGRAM(s) Oral every 6 hours PRN  ALBUTerol    90 MICROgram(s) HFA Inhaler 2 Puff(s) Inhalation every 6 hours PRN  ALBUTerol/ipratropium for Nebulization 3 milliLiter(s) Nebulizer every 6 hours  aspirin 325 milliGRAM(s) Oral daily  atorvastatin 40 milliGRAM(s) Oral at bedtime  buDESOnide   0.5 milliGRAM(s) Respule 0.5 milliGRAM(s) Inhalation every 12 hours  ertapenem  IVPB 1000 milliGRAM(s) IV Intermittent every 24 hours  heparin  Injectable 5000 Unit(s) SubCutaneous every 8 hours  levothyroxine 50 MICROGram(s) Oral daily  loratadine 10 milliGRAM(s) Oral daily  pantoprazole    Tablet 40 milliGRAM(s) Oral before breakfast      Cardiovascular Diagnostic Testing:  ECG:    Echo: < from: AMAN w/TTE (w/Cont, w/3D Echo) (12.07.17 @ 12:39) >  1. Mild mitral annular calcification. There is a large mass  (measuring approximately 2.1 cm x 1.8 cm) in the left  ventricle ) that extends into the subvalvular apparatus and  involves the ventricular surface of both leaflets of the  mitral valve causing significant obstruction of mitral  inflow. The mass likely represents tumor. Mild mitral  regurgitation. Peak mitral valve gradient equals 19 mm Hg,  mean transmitral valve gradient equals 12-13 mm Hg,  consistent with severe mitral stenosis. (HRabout 80s-90s  bpm) Mean gradient about 24 mmHg with HRabout 100-110 bpm  during TTE.  2. Calcified trileaflet aortic valve with normal opening.  Minimal aortic regurgitation.  3. Normal aortic root and ascending aorta. (Ao: 2.7 cm at  the sinuses of Valsalva). Simple, nonmobile atheroma noted  in the aortic arch and descending aorta.  4. Normal left atrium.  LA volume index = 18 cc/m2. No left  atrial or left atrial appendage thrombus. Normal left  atrial appendage function (velocity> 40 cm/s).  5. Hyperdynamic left ventricular systolic function. No  evidence of LVOT obstruction.  6. Normal diastolic function  7. Mild right atrial enlargement. Linear, mobile  echodensities are seen arising by the right atrial  appendage could represent thrombi, tumor, or vegetation.  8. Right ventricular enlargement with normal right  ventricular systolic function.  9. Agitated saline injection and color flow doppler  demonstrate no evidence of a patent foramen ovale.  10. Trace pericardialeffusion.  11. Bilateral pleural effusions.    < end of copied text >      Stress Testing:    Cath:    Imaging: < from: MR Head No Cont (12.06.17 @ 15:07) >  Numerous small acute infarcts bilaterally in the supratentorial and   infratentorial compartments; consider central embolic phenomenon.    < end of copied text >      Labs:                        11.0   20.3  )-----------( 494      ( 07 Dec 2017 05:25 )             34.9     12-07    137  |  105  |  17  ----------------------------<  176<H>  4.4   |  21<L>  |  0.94    Ca    8.5      07 Dec 2017 05:25      PT/INR - ( 07 Dec 2017 05:25 )   PT: 13.1 sec;   INR: 1.21 ratio      Thyroid Stimulating Hormone, Serum: 2.35 uU/mL (12-06 @ 07:12)      Physical Exam:  T(C): 36.3 (12-08-17 @ 04:57), Max: 36.6 (12-07-17 @ 20:12)  HR: 100 (12-08-17 @ 04:57) (100 - 113)  BP: 130/90 (12-08-17 @ 04:57) (109/73 - 130/90)  RR: 18 (12-08-17 @ 04:57) (18 - 20)  SpO2: 100% (12-08-17 @ 04:57) (93% - 100%)  Wt(kg): --    12-07 @ 07:01  -  12-08 @ 07:00  --------------------------------------------------------  IN: 120 mL / OUT: 0 mL / NET: 120 mL      Daily     Daily

## 2017-12-08 DIAGNOSIS — I51.3 INTRACARDIAC THROMBOSIS, NOT ELSEWHERE CLASSIFIED: ICD-10-CM

## 2017-12-08 DIAGNOSIS — J81.1 CHRONIC PULMONARY EDEMA: ICD-10-CM

## 2017-12-08 DIAGNOSIS — I63.9 CEREBRAL INFARCTION, UNSPECIFIED: ICD-10-CM

## 2017-12-08 LAB
% ALBUMIN: 36.9 % — SIGNIFICANT CHANGE UP
% ALPHA 1: 10.2 % — SIGNIFICANT CHANGE UP
% ALPHA 2: 16.1 % — SIGNIFICANT CHANGE UP
% BETA: 12.9 % — SIGNIFICANT CHANGE UP
% GAMMA: 23.9 % — SIGNIFICANT CHANGE UP
ALBUMIN SERPL ELPH-MCNC: 1.9 G/DL — LOW (ref 3.6–5.5)
ALBUMIN/GLOB SERPL ELPH: 0.6 RATIO — SIGNIFICANT CHANGE UP
ALPHA1 GLOB SERPL ELPH-MCNC: 0.5 G/DL — HIGH (ref 0.1–0.4)
ALPHA2 GLOB SERPL ELPH-MCNC: 0.8 G/DL — SIGNIFICANT CHANGE UP (ref 0.5–1)
ANION GAP SERPL CALC-SCNC: 15 MMOL/L — SIGNIFICANT CHANGE UP (ref 5–17)
APTT BLD: 34.9 SEC — SIGNIFICANT CHANGE UP (ref 27.5–37.4)
B-GLOBULIN SERPL ELPH-MCNC: 0.7 G/DL — SIGNIFICANT CHANGE UP (ref 0.5–1)
BUN SERPL-MCNC: 17 MG/DL — SIGNIFICANT CHANGE UP (ref 7–23)
CALCIUM SERPL-MCNC: 8.2 MG/DL — LOW (ref 8.4–10.5)
CHLORIDE SERPL-SCNC: 104 MMOL/L — SIGNIFICANT CHANGE UP (ref 96–108)
CO2 SERPL-SCNC: 19 MMOL/L — LOW (ref 22–31)
CORTIS AM PEAK SERPL-MCNC: 25.9 UG/DL — HIGH (ref 6–18.4)
CREAT SERPL-MCNC: 0.78 MG/DL — SIGNIFICANT CHANGE UP (ref 0.5–1.3)
GAMMA GLOBULIN: 1.2 G/DL — SIGNIFICANT CHANGE UP (ref 0.6–1.6)
GLUCOSE SERPL-MCNC: 165 MG/DL — HIGH (ref 70–99)
HCT VFR BLD CALC: 31 % — LOW (ref 34.5–45)
HGB BLD-MCNC: 10.3 G/DL — LOW (ref 11.5–15.5)
MCHC RBC-ENTMCNC: 29.6 PG — SIGNIFICANT CHANGE UP (ref 27–34)
MCHC RBC-ENTMCNC: 33.2 GM/DL — SIGNIFICANT CHANGE UP (ref 32–36)
MCV RBC AUTO: 89.2 FL — SIGNIFICANT CHANGE UP (ref 80–100)
NT-PROBNP SERPL-SCNC: HIGH PG/ML (ref 0–300)
PLATELET # BLD AUTO: 602 K/UL — HIGH (ref 150–400)
POTASSIUM SERPL-MCNC: 4.3 MMOL/L — SIGNIFICANT CHANGE UP (ref 3.5–5.3)
POTASSIUM SERPL-SCNC: 4.3 MMOL/L — SIGNIFICANT CHANGE UP (ref 3.5–5.3)
PROCALCITONIN SERPL-MCNC: 0.41 NG/ML — HIGH (ref 0–0.04)
PROT PATTERN SERPL ELPH-IMP: SIGNIFICANT CHANGE UP
RBC # BLD: 3.48 M/UL — LOW (ref 3.8–5.2)
RBC # FLD: 15 % — HIGH (ref 10.3–14.5)
SODIUM SERPL-SCNC: 138 MMOL/L — SIGNIFICANT CHANGE UP (ref 135–145)
WBC # BLD: 18.2 K/UL — HIGH (ref 3.8–10.5)
WBC # FLD AUTO: 18.2 K/UL — HIGH (ref 3.8–10.5)

## 2017-12-08 PROCEDURE — 99223 1ST HOSP IP/OBS HIGH 75: CPT | Mod: GC

## 2017-12-08 PROCEDURE — 99232 SBSQ HOSP IP/OBS MODERATE 35: CPT

## 2017-12-08 PROCEDURE — 75557 CARDIAC MRI FOR MORPH: CPT | Mod: 26

## 2017-12-08 PROCEDURE — 99231 SBSQ HOSP IP/OBS SF/LOW 25: CPT

## 2017-12-08 RX ORDER — ENOXAPARIN SODIUM 100 MG/ML
60 INJECTION SUBCUTANEOUS EVERY 12 HOURS
Qty: 0 | Refills: 0 | Status: DISCONTINUED | OUTPATIENT
Start: 2017-12-08 | End: 2017-12-14

## 2017-12-08 RX ORDER — FUROSEMIDE 40 MG
40 TABLET ORAL DAILY
Qty: 0 | Refills: 0 | Status: DISCONTINUED | OUTPATIENT
Start: 2017-12-08 | End: 2017-12-12

## 2017-12-08 RX ADMIN — Medication 325 MILLIGRAM(S): at 12:28

## 2017-12-08 RX ADMIN — LORATADINE 10 MILLIGRAM(S): 10 TABLET ORAL at 12:28

## 2017-12-08 RX ADMIN — ENOXAPARIN SODIUM 60 MILLIGRAM(S): 100 INJECTION SUBCUTANEOUS at 17:22

## 2017-12-08 RX ADMIN — Medication 3 MILLILITER(S): at 17:22

## 2017-12-08 RX ADMIN — Medication 0.5 MILLIGRAM(S): at 05:23

## 2017-12-08 RX ADMIN — Medication 3 MILLILITER(S): at 23:31

## 2017-12-08 RX ADMIN — ERTAPENEM SODIUM 120 MILLIGRAM(S): 1 INJECTION, POWDER, LYOPHILIZED, FOR SOLUTION INTRAMUSCULAR; INTRAVENOUS at 21:38

## 2017-12-08 RX ADMIN — ATORVASTATIN CALCIUM 40 MILLIGRAM(S): 80 TABLET, FILM COATED ORAL at 21:37

## 2017-12-08 RX ADMIN — Medication 3 MILLILITER(S): at 12:28

## 2017-12-08 RX ADMIN — PANTOPRAZOLE SODIUM 40 MILLIGRAM(S): 20 TABLET, DELAYED RELEASE ORAL at 05:24

## 2017-12-08 RX ADMIN — Medication 0.5 MILLIGRAM(S): at 17:21

## 2017-12-08 RX ADMIN — HEPARIN SODIUM 5000 UNIT(S): 5000 INJECTION INTRAVENOUS; SUBCUTANEOUS at 05:24

## 2017-12-08 RX ADMIN — Medication 40 MILLIGRAM(S): at 16:43

## 2017-12-08 RX ADMIN — Medication 50 MICROGRAM(S): at 05:24

## 2017-12-08 RX ADMIN — Medication 3 MILLILITER(S): at 05:23

## 2017-12-08 NOTE — CONSULT NOTE ADULT - SUBJECTIVE AND OBJECTIVE BOX
HPI: 77y year old woman with pmhx RA, migraines, HLD, hypothyroidism transferred from OSH after mass noted on echo, confirmed on AMAN with large mass L ventricle with mitral obstruction concerning for tumor. CT head performed which suggested right frontal infarcts. MRI brain performed yesterday disclosed multiple tiny scattered supratentorial, right greater than left and infratentorial, tiny cerebellar, infarcts concerning for embolic etiology. CT chest no PE, right apical opacity, bilateral pleural effusions. Concern for underlining malignancy. Awaiting cardiac MRI. Pt complains of mild shortness of breath 'shallow breathing,' denies any headaches or vision changes. Occasional tingling in fingertips and left leg, episodic. No focal facial or limb weakness. No vertigo. No dysarthria.    REVIEW OF SYSTEMS: as per chart	    Allergies  No Known Allergies  Intolerances    MEDICATIONS  (STANDING):  ALBUTerol/ipratropium for Nebulization 3 milliLiter(s) Nebulizer every 6 hours  aspirin 325 milliGRAM(s) Oral daily  atorvastatin 40 milliGRAM(s) Oral at bedtime  buDESOnide   0.5 milliGRAM(s) Respule 0.5 milliGRAM(s) Inhalation every 12 hours  ertapenem  IVPB 1000 milliGRAM(s) IV Intermittent every 24 hours  heparin  Injectable 5000 Unit(s) SubCutaneous every 8 hours  levothyroxine 50 MICROGram(s) Oral daily  loratadine 10 milliGRAM(s) Oral daily  pantoprazole    Tablet 40 milliGRAM(s) Oral before breakfast    MEDICATIONS  (PRN):  acetaminophen   Tablet 650 milliGRAM(s) Oral every 6 hours PRN For Temp greater than 38.5 C (101.3 F)  acetaminophen   Tablet. 650 milliGRAM(s) Oral every 6 hours PRN Mild Pain (1 - 3)  ALBUTerol    90 MICROgram(s) HFA Inhaler 2 Puff(s) Inhalation every 6 hours PRN Shortness of Breath and/or Wheezing    PAST MEDICAL & SURGICAL HISTORY:  Rheumatoid arthritis  Migraines  HLD (hyperlipidemia)  Hypothyroid  No significant past surgical history    Social: No toxic habits  FAMILY HISTORY:  Family history of hypertension (Mother)    Vital Signs Last 24 Hrs  T(C): 36.3 (08 Dec 2017 04:57), Max: 36.6 (07 Dec 2017 20:12)  T(F): 97.4 (08 Dec 2017 04:57), Max: 97.8 (07 Dec 2017 20:12)  HR: 100 (08 Dec 2017 04:57) (100 - 113)  BP: 130/90 (08 Dec 2017 04:57) (109/73 - 130/90)  BP(mean): --  RR: 18 (08 Dec 2017 04:57) (18 - 20)  SpO2: 100% (08 Dec 2017 04:57) (93% - 100%)    NEUROLOGICAL EXAM:    Mental status: Awake, alert, and in no apparent distress. Oriented x 3. Language function is normal. Recent memory, digit span and concentration were normal.     Cranial Nerves: Pupils were equal, round, reactive to light. Extraocular movements were intact. Visual field were full. Fundoscopic exam was deferred. Facial sensation was intact to light touch. There was no facial asymmetry. The palate was upgoing symmetrically and tongue was midline. Hearing acuity was intact to finger rub AU. Shoulder shrug was full bilaterally    Motor exam: Bulk and tone were normal. Strength was 5/5 in all four extremities. Fine finger movements were symmetric and normal. There was no pronator drift    Reflexes: DTRs normoactive in all extremities. Toes were downgoing bilaterally.     Sensation: Intact to light touch, temperature.     Coordination: Finger-nose-finger without dysmetria.     Gait: deferred    NIHSS=0.    < from: CT Angio Chest w/ IV Cont (12.07.17 @ 21:52) >  INTERPRETATION:  no pulmonary embolism. redemonstrated interlobular   septal wall thickening and ground glass opacities, likely representing   pulmonary edema. denser airspace opacity in right lung apex is new and   may represent pulmonary edema, however infection cannot be entirely   excluded. small bilateral pleural effusions, increased since 12/4/17.    < from: MR Head No Cont (12.06.17 @ 15:07) >  FINDINGS:  There are numerous small foci of diffusion restriction, some with   associated T2/FLAIR hyperintensity, bilaterally in the bilateral frontal,   parietal, occipital, left temporal lobes, and bilateral cerebellar   hemispheres, compatible with acute infarcts.    The ventricles and cortical sulci are within normal limits for age. No   intracranial hemorrhage, mass effect or midline shift. The basal cisterns   are patent.    Scattered foci of T2/FLAIR hyperintensity are noted in the   periventricular white matter, nonspecific but likely sequela of small   vessel ischemic disease.    The major intracranial flow voids at the skull base are preserved. The   paranasal sinuses and left mastoid air cells are well aerated. Scattered   fluid signal in the right mastoid air cells.    IMPRESSION:  Numerous small acute infarcts bilaterally in the supratentorial and   infratentorial compartments; consider central embolic phenomenon.      < from: CT Chest No Cont (12.04.17 @ 14:48) >    FINDINGS:    CHEST:     LUNGS AND LARGE AIRWAYS: Patent central airways.  Mild bilateral   interlobular septal thickening and groundglass opacity, consistent with   interstitial pulmonary edema. No pulmonary consolidation.  PLEURA: Trace bilateral pleural effusions.  VESSELS: Within normal limits.  HEART: Heart size is normal. No pericardial effusion.  MEDIASTINUM AND BIRD: No lymphadenopathy.  CHEST WALL AND LOWER NECK: Bilateral peripherally calcified breast   implants.  VISUALIZED UPPER ABDOMEN: Within normal limits.  BONES: Mild degenerative changes.    IMPRESSION: Mild interstitial pulmonary edema and trace bilateral pleural   effusions.    < from: CT Head No Cont (12.04.17 @ 14:42) >  Impression: No acute intracranial hemorrhage.     Small age indeterminate territorial infarct in the right high frontal   lobe. If clinically indicated, brain MR may be pursued for further   evaluation.    Mild chronic small vessel ischemic disease.    < end of copied text >  < from: AMAN w/TTE (w/Cont, w/3D Echo) (12.07.17 @ 12:39) >  Conclusions:  1. Mild mitral annular calcification. There is a large mass  (measuring approximately 2.1 cm x 1.8 cm) in the left  ventricle ) that extends into the subvalvular apparatus and  involves the ventricular surface of both leaflets of the  mitral valve causing significant obstruction of mitral  inflow. The mass likely represents tumor. Mild mitral  regurgitation. Peak mitral valve gradient equals 19 mm Hg,  mean transmitral valve gradient equals 12-13 mm Hg,  consistent with severe mitral stenosis. (HRabout 80s-90s  bpm) Mean gradient about 24 mmHg with HRabout 100-110 bpm  during TTE.  2. Calcified trileaflet aortic valve with normal opening.  Minimal aortic regurgitation.  3. Normal aortic root and ascending aorta. (Ao: 2.7 cm at  the sinuses of Valsalva). Simple, nonmobile atheroma noted  in the aortic arch and descending aorta.  4. Normal left atrium.  LA volume index = 18 cc/m2. No left  atrial or left atrial appendage thrombus. Normal left  atrial appendage function (velocity> 40 cm/s).  5. Hyperdynamic left ventricular systolic function. No  evidence of LVOT obstruction.  6. Normal diastolic function  7. Mild right atrial enlargement. Linear, mobile  echodensities are seen arising by the right atrial  appendage could represent thrombi, tumor, or vegetation.  8. Right ventricular enlargement with normal right  ventricular systolic function.  9. Agitated saline injection and color flow doppler  demonstrate no evidence of a patent foramen ovale.  10. Trace pericardialeffusion.  11. Bilateral pleural effusions.  Consider cardiac MRI for tissue characterization of the  large LV mass and the linear, mobile echodensities in the  right atrium.  *** No previous Echo exam.

## 2017-12-08 NOTE — CONSULT NOTE ADULT - ASSESSMENT
A/P: 77 year old woman with pmhx RA, migraines, HLD, hypothyroidism transferred from OSH after mass identified on echocardiogram, confirmed on AMAN at [facility] with large mass L ventricle with mitral obstruction concerning for tumor, and right atrial appendage mobile echodensities. CT head performed which suggested right frontal infarcts. MRI brain performed yesterday disclosed multiple tiny scattered supratentorial, right greater than left and infratentorial, tiny cerebellar, infarcts concerning for embolic etiology. CT chest no PE, right apical opacity, bilateral pleural effusions. Concern for underlining malignancy. Awaiting cardiac MRI. Neurological examination nonlateralizing. NIHSS=0.    Etiology of infarcts likely cardioembolic, in setting of r/o cardiac tumor r/o endocarditis, possible hypercoaguability of cancer    Plan:  Continue aspirin for now, will likely need anticoagulation if cardiac MRI does not confirm endocarditis vegetation  Follow up Cardiac MRI today  Hematology/oncology consult  Malignancy work up  Neurochecks  MRA head and neck  Statin goal LDL <100  Follow up blood cultures  ID following  Continue Abx for UTI  DVT prophylaxis  Close observation  Will follow    Plan reviewed with CTSurg NP and cardiology Dr. Eubanks

## 2017-12-08 NOTE — CONSULT NOTE ADULT - ASSESSMENT
77 F with RA and progressive dyspnea, found with cardiac mass, consulted for work up    # cardiac mass  -     # MRI brain findings  - 77 F with RA and progressive dyspnea, found with cardiac mass and multiple emboli in the brain, consulted for work up    # cardiac mass  - unclear etiology of mass at this time, however in setting of embolic events likely related to cardiac mass, will need to d/w CT sug regarding role of resection  - awaiting further MRI imaging to characterize mass ( MRI / MRA) , role for cath to eval vasculature?   - may need PET to determine extent of disease     # MRI brain findings  - embolic likely 2/2 to cardiac mass  - appreciate neuro, cards and CT surg input 77 F with RA and progressive dyspnea, found with cardiac mass and multiple emboli in the brain, consulted for work up    # cardiac mass ? vs caseous mitral annular calcification?  - exact underlying etiology of this cardiac finding on AMAN is unclear, however working diagnosis is either mass or now possible caseous mitral annular calcification.  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 Will need to d/w cardiology regarding management of this.   - agree with optimizing CHF management  - per radiology pt will also need further cardiac directed imaging including specific cardiac MRI. Pt will need anti anxiety medication prior to scan. Radiology stated scans can be done when pts cardiac status has been optimized.   - this was d/w cardiothoracic NP and attending Dr. Landaverde.   - work up is still underway and the etiology is not fully defined at this time.   - awaiting cardiology input.     # MRI brain findings  - embolic likely 2/2 to cardiac mass  - appreciate neuro, cards and CT surg input

## 2017-12-08 NOTE — CONSULT NOTE ADULT - ATTENDING COMMENTS
Patient interviewed/examined.  Agree with history, ROS, PE, A/P as above.    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    D/W CTS attending.  Recommend optimization of CHF/diuresis.  Cardiac Radiologists recommended additional MRI procedures to further characterize the heart findings and confirm their diagnostic suspicion after she her CHF is medically improved.      Dov Zavala MD  [phone]
Please page [phone] with questions or call the office [phone].
scattered upper lobe GGO bilaterally  DDx is wide: early pulm edema vs. infection vs. rheumatoid-related  RML nodule COULD be septic emboli but usually you see more than one  PHTN is likely present- If there is MR, this could be a Type II. Not chronic hypoxia to be a Type III. Would at some point rule out PE with CTPA depending on cardiac workup. Pt does have RA- Type I also possible.   Will follow closely.

## 2017-12-08 NOTE — PROGRESS NOTE ADULT - ASSESSMENT
77 F with cardiac mass  ·	Unable to tolerate MRI so unable to characterize mass. It is unlikely endocarditis and more likely represent tumor or thrombus. Therefore given embolic CVA would A/C. Patient was started on lovenox  ·	Echo demonstrates obstruction of MV inflow and LVOT which has results in HF. Continue lasix for now. Strict I and O. If BP tolerates low dose beta blocker to keep HR down.   ·	Would try to repeat MR with anxiolytics to help clarify mass. I am not convinced this is casceating calcification of the mitral valve.

## 2017-12-08 NOTE — CHART NOTE - NSCHARTNOTEFT_GEN_A_CORE
77 F with pmh of Asthma, hld, migraines and UTI recently treated with PO antibiotics presented to [facility]  ED with generalized fatigue and malaise from last 3 days. Patient states that she recently had a ED visit and found to have UTI, was sent home on PO antibiotics after iv hydration. Patient had visit to PCP for R shoulder pain and was treated with PO prednisone. Admitted to the floor at [facility]  for Encephalopathy R/O Stroke vs Infection. Echo- done shows myxoma vs endocarditis - tr. to [facility] from [facility]  [facility] - AMAN done-as per Dr. Landaverde pt will   	need a Cardiac MRI to evaluate for cardiac tumor  Hx ESBL UTI - e. coli-tx'd w/ IV Josiah - Dr. Monson - ID consult appreciated  Head CT shows multiple small infarcts in brain - neuro consult called ()  MRI/MRA head & Neck ordered to assess for Mycotic Aneurysm as per Neuro  Pulmonary consult called -Dr.Mitch Nguyen - chest ct done 12/4/17 -hx asthma  Cardiology consulted d- Dr. Sorin Christensen for management  12/8/17 MRI showed multiple infrantcs likely embolic origin, cardiac MRI revealed < from: MR Cardiac No Cont (12.08.17 @ 11:04) >  A nodular mass with an irregular contour extends the perimeter of posterior mitral valve leaflet measuring approximately 27 mm x 10 mm.    Posterior mitral valve leaflet excursion appears decreased secondary to the associated mass resulting in turbulent mitral inflow.  The   differential diagnosis includes rheumatoid nodules with overlying thrombus, superimposed infective endocarditis and tumor.   Oncology and hematology consults called. Pt satrted on AC lovenox tx dose bid, pt started on mild diuresis as per cardiology. Surgery cancelled. Pt requires further work up. Transferred to medicine service Dr Middleton who accepted patient.

## 2017-12-08 NOTE — PROGRESS NOTE ADULT - SUBJECTIVE AND OBJECTIVE BOX
Follow-up Pulm Progress Note    No new respiratory events overnight.  Denies SOB/CP.     Medications:  MEDICATIONS  (STANDING):  ALBUTerol/ipratropium for Nebulization 3 milliLiter(s) Nebulizer every 6 hours  aspirin 325 milliGRAM(s) Oral daily  atorvastatin 40 milliGRAM(s) Oral at bedtime  buDESOnide   0.5 milliGRAM(s) Respule 0.5 milliGRAM(s) Inhalation every 12 hours  enoxaparin Injectable 60 milliGRAM(s) SubCutaneous every 12 hours  ertapenem  IVPB 1000 milliGRAM(s) IV Intermittent every 24 hours  levothyroxine 50 MICROGram(s) Oral daily  loratadine 10 milliGRAM(s) Oral daily  pantoprazole    Tablet 40 milliGRAM(s) Oral before breakfast    MEDICATIONS  (PRN):  acetaminophen   Tablet 650 milliGRAM(s) Oral every 6 hours PRN For Temp greater than 38.5 C (101.3 F)  acetaminophen   Tablet. 650 milliGRAM(s) Oral every 6 hours PRN Mild Pain (1 - 3)  ALBUTerol    90 MICROgram(s) HFA Inhaler 2 Puff(s) Inhalation every 6 hours PRN Shortness of Breath and/or Wheezing          Vital Signs Last 24 Hrs  T(C): 36.3 (08 Dec 2017 04:57), Max: 36.6 (07 Dec 2017 20:12)  T(F): 97.4 (08 Dec 2017 04:57), Max: 97.8 (07 Dec 2017 20:12)  HR: 100 (08 Dec 2017 04:57) (100 - 113)  BP: 130/90 (08 Dec 2017 04:57) (109/73 - 130/90)  BP(mean): --  RR: 18 (08 Dec 2017 04:57) (18 - 20)  SpO2: 100% (08 Dec 2017 04:57) (93% - 100%) on RA          12-07 @ 07:01  -  12-08 @ 07:00  --------------------------------------------------------  IN: 120 mL / OUT: 0 mL / NET: 120 mL          LABS:                        10.3   18.2  )-----------( 602      ( 08 Dec 2017 09:18 )             31.0     12-08    138  |  104  |  17  ----------------------------<  165<H>  4.3   |  19<L>  |  0.78    Ca    8.2<L>      08 Dec 2017 09:18            CAPILLARY BLOOD GLUCOSE        PT/INR - ( 07 Dec 2017 05:25 )   PT: 13.1 sec;   INR: 1.21 ratio         PTT - ( 08 Dec 2017 09:20 )  PTT:34.9 sec    Procalcitonin, Serum: 0.41 ng/mL (12-08-17 @ 09:18)    Serum Pro-Brain Natriuretic Peptide: 27490 pg/mL (12-08-17 @ 09:18)                CULTURES: (if applicable)  Culture Results:   10,000 - 49,000 CFU/mL Escherichia coli ESBL (12-04 @ 23:14)  Culture Results:   No growth to date. (12-04 @ 18:28)  Culture Results:   No growth to date. (12-04 @ 18:27)    Most recent blood culture -- 12-04 @ 23:14   Escherichia coli ESBL Escherichia coli ESBL .Urine Clean Catch (Midstream) 12-04 @ 23:14  Most recent blood culture -- 12-04 @ 18:28   -- -- .Blood Blood 12-04 @ 18:28  Most recent blood culture -- 12-04 @ 18:27   -- -- .Blood Blood 12-04 @ 18:27    Blood culture 12-04 @ 23:14  --  --  DOUG  Escherichia coli ESBL  Escherichia coli ESBL    Urine culture    -->      Physical Examination:  PULM: Clear to auscultation bilaterally, no significant sputum production  CVS: S1, S2 heard    RADIOLOGY REVIEWED  CTA chest: < from: CT Angio Chest w/ IV Cont (12.07.17 @ 21:52) >  CHEST:     LUNGS AND LARGE AIRWAYS: Patent central airways. Diffuse interlobular   septal thickening and groundglass opacities, consistent with pulmonary   edema. New patchy right apical opacities. Right middle lobe and bilateral   lower lobe subsegmental atelectasis.  PLEURA: Small to moderate bilateral pleural effusions.  VESSELS: No abnormal filling defects to suggest pulmonary embolism.   Reflux of contrast within the hepatic veins. Atherosclerotic changes of   the aorta.  HEART: Approximately 2.0 x 1.7 cm masslesion within the left ventricle.   Heart size is normal.No pericardial effusion.  MEDIASTINUM AND BIRD: Conglomerate lymphadenopathy within the   mediastinum.   CHEST WALL AND LOWER NECK: Peripherally calcified bilateral breast   implants.  VISUALIZED UPPER ABDOMEN: Within normal limits.  BONES: Degenerative changes.    IMPRESSION:     No pulmonary embolism.    2.0 cm mass lesion within the left ventricle adjacent to the posterior   mitral leaflet. Given its location, they may be causing poor mitral   inflow and resultant pulmonary edema. A cardiac MRI is pending .    Small bilateral pleural effusions.    < end of copied text >    AMAN: < from: AMAN w/TTE (w/Cont, w/3D Echo) (12.07.17 @ 12:39) >  Conclusions:  1. Mild mitral annular calcification. There is a large mass  (measuring approximately 2.1 cm x 1.8 cm) in the left  ventricle ) that extends into the subvalvular apparatus and  involves the ventricular surface of both leaflets of the  mitral valve causing significant obstruction of mitral  inflow. The mass likely represents tumor. Mild mitral  regurgitation. Peak mitral valve gradient equals 19 mm Hg,  mean transmitral valve gradient equals 12-13 mm Hg,  consistent with severe mitral stenosis. (HRabout 80s-90s  bpm) Mean gradient about 24 mmHg with HRabout 100-110 bpm  during TTE.  2. Calcified trileaflet aortic valve with normal opening.  Minimal aortic regurgitation.  3. Normal aortic root and ascending aorta. (Ao: 2.7 cm at  the sinuses of Valsalva). Simple, nonmobile atheroma noted  in the aortic arch and descending aorta.  4. Normal left atrium.  LA volume index = 18 cc/m2. No left  atrial or left atrial appendage thrombus. Normal left  atrial appendage function (velocity> 40 cm/s).  5. Hyperdynamic left ventricular systolic function. No  evidence of LVOT obstruction.  6. Normal diastolic function  7. Mild right atrial enlargement. Linear, mobile  echodensities are seen arising by the right atrial  appendage could represent thrombi, tumor, or vegetation.  8. Right ventricular enlargement with normal right  ventricular systolic function.  9. Agitated saline injection and color flow doppler  demonstrate no evidence of a patent foramen ovale.  10. Trace pericardialeffusion.  11. Bilateral pleural effusions.  Consider cardiac MRI for tissue characterization of the  large LV mass and the linear, mobile echodensities in the  right atrium.  *** No previous Echo exam.    < end of copied text > Follow-up Pulm Progress Note    No new respiratory events overnight.  Denies SOB/CP.   97% on 2L NC    Medications:  MEDICATIONS  (STANDING):  ALBUTerol/ipratropium for Nebulization 3 milliLiter(s) Nebulizer every 6 hours  aspirin 325 milliGRAM(s) Oral daily  atorvastatin 40 milliGRAM(s) Oral at bedtime  buDESOnide   0.5 milliGRAM(s) Respule 0.5 milliGRAM(s) Inhalation every 12 hours  enoxaparin Injectable 60 milliGRAM(s) SubCutaneous every 12 hours  ertapenem  IVPB 1000 milliGRAM(s) IV Intermittent every 24 hours  levothyroxine 50 MICROGram(s) Oral daily  loratadine 10 milliGRAM(s) Oral daily  pantoprazole    Tablet 40 milliGRAM(s) Oral before breakfast    MEDICATIONS  (PRN):  acetaminophen   Tablet 650 milliGRAM(s) Oral every 6 hours PRN For Temp greater than 38.5 C (101.3 F)  acetaminophen   Tablet. 650 milliGRAM(s) Oral every 6 hours PRN Mild Pain (1 - 3)  ALBUTerol    90 MICROgram(s) HFA Inhaler 2 Puff(s) Inhalation every 6 hours PRN Shortness of Breath and/or Wheezing          Vital Signs Last 24 Hrs  T(C): 36.3 (08 Dec 2017 04:57), Max: 36.6 (07 Dec 2017 20:12)  T(F): 97.4 (08 Dec 2017 04:57), Max: 97.8 (07 Dec 2017 20:12)  HR: 100 (08 Dec 2017 04:57) (100 - 113)  BP: 130/90 (08 Dec 2017 04:57) (109/73 - 130/90)  BP(mean): --  RR: 18 (08 Dec 2017 04:57) (18 - 20)  SpO2: 100% (08 Dec 2017 04:57) (93% - 100%) on 2L NC          12-07 @ 07:01  -  12-08 @ 07:00  --------------------------------------------------------  IN: 120 mL / OUT: 0 mL / NET: 120 mL          LABS:                        10.3   18.2  )-----------( 602      ( 08 Dec 2017 09:18 )             31.0     12-08    138  |  104  |  17  ----------------------------<  165<H>  4.3   |  19<L>  |  0.78    Ca    8.2<L>      08 Dec 2017 09:18            CAPILLARY BLOOD GLUCOSE        PT/INR - ( 07 Dec 2017 05:25 )   PT: 13.1 sec;   INR: 1.21 ratio         PTT - ( 08 Dec 2017 09:20 )  PTT:34.9 sec    Procalcitonin, Serum: 0.41 ng/mL (12-08-17 @ 09:18)    Serum Pro-Brain Natriuretic Peptide: 73651 pg/mL (12-08-17 @ 09:18)                CULTURES: (if applicable)  Culture Results:   10,000 - 49,000 CFU/mL Escherichia coli ESBL (12-04 @ 23:14)  Culture Results:   No growth to date. (12-04 @ 18:28)  Culture Results:   No growth to date. (12-04 @ 18:27)    Most recent blood culture -- 12-04 @ 23:14   Escherichia coli ESBL Escherichia coli ESBL .Urine Clean Catch (Midstream) 12-04 @ 23:14  Most recent blood culture -- 12-04 @ 18:28   -- -- .Blood Blood 12-04 @ 18:28  Most recent blood culture -- 12-04 @ 18:27   -- -- .Blood Blood 12-04 @ 18:27    Blood culture 12-04 @ 23:14  --  --  DOUG  Escherichia coli ESBL  Escherichia coli ESBL    Urine culture    -->      Physical Examination:  PULM: Clear to auscultation bilaterally, no significant sputum production  CVS: S1, S2 heard    RADIOLOGY REVIEWED  CTA chest: < from: CT Angio Chest w/ IV Cont (12.07.17 @ 21:52) >  CHEST:     LUNGS AND LARGE AIRWAYS: Patent central airways. Diffuse interlobular   septal thickening and groundglass opacities, consistent with pulmonary   edema. New patchy right apical opacities. Right middle lobe and bilateral   lower lobe subsegmental atelectasis.  PLEURA: Small to moderate bilateral pleural effusions.  VESSELS: No abnormal filling defects to suggest pulmonary embolism.   Reflux of contrast within the hepatic veins. Atherosclerotic changes of   the aorta.  HEART: Approximately 2.0 x 1.7 cm masslesion within the left ventricle.   Heart size is normal.No pericardial effusion.  MEDIASTINUM AND BIRD: Conglomerate lymphadenopathy within the   mediastinum.   CHEST WALL AND LOWER NECK: Peripherally calcified bilateral breast   implants.  VISUALIZED UPPER ABDOMEN: Within normal limits.  BONES: Degenerative changes.    IMPRESSION:     No pulmonary embolism.    2.0 cm mass lesion within the left ventricle adjacent to the posterior   mitral leaflet. Given its location, they may be causing poor mitral   inflow and resultant pulmonary edema. A cardiac MRI is pending .    Small bilateral pleural effusions.    < end of copied text >    AMAN: < from: AMAN w/TTE (w/Cont, w/3D Echo) (12.07.17 @ 12:39) >  Conclusions:  1. Mild mitral annular calcification. There is a large mass  (measuring approximately 2.1 cm x 1.8 cm) in the left  ventricle ) that extends into the subvalvular apparatus and  involves the ventricular surface of both leaflets of the  mitral valve causing significant obstruction of mitral  inflow. The mass likely represents tumor. Mild mitral  regurgitation. Peak mitral valve gradient equals 19 mm Hg,  mean transmitral valve gradient equals 12-13 mm Hg,  consistent with severe mitral stenosis. (HRabout 80s-90s  bpm) Mean gradient about 24 mmHg with HRabout 100-110 bpm  during TTE.  2. Calcified trileaflet aortic valve with normal opening.  Minimal aortic regurgitation.  3. Normal aortic root and ascending aorta. (Ao: 2.7 cm at  the sinuses of Valsalva). Simple, nonmobile atheroma noted  in the aortic arch and descending aorta.  4. Normal left atrium.  LA volume index = 18 cc/m2. No left  atrial or left atrial appendage thrombus. Normal left  atrial appendage function (velocity> 40 cm/s).  5. Hyperdynamic left ventricular systolic function. No  evidence of LVOT obstruction.  6. Normal diastolic function  7. Mild right atrial enlargement. Linear, mobile  echodensities are seen arising by the right atrial  appendage could represent thrombi, tumor, or vegetation.  8. Right ventricular enlargement with normal right  ventricular systolic function.  9. Agitated saline injection and color flow doppler  demonstrate no evidence of a patent foramen ovale.  10. Trace pericardialeffusion.  11. Bilateral pleural effusions.  Consider cardiac MRI for tissue characterization of the  large LV mass and the linear, mobile echodensities in the  right atrium.  *** No previous Echo exam.    < end of copied text > Follow-up Pulm Progress Note    No new respiratory events overnight.  Denies SOB/CP.   97% on 2L NC    Medications:  MEDICATIONS  (STANDING):  ALBUTerol/ipratropium for Nebulization 3 milliLiter(s) Nebulizer every 6 hours  aspirin 325 milliGRAM(s) Oral daily  atorvastatin 40 milliGRAM(s) Oral at bedtime  buDESOnide   0.5 milliGRAM(s) Respule 0.5 milliGRAM(s) Inhalation every 12 hours  enoxaparin Injectable 60 milliGRAM(s) SubCutaneous every 12 hours  ertapenem  IVPB 1000 milliGRAM(s) IV Intermittent every 24 hours  levothyroxine 50 MICROGram(s) Oral daily  loratadine 10 milliGRAM(s) Oral daily  pantoprazole    Tablet 40 milliGRAM(s) Oral before breakfast    MEDICATIONS  (PRN):  acetaminophen   Tablet 650 milliGRAM(s) Oral every 6 hours PRN For Temp greater than 38.5 C (101.3 F)  acetaminophen   Tablet. 650 milliGRAM(s) Oral every 6 hours PRN Mild Pain (1 - 3)  ALBUTerol    90 MICROgram(s) HFA Inhaler 2 Puff(s) Inhalation every 6 hours PRN Shortness of Breath and/or Wheezing          Vital Signs Last 24 Hrs  T(C): 36.3 (08 Dec 2017 04:57), Max: 36.6 (07 Dec 2017 20:12)  T(F): 97.4 (08 Dec 2017 04:57), Max: 97.8 (07 Dec 2017 20:12)  HR: 100 (08 Dec 2017 04:57) (100 - 113)  BP: 130/90 (08 Dec 2017 04:57) (109/73 - 130/90)  BP(mean): --  RR: 18 (08 Dec 2017 04:57) (18 - 20)  SpO2: 100% (08 Dec 2017 04:57) (93% - 100%) on 2L NC    12-07 @ 07:01  -  12-08 @ 07:00  --------------------------------------------------------  IN: 120 mL / OUT: 0 mL / NET: 120 mL    LABS:                        10.3   18.2  )-----------( 602      ( 08 Dec 2017 09:18 )             31.0     12-08    138  |  104  |  17  ----------------------------<  165<H>  4.3   |  19<L>  |  0.78    Ca    8.2<L>      08 Dec 2017 09:18    CAPILLARY BLOOD GLUCOSE    PT/INR - ( 07 Dec 2017 05:25 )   PT: 13.1 sec;   INR: 1.21 ratio         PTT - ( 08 Dec 2017 09:20 )  PTT:34.9 sec    Procalcitonin, Serum: 0.41 ng/mL (12-08-17 @ 09:18)    Serum Pro-Brain Natriuretic Peptide: 09120 pg/mL (12-08-17 @ 09:18)    CULTURES: (if applicable)  Culture Results:   10,000 - 49,000 CFU/mL Escherichia coli ESBL (12-04 @ 23:14)  Culture Results:   No growth to date. (12-04 @ 18:28)  Culture Results:   No growth to date. (12-04 @ 18:27)    Most recent blood culture -- 12-04 @ 23:14   Escherichia coli ESBL Escherichia coli ESBL .Urine Clean Catch (Midstream) 12-04 @ 23:14  Most recent blood culture -- 12-04 @ 18:28   -- -- .Blood Blood 12-04 @ 18:28  Most recent blood culture -- 12-04 @ 18:27   -- -- .Blood Blood 12-04 @ 18:27          Physical Examination:  PULM: Decreased bilaterally  CVS: S1, S2 heard    RADIOLOGY REVIEWED  CTA chest: < from: CT Angio Chest w/ IV Cont (12.07.17 @ 21:52) >  CHEST:     LUNGS AND LARGE AIRWAYS: Patent central airways. Diffuse interlobular   septal thickening and groundglass opacities, consistent with pulmonary   edema. New patchy right apical opacities. Right middle lobe and bilateral   lower lobe subsegmental atelectasis.  PLEURA: Small to moderate bilateral pleural effusions.  VESSELS: No abnormal filling defects to suggest pulmonary embolism.   Reflux of contrast within the hepatic veins. Atherosclerotic changes of   the aorta.  HEART: Approximately 2.0 x 1.7 cm masslesion within the left ventricle.   Heart size is normal.No pericardial effusion.  MEDIASTINUM AND BIRD: Conglomerate lymphadenopathy within the   mediastinum.   CHEST WALL AND LOWER NECK: Peripherally calcified bilateral breast   implants.  VISUALIZED UPPER ABDOMEN: Within normal limits.  BONES: Degenerative changes.    IMPRESSION:     No pulmonary embolism.    2.0 cm mass lesion within the left ventricle adjacent to the posterior   mitral leaflet. Given its location, they may be causing poor mitral   inflow and resultant pulmonary edema. A cardiac MRI is pending .    Small bilateral pleural effusions.    < end of copied text >    AMAN: < from: AMAN w/TTE (w/Cont, w/3D Echo) (12.07.17 @ 12:39) >  Conclusions:  1. Mild mitral annular calcification. There is a large mass  (measuring approximately 2.1 cm x 1.8 cm) in the left  ventricle ) that extends into the subvalvular apparatus and  involves the ventricular surface of both leaflets of the  mitral valve causing significant obstruction of mitral  inflow. The mass likely represents tumor. Mild mitral  regurgitation. Peak mitral valve gradient equals 19 mm Hg,  mean transmitral valve gradient equals 12-13 mm Hg,  consistent with severe mitral stenosis. (HRabout 80s-90s  bpm) Mean gradient about 24 mmHg with HRabout 100-110 bpm  during TTE.  2. Calcified trileaflet aortic valve with normal opening.  Minimal aortic regurgitation.  3. Normal aortic root and ascending aorta. (Ao: 2.7 cm at  the sinuses of Valsalva). Simple, nonmobile atheroma noted  in the aortic arch and descending aorta.  4. Normal left atrium.  LA volume index = 18 cc/m2. No left  atrial or left atrial appendage thrombus. Normal left  atrial appendage function (velocity> 40 cm/s).  5. Hyperdynamic left ventricular systolic function. No  evidence of LVOT obstruction.  6. Normal diastolic function  7. Mild right atrial enlargement. Linear, mobile  echodensities are seen arising by the right atrial  appendage could represent thrombi, tumor, or vegetation.  8. Right ventricular enlargement with normal right  ventricular systolic function.  9. Agitated saline injection and color flow doppler  demonstrate no evidence of a patent foramen ovale.  10. Trace pericardialeffusion.  11. Bilateral pleural effusions.  Consider cardiac MRI for tissue characterization of the  large LV mass and the linear, mobile echodensities in the  right atrium.  *** No previous Echo exam.    < end of copied text >

## 2017-12-08 NOTE — PROGRESS NOTE ADULT - SUBJECTIVE AND OBJECTIVE BOX
infectious diseases progress note:    Patient is a 77y old  Female who presents with a chief complaint of transfer for further management (06 Dec 2017 23:42)        Cardiomyopathy  Cardiomyopathy        ROS:  CONSTITUTIONAL:  Negative fever or chills, feels well, good appetite  EYES:  Negative  blurry vision or double vision  CARDIOVASCULAR:  Negative for chest pain or palpitations  RESPIRATORY:  Negative for cough, wheezing, or SOB   GASTROINTESTINAL:  Negative for nausea, vomiting, diarrhea, constipation, or abdominal pain  GENITOURINARY:  Negative frequency, urgency or dysuria  NEUROLOGIC:  No headache, confusion, dizziness, lightheadedness    Allergies    No Known Allergies    Intolerances        ANTIBIOTICS/RELEVANT:  antimicrobials  ertapenem  IVPB 1000 milliGRAM(s) IV Intermittent every 24 hours    immunologic:    OTHER:  acetaminophen   Tablet 650 milliGRAM(s) Oral every 6 hours PRN  acetaminophen   Tablet. 650 milliGRAM(s) Oral every 6 hours PRN  ALBUTerol    90 MICROgram(s) HFA Inhaler 2 Puff(s) Inhalation every 6 hours PRN  ALBUTerol/ipratropium for Nebulization 3 milliLiter(s) Nebulizer every 6 hours  aspirin 325 milliGRAM(s) Oral daily  atorvastatin 40 milliGRAM(s) Oral at bedtime  buDESOnide   0.5 milliGRAM(s) Respule 0.5 milliGRAM(s) Inhalation every 12 hours  heparin  Injectable 5000 Unit(s) SubCutaneous every 8 hours  levothyroxine 50 MICROGram(s) Oral daily  loratadine 10 milliGRAM(s) Oral daily  pantoprazole    Tablet 40 milliGRAM(s) Oral before breakfast      Objective:  Vital Signs Last 24 Hrs  T(C): 36.3 (08 Dec 2017 04:57), Max: 36.6 (07 Dec 2017 20:12)  T(F): 97.4 (08 Dec 2017 04:57), Max: 97.8 (07 Dec 2017 20:12)  HR: 100 (08 Dec 2017 04:57) (100 - 113)  BP: 130/90 (08 Dec 2017 04:57) (109/73 - 130/90)  BP(mean): --  RR: 18 (08 Dec 2017 04:57) (18 - 20)  SpO2: 100% (08 Dec 2017 04:57) (93% - 100%)    PHYSICAL EXAM:   no acute distress  Eyes:AUGIE, EOMI  Ear/Nose/Throat: no oral lesion, no sinus tenderness on percussion	  Neck:no JVD, no lymphadenopathy, supple  Gastrointestinal:soft, (+) BS, no HSM  Extremities:no e/e/c        LABS:                        11.0   20.3  )-----------( 494      ( 07 Dec 2017 05:25 )             34.9     12-07    137  |  105  |  17  ----------------------------<  176<H>  4.4   |  21<L>  |  0.94    Ca    8.5      07 Dec 2017 05:25      PT/INR - ( 07 Dec 2017 05:25 )   PT: 13.1 sec;   INR: 1.21 ratio                 MICROBIOLOGY:    RECENT CULTURES:  12-04 @ 23:14 .Urine Clean Catch (Midstream)   DOUG      Escherichia coli ESBL  Escherichia coli ESBL     10,000 - 49,000 CFU/mL Escherichia coli ESBL    12-04 @ 18:28 .Blood Blood                No growth to date.    12-04 @ 18:27 .Blood Blood                No growth to date.          RESPIRATORY CULTURES:              RADIOLOGY & ADDITIONAL STUDIES:        Pager [phone]  After 5 pm/weekends or if no response :[phone]

## 2017-12-08 NOTE — CONSULT NOTE ADULT - ASSESSMENT
77 F with pmh of Asthma, hld, migraines and UTI recently treated with PO antibiotics presented to ED with generalized fatigue and malaise from last 3 days. Patient states that she recently had a ED visit and found to have UTI, was sent home on PO antibiotics after iv hydration. Patient had visit to PCP for R shoulder pain and was treated with PO prednisone.   Admitted to the floor for Encephalopathy R/O Stroke vs Infection. Echo- done shows myxoma vs endocarditis - tr. to [facility] from [facility] for further cardiac work-up    Hospital course:  12/7/17 - AMAN done-as per Dr. Landaverde pt will   	need a Cardiac MRI to evaluate for cardiac tumor  Hx ESBL UTI - e. coli-tx'd w/ IV Josiah - Dr. Monson - ID consult appreciated  Head CT shows multiple small infarcts in brain - neuro consult called ()  MRI/MRA head & Neck ordered to assess for Mycotic Aneurysm as per Neuro  Pulmonary consult called -Dr.Mitch Nguyen - chest ct done 12/4/17 -hx asthma  Cardiology consulted d- Dr. Sorin Christensen for management    Plan - R/O cardiac thrombus vs tumor - treatment plan will depend on result of Cardiac MRI

## 2017-12-08 NOTE — CONSULT NOTE ADULT - PROBLEM SELECTOR RECOMMENDATION 3
Echo cardiogram significant for Pulmonary HTN  Pulmonary consult called - Dr. Nguyen  chest CT done 12/4.

## 2017-12-08 NOTE — PROGRESS NOTE ADULT - PROBLEM SELECTOR PLAN 2
Seen on AMAN-vegetation, tumor vs. thrombi  -Concerned this is PE in transit  -Start Lovenox 1mg/kg BID  -Await further characterization on cardiac MRI read Seen on AMAN-vegetation, tumor vs. thrombi  -Concerned this is PE in transit  -Start Lovenox 1mg/kg BID  -Patient unable to complete cardiac MRI, d/w radiologist: Study is motion degraded, RA was not well visualized. They do not see any thrombi in RA on MRI

## 2017-12-08 NOTE — PROGRESS NOTE ADULT - ASSESSMENT
76 yo F with RA, Asthma, pulmonary HTN, HLD who presented to [facility] with weakness and dyspnea. Found to have large LV mass, transferred to [facility] for further mgmt. Further findings of acute/subacute CVA concerning for cardioembolic source, CT chest with pulmonary edema. ESBL E.Coli UTI. Severe mitral stenosis 2nd mass. RA with mobile echodensities.

## 2017-12-08 NOTE — CONSULT NOTE ADULT - SUBJECTIVE AND OBJECTIVE BOX
76 yo F with rheumatoid arthritis, asthma, pulmomary HTN, HLD who presented to [facility] with weakness and dyspnea. The patient endorses that she was in her usual state of health until a few weeks ago when she developed back and shoulder pain after moving furniture was treated with prednisone.  Her symptoms did not resolve and received treatment with prednisone.  She was persistently feeling unwell and presented to the ER where she received hydration and was treated for a UTI and released.   She became concerned as she is typically very active and spends her summer at a swim club and can typically ascend stairs without dyspnea.  She returned to the emergency room at [facility] and underewent a TTE revealed an echodensity noted at the base of the posterior wall of the LV and attached to LV septum of unclear etiology.    Transferred to [facility] for further management.  Consults at [facility]:   Pulmonary  ID  Cardiology (06 Dec 2017 23:42)      PAST MEDICAL & SURGICAL HISTORY:  Rheumatoid arthritis  Migraines  HLD (hyperlipidemia)  Hypothyroid  No significant past surgical history      General: denies fevers, chills  Skin/Breast: denies rash   Ophthalmologic: denies blurry vision  ENMT: denies throat pain  Respiratory and Thorax: denies cough, denies shortness of breath  Cardiovascular: denies chest pain, palpitations. Denies LE swelling   Gastrointestinal: denies abdominal pain/ nausea/ vomiting/ diarrhea. Denies BRBPR/ melena   Genitourinary: Denies dysuria  Musculoskeletal: Denies mylagias   Neurological: Denies syncope  Psychiatric: Denies mood disturbance   Hematology/Lymphatics: denies bleeding/bruising. Denies skin lumps 	    MEDICATIONS  (STANDING):  ALBUTerol/ipratropium for Nebulization 3 milliLiter(s) Nebulizer every 6 hours  aspirin 325 milliGRAM(s) Oral daily  atorvastatin 40 milliGRAM(s) Oral at bedtime  buDESOnide   0.5 milliGRAM(s) Respule 0.5 milliGRAM(s) Inhalation every 12 hours  ertapenem  IVPB 1000 milliGRAM(s) IV Intermittent every 24 hours  heparin  Injectable 5000 Unit(s) SubCutaneous every 8 hours  levothyroxine 50 MICROGram(s) Oral daily  loratadine 10 milliGRAM(s) Oral daily  pantoprazole    Tablet 40 milliGRAM(s) Oral before breakfast    MEDICATIONS  (PRN):  acetaminophen   Tablet 650 milliGRAM(s) Oral every 6 hours PRN For Temp greater than 38.5 C (101.3 F)  acetaminophen   Tablet. 650 milliGRAM(s) Oral every 6 hours PRN Mild Pain (1 - 3)  ALBUTerol    90 MICROgram(s) HFA Inhaler 2 Puff(s) Inhalation every 6 hours PRN Shortness of Breath and/or Wheezing      Allergies    No Known Allergies    Intolerances        SOCIAL HISTORY:    FAMILY HISTORY:  Family history of hypertension (Mother)      Vital Signs Last 24 Hrs  T(C): 36.3 (08 Dec 2017 04:57), Max: 36.6 (07 Dec 2017 20:12)  T(F): 97.4 (08 Dec 2017 04:57), Max: 97.8 (07 Dec 2017 20:12)  HR: 100 (08 Dec 2017 04:57) (100 - 113)  BP: 130/90 (08 Dec 2017 04:57) (109/73 - 130/90)  BP(mean): --  RR: 18 (08 Dec 2017 04:57) (18 - 20)  SpO2: 100% (08 Dec 2017 04:57) (93% - 100%)    PHYSICAL EXAM:    GENERAL: NAD, AAOx3   HEAD:  NC/AT  EYES: EOMI, PERRLA, no scleral icterus  HEENT: Moist mucous membranes  LUNG: Clear to auscultation bilaterally; No rales, rhonchi, wheezing, or rubs  HEART: RRR; No murmurs, rubs, or gallops  ABDOMEN: +BS, ST/ND/NT  EXTREMITIES:  2+ Peripheral Pulses, No clubbing, cyanosis, or edema  LAD: no palpable adenopathy    LABS:                        10.3   18.2  )-----------( 602      ( 08 Dec 2017 09:18 )             31.0     12-08    138  |  104  |  17  ----------------------------<  165<H>  4.3   |  19<L>  |  0.78    Ca    8.2<L>      08 Dec 2017 09:18      PT/INR - ( 07 Dec 2017 05:25 )   PT: 13.1 sec;   INR: 1.21 ratio         PTT - ( 08 Dec 2017 09:20 )  PTT:34.9 sec          RADIOLOGY & ADDITIONAL STUDIES:  < from: CT Angio Chest w/ IV Cont (12.07.17 @ 21:52) >  EXAM:  CT ANGIO CHEST (W)AW IC                            PROCEDURE DATE:  12/07/2017            INTERPRETATION:  CLINICAL INFORMATION: Evaluate for pulmonary embolism.   Cardiac mass versus thrombus.    COMPARISON: CT chest 12/4/2017    PROCEDURE:  CTA of the Chest was performed with intravenous contrast.  90 ml of Omnipaque 350 was injected intravenously. 10 ml were discarded.  Sagittal and coronal reformats were performed as well as 3D   Reconstructions.      FINDINGS:    CHEST:     LUNGS AND LARGE AIRWAYS: Patent central airways. Diffuse interlobular   septal thickening and groundglass opacities, consistent with pulmonary   edema. New patchy right apical opacities. Right middle lobe and bilateral   lower lobe subsegmental atelectasis.  PLEURA: Small to moderate bilateral pleural effusions.  VESSELS: No abnormal filling defects to suggest pulmonary embolism.   Reflux of contrast within the hepatic veins. Atherosclerotic changes of   the aorta.  HEART: Approximately 2.0 x 1.7 cm masslesion within the left ventricle.   Heart size is normal.No pericardial effusion.  MEDIASTINUM AND BIRD: Conglomerate lymphadenopathy within the   mediastinum.   CHEST WALL AND LOWER NECK: Peripherally calcified bilateral breast   implants.  VISUALIZED UPPER ABDOMEN: Within normal limits.  BONES: Degenerative changes.    IMPRESSION:     No pulmonary embolism.    2.0 cm mass lesion within the left ventricle adjacent to the posterior   mitral leaflet. Given its location, they may be causing poor mitral   inflow and resultant pulmonary edema. A cardiac MRI is pending .    Small bilateral pleural effusions.    ROHIT MARIE M.D., RADIOLOGY RESIDENT  This document has been electronically signed.  TONYA DESAI M.D., ATTENDING RADIOLOGIST  This document has been electronically signed. Dec  8 2017 11:17AM    < end of copied text >      < from: MR Head w/wo IV Cont (12.07.17 @ 17:45) >    EXAM:  MR BRAIN WAW IC                            PROCEDURE DATE:  12/07/2017            INTERPRETATION:  CLINICAL INDICATION: Endocarditis. Positive head CT for   multiple infarcts. Concern for mycotic aneurysm.    TECHNIQUE: Multiplanar, multisequence MRI brain was performed with and   without intravenous contrast. 7.5 cc Gadavist administered, 0 cc   discarded.    FINDINGS:   Postcontrast sequences are degraded by motion artifact.    There are multiple foci of diffusion restriction many withassociated T2   hyperintensity involving bilateral cerebral and cerebellar hemispheres   compatible with acute emboli, greatest involvement within the right   frontal lobe. These foci also demonstrate enhancement on postcontrast   sequences. There isno associated mass effect or midline shift. There is   no intracranial hemorrhage.    Signal voids are seen within the major intracranial vessels consistent   with their patency.    There is no hydrocephalus.    The sella and suprasellar structures are unremarkable.    The visualized paranasal sinuses and mastoid air cells are clear.     IMPRESSION:   Innumerable small foci of diffusion restriction, many associated with T2   hyperintensity and enhancement. While these likely represent areas of   acute to subacute embolic infarction, the possibility of septic emboli is   also raised.    No intracranial hemorrhage.    Dr. Monterroso discussed the above findings with DIANA Julio at 9:20 AM   on 12/8/2017 with read back.    SAQIB MONTERROSO D.O., RADIOLOGY RESIDENT  This document has been electronically signed.  RHONDA BURCH M.D., ATTENDING RADIOLOGIST  This document has been electronically signed. Dec  8 2017 11:23AM    < end of copied text >    < from: AMAN w/TTE (w/Cont, w/3D Echo) (12.07.17 @ 12:39) >  Patient name: ROGER EASLEY  YOB: 1940   Age: 77 (F)   MR#: [id number]  Study Date: 12/7/2017  Location: [address] [place], [place] [id number]: Jessika Tipton RDCS  [facility] Sonographer: Capo Anders M.D.  Study quality: Technically good  Referring Physician: Lloyd Landaverde MD  Blood Pressure: 139/74 mmHg  Height: 152 cm  Weight: 57 kg  BSA: 1.5 m2  ------------------------------------------------------------------------  PROCEDURE: Transesophageal and transthoracic  echocardiograms with 2-D, M-Mode and complete spectral and  color flow Doppler were performed.  Informed consent was  first obtained for AMAN. The patient was sedated - see  anesthesia record.  The procedure was monitored with  automatic blood pressure monitoring, ECG tracings and pulse  oximetry.  The transesophageal probe was placed in the  esophagus posterior to the heart without complications.  Real-time and reconstructed 3-dimensional imaging was  performed.  Color Doppler analysis was carried out. Patient  was injected with 10 cc's of aerosolized saline. Patient  was injected with 10 cc's of aerosolized saline. Verbal  consent was obtained for injection of echo contrast  following a discussion of risks and benefits. Following  intravenous injection of contrast, harmonic imaging was  performed.  INDICATION: Cerebral infarction, unspecified (I63.9)  ------------------------------------------------------------------------  Dimensions:    Normal Values:  LA:     3.2    2.0 - 4.0 cm  Ao:     2.7    2.0 - 3.8 cm  SEPTUM: 0.7    0.6 - 1.2 cm  PWT:    0.7    0.6 - 1.1 cm  LVIDd:  3.8    3.0 - 5.6 cm  LVIDs:  2.1    1.8 - 4.0 cm  Derived variables:  LVMI: 47 g/m2  RWT: 0.36  Fractional short: 45 %  EF (Visual Estimate): 75-80 %  Doppler Peak Velocity (m/sec): MV=3.0  ------------------------------------------------------------------------  Observations:  Mitral Valve: Mild mitral annular calcification. There is a  large mass (measuring approximately 2.1 cm x 1.8 cm) in the  left ventricle ) that extends into the subvalvular  apparatus and involves the ventricular surface of both  leaflets of the mitral valve causing significant  obstruction of mitral inflow. The mass likely represents  tumor. Mild mitral regurgitation. Peak mitral valve  gradient equals 19 mm Hg, mean transmitral valve gradient  equals 12-13 mm Hg, consistent with severe mitral stenosis.  (HRabout 80s-90s bpm) Mean gradient about 24 mmHg with  HRabout 100-110 bpm during TTE.  Aortic Valve/Aorta: Calcified trileaflet aortic valve with  normal opening. Minimal aortic regurgitation. Peak left  ventricular outflow tract gradient equals 8 mm Hg.  Normal aortic root and ascending aorta. (Ao: 2.7 cm at the  sinuses of Valsalva). Simple, nonmobile atheroma noted in  the aortic arch and descending aorta.  Left Atrium: Normal left atrium.  LA volume index = 18  cc/m2. No left atrial or left atrial appendage thrombus.  Normal left atrial appendage function (velocity> 40 cm/s).  Left Ventricle: Hyperdynamic left ventricular systolic  function. No evidence of LVOT obstruction. Normal left  ventricular internal dimensions and wall thicknesses.  Normal diastolic function  Right Heart: Mild right atrial enlargement. Linear, mobile  echodensities are seen arising by the right atrial  appendage could represent thrombi, tumor, or vegetation.  Right ventricular enlargement with normal right ventricular  systolic function. Normal tricuspid valve. Mild-moderate  tricuspid regurgitation. Pulmonic valve not well  visualized, probably normal. Minimal pulmonic  regurgitation.  Pericardium/Pleura: Trace pericardial effusion.  Bilateral pleural effusions.  Hemodynamic: Estimated right atrial pressure is 8 mm Hg.  Estimated right ventricular systolic pressure equals 53 mm  Hg, assuming right atrial pressure equals 8 mm Hg,  consistent with moderate pulmonary hypertension. Agitated  saline injection and color flow doppler demonstrate no  evidence of a patent foramen ovale.  ------------------------------------------------------------------------  Conclusions:  1. Mild mitral annular calcification. There is a large mass  (measuring approximately 2.1 cm x 1.8 cm) in the left  ventricle ) that extends into the subvalvular apparatus and  involves the ventricular surface of both leaflets of the  mitral valve causing significant obstruction of mitral  inflow. The mass likely represents tumor. Mild mitral  regurgitation. Peak mitral valve gradient equals 19 mm Hg,  mean transmitral valve gradient equals 12-13 mm Hg,  consistent with severe mitral stenosis. (HRabout 80s-90s  bpm) Mean gradient about 24 mmHg with HRabout 100-110 bpm  during TTE.  2. Calcified trileaflet aortic valve with normal opening.  Minimal aortic regurgitation.  3. Normal aortic root and ascending aorta. (Ao: 2.7 cm at  the sinuses of Valsalva). Simple, nonmobile atheroma noted  in the aortic arch and descending aorta.  4. Normal left atrium.  LA volume index = 18 cc/m2. No left  atrial or left atrial appendage thrombus. Normal left  atrial appendage function (velocity> 40 cm/s).  5. Hyperdynamic left ventricular systolic function. No  evidence of LVOT obstruction.  6. Normal diastolic function  7. Mild right atrial enlargement. Linear, mobile  echodensities are seen arising by the right atrial  appendage could represent thrombi, tumor, or vegetation.  8. Right ventricular enlargement with normal right  ventricular systolic function.  9. Agitated saline injection and color flow doppler  demonstrate no evidence of a patent foramen ovale.  10. Trace pericardialeffusion.  11. Bilateral pleural effusions.  Consider cardiac MRI for tissue characterization of the  large LV mass and the linear, mobile echodensities in the  right atrium.  *** No previous Echo exam.  ------------------------------------------------------------------------  Confirmed on  12/7/2017 - 18:52:03 by Ortiz Easton M.D.  ------------------------------------------------------------------------    < end of copied text > 76 yo F with rheumatoid arthritis, asthma, pulmomary HTN, HLD who presented to [facility] with weakness and dyspnea. The patient endorses that she was in her usual state of health until a few weeks ago when she developed back and shoulder pain after moving furniture was treated with prednisone.  Her symptoms did not resolve and received treatment with prednisone.  She was persistently feeling unwell and presented to the ER where she received hydration and was treated for a UTI and released.   She became concerned as she is typically very active and spends her summer at a swim club and can typically ascend stairs without dyspnea.  She returned to the emergency room at [facility] and underewent a TTE revealed an echodensity noted at the base of the posterior wall of the LV and attached to LV septum of unclear etiology.    Transferred to [facility] for further management.    PAST MEDICAL & SURGICAL HISTORY:  Rheumatoid arthritis  Migraines  HLD (hyperlipidemia)  Hypothyroid   past surgical history : meniscal tear repair       General: denies fevers, chills  Skin/Breast: denies rash   Ophthalmologic: denies blurry vision  ENMT: denies throat pain  Respiratory and Thorax: denies cough, denies shortness of breath  Cardiovascular: denies chest pain, palpitations. Denies LE swelling   Gastrointestinal: denies abdominal pain/ nausea/ vomiting/ diarrhea. Denies BRBPR/ melena   Genitourinary: Denies dysuria  Musculoskeletal: Denies mylagias   Neurological: Denies syncope  Psychiatric: Denies mood disturbance , admits to anxiety regarding MRI and closed spaces  Hematology/Lymphatics: denies bleeding/bruising. Denies skin lumps 	    MEDICATIONS  (STANDING):  ALBUTerol/ipratropium for Nebulization 3 milliLiter(s) Nebulizer every 6 hours  aspirin 325 milliGRAM(s) Oral daily  atorvastatin 40 milliGRAM(s) Oral at bedtime  buDESOnide   0.5 milliGRAM(s) Respule 0.5 milliGRAM(s) Inhalation every 12 hours  ertapenem  IVPB 1000 milliGRAM(s) IV Intermittent every 24 hours  heparin  Injectable 5000 Unit(s) SubCutaneous every 8 hours  levothyroxine 50 MICROGram(s) Oral daily  loratadine 10 milliGRAM(s) Oral daily  pantoprazole    Tablet 40 milliGRAM(s) Oral before breakfast    MEDICATIONS  (PRN):  acetaminophen   Tablet 650 milliGRAM(s) Oral every 6 hours PRN For Temp greater than 38.5 C (101.3 F)  acetaminophen   Tablet. 650 milliGRAM(s) Oral every 6 hours PRN Mild Pain (1 - 3)  ALBUTerol    90 MICROgram(s) HFA Inhaler 2 Puff(s) Inhalation every 6 hours PRN Shortness of Breath and/or Wheezing      Allergies    No Known Allergies    Intolerances        SOCIAL HISTORY:    FAMILY HISTORY:  Family history of hypertension (Mother)      Vital Signs Last 24 Hrs  T(C): 36.3 (08 Dec 2017 04:57), Max: 36.6 (07 Dec 2017 20:12)  T(F): 97.4 (08 Dec 2017 04:57), Max: 97.8 (07 Dec 2017 20:12)  HR: 100 (08 Dec 2017 04:57) (100 - 113)  BP: 130/90 (08 Dec 2017 04:57) (109/73 - 130/90)  BP(mean): --  RR: 18 (08 Dec 2017 04:57) (18 - 20)  SpO2: 100% (08 Dec 2017 04:57) (93% - 100%)    PHYSICAL EXAM:    GENERAL: NAD, AAOx3   HEAD:  NC/AT  EYES: EOMI, PERRLA, no scleral icterus  HEENT: Moist mucous membranes  LUNG: Clear to auscultation bilaterally; No rales, rhonchi, wheezing, or rubs  HEART: RRR; No murmurs, rubs, or gallops  ABDOMEN: +BS, ST/ND/NT  EXTREMITIES:  2+ Peripheral Pulses, No clubbing, cyanosis, or edema  LAD: no palpable adenopathy    LABS:                        10.3   18.2  )-----------( 602      ( 08 Dec 2017 09:18 )             31.0     12-08    138  |  104  |  17  ----------------------------<  165<H>  4.3   |  19<L>  |  0.78    Ca    8.2<L>      08 Dec 2017 09:18      PT/INR - ( 07 Dec 2017 05:25 )   PT: 13.1 sec;   INR: 1.21 ratio         PTT - ( 08 Dec 2017 09:20 )  PTT:34.9 sec          RADIOLOGY & ADDITIONAL STUDIES:  < from: CT Angio Chest w/ IV Cont (12.07.17 @ 21:52) >  EXAM:  CT ANGIO CHEST (W)AW IC                            PROCEDURE DATE:  12/07/2017            INTERPRETATION:  CLINICAL INFORMATION: Evaluate for pulmonary embolism.   Cardiac mass versus thrombus.    COMPARISON: CT chest 12/4/2017    PROCEDURE:  CTA of the Chest was performed with intravenous contrast.  90 ml of Omnipaque 350 was injected intravenously. 10 ml were discarded.  Sagittal and coronal reformats were performed as well as 3D   Reconstructions.      FINDINGS:    CHEST:     LUNGS AND LARGE AIRWAYS: Patent central airways. Diffuse interlobular   septal thickening and groundglass opacities, consistent with pulmonary   edema. New patchy right apical opacities. Right middle lobe and bilateral   lower lobe subsegmental atelectasis.  PLEURA: Small to moderate bilateral pleural effusions.  VESSELS: No abnormal filling defects to suggest pulmonary embolism.   Reflux of contrast within the hepatic veins. Atherosclerotic changes of   the aorta.  HEART: Approximately 2.0 x 1.7 cm masslesion within the left ventricle.   Heart size is normal.No pericardial effusion.  MEDIASTINUM AND BIRD: Conglomerate lymphadenopathy within the   mediastinum.   CHEST WALL AND LOWER NECK: Peripherally calcified bilateral breast   implants.  VISUALIZED UPPER ABDOMEN: Within normal limits.  BONES: Degenerative changes.    IMPRESSION:     No pulmonary embolism.    2.0 cm mass lesion within the left ventricle adjacent to the posterior   mitral leaflet. Given its location, they may be causing poor mitral   inflow and resultant pulmonary edema. A cardiac MRI is pending .    Small bilateral pleural effusions.    ROHIT MARIE M.D., RADIOLOGY RESIDENT  This document has been electronically signed.  TONYA DESAI M.D., ATTENDING RADIOLOGIST  This document has been electronically signed. Dec  8 2017 11:17AM    < end of copied text >      < from: MR Head w/wo IV Cont (12.07.17 @ 17:45) >    EXAM:  MR BRAIN WAW IC                            PROCEDURE DATE:  12/07/2017            INTERPRETATION:  CLINICAL INDICATION: Endocarditis. Positive head CT for   multiple infarcts. Concern for mycotic aneurysm.    TECHNIQUE: Multiplanar, multisequence MRI brain was performed with and   without intravenous contrast. 7.5 cc Gadavist administered, 0 cc   discarded.    FINDINGS:   Postcontrast sequences are degraded by motion artifact.    There are multiple foci of diffusion restriction many withassociated T2   hyperintensity involving bilateral cerebral and cerebellar hemispheres   compatible with acute emboli, greatest involvement within the right   frontal lobe. These foci also demonstrate enhancement on postcontrast   sequences. There isno associated mass effect or midline shift. There is   no intracranial hemorrhage.    Signal voids are seen within the major intracranial vessels consistent   with their patency.    There is no hydrocephalus.    The sella and suprasellar structures are unremarkable.    The visualized paranasal sinuses and mastoid air cells are clear.     IMPRESSION:   Innumerable small foci of diffusion restriction, many associated with T2   hyperintensity and enhancement. While these likely represent areas of   acute to subacute embolic infarction, the possibility of septic emboli is   also raised.    No intracranial hemorrhage.    Dr. Monterroso discussed the above findings with DIANA Julio at 9:20 AM   on 12/8/2017 with read back.    SAQIB MONTERROSO D.O., RADIOLOGY RESIDENT  This document has been electronically signed.  RHONDA BURCH M.D., ATTENDING RADIOLOGIST  This document has been electronically signed. Dec  8 2017 11:23AM    < end of copied text >    < from: AMAN w/TTE (w/Cont, w/3D Echo) (12.07.17 @ 12:39) >  Patient name: ROGER EASLEY  YOB: 1940   Age: 77 (F)   MR#: [id number]  Study Date: 12/7/2017  Location: [address] [place], [place] [id number]: Jessika Tipton RDCS  [facility] Sonographer: Capo Anders M.D.  Study quality: Technically good  Referring Physician: Lloyd Landaverde MD  Blood Pressure: 139/74 mmHg  Height: 152 cm  Weight: 57 kg  BSA: 1.5 m2  ------------------------------------------------------------------------  PROCEDURE: Transesophageal and transthoracic  echocardiograms with 2-D, M-Mode and complete spectral and  color flow Doppler were performed.  Informed consent was  first obtained for AMAN. The patient was sedated - see  anesthesia record.  The procedure was monitored with  automatic blood pressure monitoring, ECG tracings and pulse  oximetry.  The transesophageal probe was placed in the  esophagus posterior to the heart without complications.  Real-time and reconstructed 3-dimensional imaging was  performed.  Color Doppler analysis was carried out. Patient  was injected with 10 cc's of aerosolized saline. Patient  was injected with 10 cc's of aerosolized saline. Verbal  consent was obtained for injection of echo contrast  following a discussion of risks and benefits. Following  intravenous injection of contrast, harmonic imaging was  performed.  INDICATION: Cerebral infarction, unspecified (I63.9)  ------------------------------------------------------------------------  Dimensions:    Normal Values:  LA:     3.2    2.0 - 4.0 cm  Ao:     2.7    2.0 - 3.8 cm  SEPTUM: 0.7    0.6 - 1.2 cm  PWT:    0.7    0.6 - 1.1 cm  LVIDd:  3.8    3.0 - 5.6 cm  LVIDs:  2.1    1.8 - 4.0 cm  Derived variables:  LVMI: 47 g/m2  RWT: 0.36  Fractional short: 45 %  EF (Visual Estimate): 75-80 %  Doppler Peak Velocity (m/sec): MV=3.0  ------------------------------------------------------------------------  Observations:  Mitral Valve: Mild mitral annular calcification. There is a  large mass (measuring approximately 2.1 cm x 1.8 cm) in the  left ventricle ) that extends into the subvalvular  apparatus and involves the ventricular surface of both  leaflets of the mitral valve causing significant  obstruction of mitral inflow. The mass likely represents  tumor. Mild mitral regurgitation. Peak mitral valve  gradient equals 19 mm Hg, mean transmitral valve gradient  equals 12-13 mm Hg, consistent with severe mitral stenosis.  (HRabout 80s-90s bpm) Mean gradient about 24 mmHg with  HRabout 100-110 bpm during TTE.  Aortic Valve/Aorta: Calcified trileaflet aortic valve with  normal opening. Minimal aortic regurgitation. Peak left  ventricular outflow tract gradient equals 8 mm Hg.  Normal aortic root and ascending aorta. (Ao: 2.7 cm at the  sinuses of Valsalva). Simple, nonmobile atheroma noted in  the aortic arch and descending aorta.  Left Atrium: Normal left atrium.  LA volume index = 18  cc/m2. No left atrial or left atrial appendage thrombus.  Normal left atrial appendage function (velocity> 40 cm/s).  Left Ventricle: Hyperdynamic left ventricular systolic  function. No evidence of LVOT obstruction. Normal left  ventricular internal dimensions and wall thicknesses.  Normal diastolic function  Right Heart: Mild right atrial enlargement. Linear, mobile  echodensities are seen arising by the right atrial  appendage could represent thrombi, tumor, or vegetation.  Right ventricular enlargement with normal right ventricular  systolic function. Normal tricuspid valve. Mild-moderate  tricuspid regurgitation. Pulmonic valve not well  visualized, probably normal. Minimal pulmonic  regurgitation.  Pericardium/Pleura: Trace pericardial effusion.  Bilateral pleural effusions.  Hemodynamic: Estimated right atrial pressure is 8 mm Hg.  Estimated right ventricular systolic pressure equals 53 mm  Hg, assuming right atrial pressure equals 8 mm Hg,  consistent with moderate pulmonary hypertension. Agitated  saline injection and color flow doppler demonstrate no  evidence of a patent foramen ovale.  ------------------------------------------------------------------------  Conclusions:  1. Mild mitral annular calcification. There is a large mass  (measuring approximately 2.1 cm x 1.8 cm) in the left  ventricle ) that extends into the subvalvular apparatus and  involves the ventricular surface of both leaflets of the  mitral valve causing significant obstruction of mitral  inflow. The mass likely represents tumor. Mild mitral  regurgitation. Peak mitral valve gradient equals 19 mm Hg,  mean transmitral valve gradient equals 12-13 mm Hg,  consistent with severe mitral stenosis. (HRabout 80s-90s  bpm) Mean gradient about 24 mmHg with HRabout 100-110 bpm  during TTE.  2. Calcified trileaflet aortic valve with normal opening.  Minimal aortic regurgitation.  3. Normal aortic root and ascending aorta. (Ao: 2.7 cm at  the sinuses of Valsalva). Simple, nonmobile atheroma noted  in the aortic arch and descending aorta.  4. Normal left atrium.  LA volume index = 18 cc/m2. No left  atrial or left atrial appendage thrombus. Normal left  atrial appendage function (velocity> 40 cm/s).  5. Hyperdynamic left ventricular systolic function. No  evidence of LVOT obstruction.  6. Normal diastolic function  7. Mild right atrial enlargement. Linear, mobile  echodensities are seen arising by the right atrial  appendage could represent thrombi, tumor, or vegetation.  8. Right ventricular enlargement with normal right  ventricular systolic function.  9. Agitated saline injection and color flow doppler  demonstrate no evidence of a patent foramen ovale.  10. Trace pericardialeffusion.  11. Bilateral pleural effusions.  Consider cardiac MRI for tissue characterization of the  large LV mass and the linear, mobile echodensities in the  right atrium.  *** No previous Echo exam.  ------------------------------------------------------------------------  Confirmed on  12/7/2017 - 18:52:03 by Ortiz Easton M.D.  ------------------------------------------------------------------------    < end of copied text >  < from: MR Cardiac No Cont (12.08.17 @ 11:04) >  EXAM:  MR CARDIAC MORPH FUNCTION                            PROCEDURE DATE:  12/08/2017            INTERPRETATION:  HISTORY:  Mr. Easley is a 77-year-old-woman with   rheumatoid arthritis, asthma, hypothyroidism, pulmonary hypertension and   dyslipidemia who reports weakness and dyspnea and was found to have a   mass adherent to the left ventricular aspect of the mitral valve on   transthoracic echocardiography.      CORRELATIONS:    Transesophageal echocardiogram 12.7.17  Mitral valve mass adherent to the left ventricular aspect of the mitral   valve leaflets resulting in inflow obstruction (mean transmitral   gradients > 10 mm Hg).    CT chest non-contrast 12.4.17  Trace bilateral pleural effusions.    CT chest contrast-enhanced 12.7.17  Mass appears lower in attenuation relative to myocardium.  Small bilateral pleural effusions.    INDICATION:  Cardiac mass    EXAMINATION: Cardiac magnetic resonance (CMR) without gadolinium-based   contrast agent.    SEQUENCE:   Balanced steady-state free precession cine    QUALITY:  Degraded image quality due to respiratory motion.    FINDINGS:    Cardiovascular:    A nodular mass with an irregular contour extends the perimeter of   posterior mitral valve leaflet measuring approximately 27 mm x10 mm in a   double oblique short-axis view at the left ventricular base.  Posterior   mitral valve leaflet excursion appears decreased secondary to the   associated mass resulting in turbulent mitral inflow.    The right ventricle appears qualitatively dilated and hypokinetic.  The   interventricular septum flattens in early diastole consistent with right   ventricular volume overload.    Mitral regurgitation present (severity not quantified).    Non-cardiac:  Small to moderate bilateral pleuraleffusions. Engorgement of the central   pulmonary vessels consistent with interstitial edema, as seen on recent   chest CT. Bilateral breast implants noted.    Bilateral breast implants noted.    IMPRESSION:   CMR terminated prior to completion of the study due to patient   preference; degraded image quality due to respiratory motion.  Limited   CMR assessment.  1.  A nodular mass with an irregular contour extends the perimeter of   posterior mitral valve leaflet measuring approximately 27 mm x 10 mm.    Posterior mitral valve leaflet excursion appears decreased secondary to   the associated mass resulting in turbulent mitral inflow.  The   differential diagnosis includes rheumatoid nodules with overlying   thrombus, superimposed infective endocarditis and tumor.  2.  Right ventricle appears qualitatively dilated and hypokinetic.  The   interventricular septum flattens in early diastole consistent with right   ventricular volume overload.  3.  Small to moderate bilateral pleural effusions.                  NAE GRANT M.D., ATTENDING CARDIOLOGIST  This document has been electronically signed.  TONYA DESAI M.D., ATTENDING RADIOLOGIST  This document has been electronically signed. Dec  8 2017  2:07PM

## 2017-12-08 NOTE — CONSULT NOTE ADULT - SUBJECTIVE AND OBJECTIVE BOX
HPI:  78 yo F with rheumatoid arthritis, asthma, pulmomary HTN, HLD who presented to [facility] with weakness and dyspnea. The patient endorses that she was in her usual state of health until a few weeks ago when she developed back and shoulder pain after moving furniture was treated with prednisone.  Her symptoms did not resolve and received treatment with prednisone.  She was persistently feeling unwell and presented to the ER where she received hydration and was treated for a UTI and released.   She became concerned as she is typically very active and spends her summer at a swim club and can typically ascend stairs without dyspnea.  She returned to the emergency room at [facility] and underewent a TTE revealed an echodensity noted at the base of the posterior wall of the LV and attached to LV septum of unclear etiology.    Transferred to [facility] for further management.  Consults at [facility]:   Pulmonary  ID  Cardiology (06 Dec 2017 23:42)      PAST MEDICAL & SURGICAL HISTORY:  Rheumatoid arthritis  Migraines  HLD (hyperlipidemia)  Hypothyroid  No significant past surgical history      Review of Systems:   CONSTITUTIONAL: No fever, weight loss, or fatigue  EYES: No eye pain, visual disturbances, or discharge  ENMT:  No difficulty hearing, tinnitus, vertigo; No sinus or throat pain  NECK: No pain or stiffness  BREASTS: No pain, masses, or nipple discharge  RESPIRATORY: No cough, wheezing, chills or hemoptysis; No shortness of breath  CARDIOVASCULAR: No chest pain, palpitations, dizziness, or leg swelling  GASTROINTESTINAL: No abdominal or epigastric pain. No nausea, vomiting, or hematemesis; No diarrhea or constipation. No melena or hematochezia.  GENITOURINARY: No dysuria, frequency, hematuria, or incontinence  NEUROLOGICAL: No headaches, memory loss, loss of strength, numbness, or tremors  SKIN: No itching, burning, rashes, or lesions   LYMPH NODES: No enlarged glands  ENDOCRINE: No heat or cold intolerance; No hair loss  MUSCULOSKELETAL: No joint pain or swelling; No muscle, back, or extremity pain  PSYCHIATRIC: No depression, anxiety, mood swings, or difficulty sleeping  HEME/LYMPH: No easy bruising, or bleeding gums  ALLERY AND IMMUNOLOGIC: No hives or eczema    Allergies    No Known Allergies    Intolerances        Social History:     FAMILY HISTORY:  Family history of hypertension (Mother)      MEDICATIONS  (STANDING):  ALBUTerol/ipratropium for Nebulization 3 milliLiter(s) Nebulizer every 6 hours  aspirin 325 milliGRAM(s) Oral daily  atorvastatin 40 milliGRAM(s) Oral at bedtime  buDESOnide   0.5 milliGRAM(s) Respule 0.5 milliGRAM(s) Inhalation every 12 hours  enoxaparin Injectable 60 milliGRAM(s) SubCutaneous every 12 hours  ertapenem  IVPB 1000 milliGRAM(s) IV Intermittent every 24 hours  levothyroxine 50 MICROGram(s) Oral daily  loratadine 10 milliGRAM(s) Oral daily  pantoprazole    Tablet 40 milliGRAM(s) Oral before breakfast    MEDICATIONS  (PRN):  acetaminophen   Tablet 650 milliGRAM(s) Oral every 6 hours PRN For Temp greater than 38.5 C (101.3 F)  acetaminophen   Tablet. 650 milliGRAM(s) Oral every 6 hours PRN Mild Pain (1 - 3)  ALBUTerol    90 MICROgram(s) HFA Inhaler 2 Puff(s) Inhalation every 6 hours PRN Shortness of Breath and/or Wheezing      Vital Signs Last 24 Hrs  T(C): 36.6 (08 Dec 2017 14:06), Max: 36.6 (07 Dec 2017 20:12)  T(F): 97.8 (08 Dec 2017 14:06), Max: 97.8 (07 Dec 2017 20:12)  HR: 122 (08 Dec 2017 14:06) (100 - 122)  BP: 116/82 (08 Dec 2017 14:06) (109/73 - 130/90)  BP(mean): --  RR: 18 (08 Dec 2017 14:06) (18 - 20)  SpO2: 100% (08 Dec 2017 14:06) (93% - 100%)  CAPILLARY BLOOD GLUCOSE        I&O's Summary    07 Dec 2017 07:01  -  08 Dec 2017 07:00  --------------------------------------------------------  IN: 120 mL / OUT: 0 mL / NET: 120 mL    08 Dec 2017 07:01  -  08 Dec 2017 14:12  --------------------------------------------------------  IN: 120 mL / OUT: 0 mL / NET: 120 mL        PHYSICAL EXAM:  GENERAL: NAD, well-developed  HEAD:  Atraumatic, Normocephalic  EYES: EOMI, PERRLA, conjunctiva and sclera clear  NECK: Supple, No JVD  CHEST/LUNG: Clear to auscultation bilaterally; No wheeze  HEART: Regular rate and rhythm; No murmurs, rubs, or gallops  ABDOMEN: Soft, Nontender, Nondistended; Bowel sounds present  EXTREMITIES:  2+ Peripheral Pulses, No clubbing, cyanosis, or edema  PSYCH: AAOx3  NEUROLOGY: non-focal  SKIN: No rashes or lesions    LABS:                        10.3   18.2  )-----------( 602      ( 08 Dec 2017 09:18 )             31.0     12-08    138  |  104  |  17  ----------------------------<  165<H>  4.3   |  19<L>  |  0.78    Ca    8.2<L>      08 Dec 2017 09:18      PT/INR - ( 07 Dec 2017 05:25 )   PT: 13.1 sec;   INR: 1.21 ratio         PTT - ( 08 Dec 2017 09:20 )  PTT:34.9 sec          RADIOLOGY & ADDITIONAL TESTS:    Imaging Personally Reviewed:    Consultant(s) Notes Reviewed:      Care Discussed with Consultants/Other Providers:

## 2017-12-08 NOTE — PROGRESS NOTE ADULT - SUBJECTIVE AND OBJECTIVE BOX
CC: Cardia mass, dyspnea.     Interval History:  Unable to tolerate MRI of heart. Still SOB    MEDICATIONS:  acetaminophen   Tablet 650 milliGRAM(s) Oral every 6 hours PRN  acetaminophen   Tablet. 650 milliGRAM(s) Oral every 6 hours PRN  ALBUTerol    90 MICROgram(s) HFA Inhaler 2 Puff(s) Inhalation every 6 hours PRN  ALBUTerol/ipratropium for Nebulization 3 milliLiter(s) Nebulizer every 6 hours  aspirin 325 milliGRAM(s) Oral daily  atorvastatin 40 milliGRAM(s) Oral at bedtime  buDESOnide   0.5 milliGRAM(s) Respule 0.5 milliGRAM(s) Inhalation every 12 hours  enoxaparin Injectable 60 milliGRAM(s) SubCutaneous every 12 hours  ertapenem  IVPB 1000 milliGRAM(s) IV Intermittent every 24 hours  furosemide    Tablet 40 milliGRAM(s) Oral daily  levothyroxine 50 MICROGram(s) Oral daily  loratadine 10 milliGRAM(s) Oral daily  pantoprazole    Tablet 40 milliGRAM(s) Oral before breakfast      LABS:  12-08    138  |  104  |  17  ----------------------------<  165<H>  4.3   |  19<L>  |  0.78    Ca    8.2<L>      08 Dec 2017 09:18                            10.3   18.2  )-----------( 602      ( 08 Dec 2017 09:18 )             31.0     PT/INR - ( 07 Dec 2017 05:25 )   PT: 13.1 sec;   INR: 1.21 ratio         PTT - ( 08 Dec 2017 09:20 )  PTT:34.9 sec          VITAL SIGNS:   T(C): 36.6 (12-08-17 @ 20:05), Max: 36.6 (12-08-17 @ 14:06)  HR: 111 (12-08-17 @ 20:05) (100 - 122)  BP: 109/64 (12-08-17 @ 20:05) (109/64 - 130/90)  RR: 18 (12-08-17 @ 20:05) (18 - 18)  SpO2: 97% (12-08-17 @ 20:05) (97% - 100%)  Daily     Daily   I&O's Summary    07 Dec 2017 07:01  -  08 Dec 2017 07:00  --------------------------------------------------------  IN: 120 mL / OUT: 0 mL / NET: 120 mL    08 Dec 2017 07:01  -  08 Dec 2017 22:14  --------------------------------------------------------  IN: 530 mL / OUT: 0 mL / NET: 530 mL        TELE: No significant ectopy

## 2017-12-08 NOTE — PROGRESS NOTE ADULT - ASSESSMENT
77 yr old with RA  presents with multiple tracey cva possible embolic and an abnormal tte with mass v. clot. veg .   No fever  recent blood cultures negative but urine culture positive for esbl ecoli.   currently on Invanz for urine culture.      blood cultures are negative and anaya suggests tumor not endocardtiis    would complete course of invanz on monday

## 2017-12-09 DIAGNOSIS — D64.9 ANEMIA, UNSPECIFIED: ICD-10-CM

## 2017-12-09 DIAGNOSIS — M06.9 RHEUMATOID ARTHRITIS, UNSPECIFIED: ICD-10-CM

## 2017-12-09 DIAGNOSIS — E03.9 HYPOTHYROIDISM, UNSPECIFIED: ICD-10-CM

## 2017-12-09 DIAGNOSIS — G43.909 MIGRAINE, UNSPECIFIED, NOT INTRACTABLE, WITHOUT STATUS MIGRAINOSUS: ICD-10-CM

## 2017-12-09 DIAGNOSIS — F41.9 ANXIETY DISORDER, UNSPECIFIED: ICD-10-CM

## 2017-12-09 LAB
CULTURE RESULTS: SIGNIFICANT CHANGE UP
CULTURE RESULTS: SIGNIFICANT CHANGE UP
MAGNESIUM SERPL-MCNC: 2.3 MG/DL — SIGNIFICANT CHANGE UP (ref 1.6–2.6)
PHOSPHATE SERPL-MCNC: 3.7 MG/DL — SIGNIFICANT CHANGE UP (ref 2.5–4.5)
SPECIMEN SOURCE: SIGNIFICANT CHANGE UP
SPECIMEN SOURCE: SIGNIFICANT CHANGE UP

## 2017-12-09 PROCEDURE — 99232 SBSQ HOSP IP/OBS MODERATE 35: CPT

## 2017-12-09 RX ORDER — METOPROLOL TARTRATE 50 MG
12.5 TABLET ORAL
Qty: 0 | Refills: 0 | Status: DISCONTINUED | OUTPATIENT
Start: 2017-12-09 | End: 2017-12-14

## 2017-12-09 RX ADMIN — Medication 0.5 MILLIGRAM(S): at 06:07

## 2017-12-09 RX ADMIN — LORATADINE 10 MILLIGRAM(S): 10 TABLET ORAL at 12:04

## 2017-12-09 RX ADMIN — Medication 50 MICROGRAM(S): at 06:08

## 2017-12-09 RX ADMIN — Medication 12.5 MILLIGRAM(S): at 17:13

## 2017-12-09 RX ADMIN — Medication 3 MILLILITER(S): at 06:07

## 2017-12-09 RX ADMIN — ENOXAPARIN SODIUM 60 MILLIGRAM(S): 100 INJECTION SUBCUTANEOUS at 06:07

## 2017-12-09 RX ADMIN — Medication 0.5 MILLIGRAM(S): at 17:14

## 2017-12-09 RX ADMIN — ENOXAPARIN SODIUM 60 MILLIGRAM(S): 100 INJECTION SUBCUTANEOUS at 17:13

## 2017-12-09 RX ADMIN — ERTAPENEM SODIUM 120 MILLIGRAM(S): 1 INJECTION, POWDER, LYOPHILIZED, FOR SOLUTION INTRAMUSCULAR; INTRAVENOUS at 21:15

## 2017-12-09 RX ADMIN — Medication 3 MILLILITER(S): at 17:13

## 2017-12-09 RX ADMIN — Medication 3 MILLILITER(S): at 12:04

## 2017-12-09 RX ADMIN — Medication 40 MILLIGRAM(S): at 06:08

## 2017-12-09 RX ADMIN — Medication 325 MILLIGRAM(S): at 12:04

## 2017-12-09 RX ADMIN — PANTOPRAZOLE SODIUM 40 MILLIGRAM(S): 20 TABLET, DELAYED RELEASE ORAL at 06:08

## 2017-12-09 RX ADMIN — ATORVASTATIN CALCIUM 40 MILLIGRAM(S): 80 TABLET, FILM COATED ORAL at 21:15

## 2017-12-09 NOTE — PROGRESS NOTE ADULT - SUBJECTIVE AND OBJECTIVE BOX
SUBJECTIVE: No new neurologic events overnight.  No new neurologic complaints.   No headache  Occasional right hand tingling, attributes to carpel tunnel  Started on Lovenox  Cardiac MRI performed     < from: MR Cardiac No Cont (12.08.17 @ 11:04) >    IMPRESSION:   CMR terminated prior to completion of the study due to patient   preference; degraded image quality due to respiratory motion.  Limited   CMR assessment.  1.  A nodular mass with an irregular contour extends the perimeter of   posterior mitral valve leaflet measuring approximately 27 mm x 10 mm.    Posterior mitral valve leaflet excursion appears decreased secondary to   the associated mass resulting in turbulent mitral inflow.  The   differential diagnosis includes rheumatoid nodules with overlying   thrombus, superimposed infective endocarditis and tumor.  2.  Right ventricle appears qualitatively dilated and hypokinetic.  The   interventricular septum flattens in early diastole consistent with right   ventricular volume overload.  3.  Small to moderate bilateral pleural effusions.      Medications:  acetaminophen   Tablet 650 milliGRAM(s) Oral every 6 hours PRN  acetaminophen   Tablet. 650 milliGRAM(s) Oral every 6 hours PRN  ALBUTerol    90 MICROgram(s) HFA Inhaler 2 Puff(s) Inhalation every 6 hours PRN  ALBUTerol/ipratropium for Nebulization 3 milliLiter(s) Nebulizer every 6 hours  aspirin 325 milliGRAM(s) Oral daily  atorvastatin 40 milliGRAM(s) Oral at bedtime  buDESOnide   0.5 milliGRAM(s) Respule 0.5 milliGRAM(s) Inhalation every 12 hours  enoxaparin Injectable 60 milliGRAM(s) SubCutaneous every 12 hours  ertapenem  IVPB 1000 milliGRAM(s) IV Intermittent every 24 hours  furosemide    Tablet 40 milliGRAM(s) Oral daily  levothyroxine 50 MICROGram(s) Oral daily  loratadine 10 milliGRAM(s) Oral daily  metoprolol     tartrate 12.5 milliGRAM(s) Oral two times a day  pantoprazole    Tablet 40 milliGRAM(s) Oral before breakfast      Labs:  CBC Full  -  ( 08 Dec 2017 09:18 )  WBC Count : 18.2 K/uL  Hemoglobin : 10.3 g/dL  Hematocrit : 31.0 %  Platelet Count - Automated : 602 K/uL  Mean Cell Volume : 89.2 fl  Mean Cell Hemoglobin : 29.6 pg  Mean Cell Hemoglobin Concentration : 33.2 gm/dL  Auto Neutrophil # : x  Auto Lymphocyte # : x  Auto Monocyte # : x  Auto Eosinophil # : x  Auto Basophil # : x  Auto Neutrophil % : x  Auto Lymphocyte % : x  Auto Monocyte % : x  Auto Eosinophil % : x  Auto Basophil % : x    12-08    138  |  104  |  17  ----------------------------<  165<H>  4.3   |  19<L>  |  0.78    Ca    8.2<L>      08 Dec 2017 09:18  Phos  3.7     12-09  Mg     2.3     12-09    PTT - ( 08 Dec 2017 09:20 )  PTT:34.9 sec    Vitals:  Vital Signs Last 24 Hrs  T(C): 36.4 (09 Dec 2017 04:56), Max: 36.6 (08 Dec 2017 14:06)  T(F): 97.5 (09 Dec 2017 04:56), Max: 97.8 (08 Dec 2017 14:06)  HR: 97 (09 Dec 2017 04:56) (97 - 122)  BP: 148/93 (09 Dec 2017 04:56) (109/64 - 148/93)  BP(mean): --  RR: 18 (09 Dec 2017 04:56) (18 - 18)  SpO2: 100% (09 Dec 2017 04:56) (97% - 100%)      NEUROLOGICAL EXAM:    Mental status: Awake, alert, and in no apparent distress. Oriented x 3. Language function is normal. Recent memory, digit span and concentration were normal.     Cranial Nerves: Pupils were equal, round, reactive to light. Extraocular movements were intact. Visual field were full. Fundoscopic exam was deferred. Facial sensation was intact to light touch. There was no facial asymmetry. The palate was upgoing symmetrically and tongue was midline. Hearing acuity was intact to finger rub AU. Shoulder shrug was full bilaterally    Motor exam: Bulk and tone were normal. Strength was 5/5 in all four extremities. Fine finger movements were symmetric and normal. There was no pronator drift    Reflexes: DTRs normoactive in all extremities. Toes were downgoing bilaterally.     Sensation: Intact to light touch, temperature.     Coordination: Finger-nose-finger without dysmetria.     Gait: deferred    NIHSS=0.    < from: CT Angio Chest w/ IV Cont (12.07.17 @ 21:52) >  INTERPRETATION:  no pulmonary embolism. redemonstrated interlobular   septal wall thickening and ground glass opacities, likely representing   pulmonary edema. denser airspace opacity in right lung apex is new and   may represent pulmonary edema, however infection cannot be entirely   excluded. small bilateral pleural effusions, increased since 12/4/17.    < from: MR Head No Cont (12.06.17 @ 15:07) >  FINDINGS:  There are numerous small foci of diffusion restriction, some with   associated T2/FLAIR hyperintensity, bilaterally in the bilateral frontal,   parietal, occipital, left temporal lobes, and bilateral cerebellar   hemispheres, compatible with acute infarcts.    The ventricles and cortical sulci are within normal limits for age. No   intracranial hemorrhage, mass effect or midline shift. The basal cisterns   are patent.    Scattered foci of T2/FLAIR hyperintensity are noted in the   periventricular white matter, nonspecific but likely sequela of small   vessel ischemic disease.    The major intracranial flow voids at the skull base are preserved. The   paranasal sinuses and left mastoid air cells are well aerated. Scattered   fluid signal in the right mastoid air cells.    IMPRESSION:  Numerous small acute infarcts bilaterally in the supratentorial and   infratentorial compartments; consider central embolic phenomenon.      < from: CT Chest No Cont (12.04.17 @ 14:48) >    FINDINGS:    CHEST:     LUNGS AND LARGE AIRWAYS: Patent central airways.  Mild bilateral   interlobular septal thickening and groundglass opacity, consistent with   interstitial pulmonary edema. No pulmonary consolidation.  PLEURA: Trace bilateral pleural effusions.  VESSELS: Within normal limits.  HEART: Heart size is normal. No pericardial effusion.  MEDIASTINUM AND BIRD: No lymphadenopathy.  CHEST WALL AND LOWER NECK: Bilateral peripherally calcified breast   implants.  VISUALIZED UPPER ABDOMEN: Within normal limits.  BONES: Mild degenerative changes.    IMPRESSION: Mild interstitial pulmonary edema and trace bilateral pleural   effusions.    < from: CT Head No Cont (12.04.17 @ 14:42) >  Impression: No acute intracranial hemorrhage.     Small age indeterminate territorial infarct in the right high frontal   lobe. If clinically indicated, brain MR may be pursued for further   evaluation.    Mild chronic small vessel ischemic disease.    < end of copied text >  < from: AMAN w/TTE (w/Cont, w/3D Echo) (12.07.17 @ 12:39) >  Conclusions:  1. Mild mitral annular calcification. There is a large mass  (measuring approximately 2.1 cm x 1.8 cm) in the left  ventricle ) that extends into the subvalvular apparatus and  involves the ventricular surface of both leaflets of the  mitral valve causing significant obstruction of mitral  inflow. The mass likely represents tumor. Mild mitral  regurgitation. Peak mitral valve gradient equals 19 mm Hg,  mean transmitral valve gradient equals 12-13 mm Hg,  consistent with severe mitral stenosis. (HRabout 80s-90s  bpm) Mean gradient about 24 mmHg with HRabout 100-110 bpm  during TTE.  2. Calcified trileaflet aortic valve with normal opening.  Minimal aortic regurgitation.  3. Normal aortic root and ascending aorta. (Ao: 2.7 cm at  the sinuses of Valsalva). Simple, nonmobile atheroma noted  in the aortic arch and descending aorta.  4. Normal left atrium.  LA volume index = 18 cc/m2. No left  atrial or left atrial appendage thrombus. Normal left  atrial appendage function (velocity> 40 cm/s).  5. Hyperdynamic left ventricular systolic function. No  evidence of LVOT obstruction.  6. Normal diastolic function  7. Mild right atrial enlargement. Linear, mobile  echodensities are seen arising by the right atrial  appendage could represent thrombi, tumor, or vegetation.  8. Right ventricular enlargement with normal right  ventricular systolic function.  9. Agitated saline injection and color flow doppler  demonstrate no evidence of a patent foramen ovale.  10. Trace pericardialeffusion.  11. Bilateral pleural effusions.  Consider cardiac MRI for tissue characterization of the  large LV mass and the linear, mobile echodensities in the  right atrium.  *** No previous Echo exam.

## 2017-12-09 NOTE — PROGRESS NOTE ADULT - PROBLEM SELECTOR PLAN 1
2nd severe mitral inflow obstruction 2nd LV mass   -Diuresis with Lasix as tolerated   -Dyspnea improved with diuresis   -Duoneb q6

## 2017-12-09 NOTE — CONSULT NOTE ADULT - ASSESSMENT
cardiac mass, needs definitve Dx  MID, CVA, asymptomatic  neuro, possibly embolic Ds, starting a/c  cardio, pulmonary, ID, all notes reviewed and discussed c Pt and will discuss c Dr Degroot PCP  will f/u for continuity  should try OFF O2  needs to ambulate, will order PT

## 2017-12-09 NOTE — PROGRESS NOTE ADULT - ASSESSMENT
A/P: 77 year old woman with pmhx RA, migraines, HLD, hypothyroidism transferred from OSH after mass identified on echocardiogram, confirmed on MAAN at [facility] with large mass L ventricle with mitral obstruction concerning for tumor, and right atrial appendage mobile echodensities. CT head performed which suggested right frontal infarcts. MRI brain performed yesterday disclosed multiple tiny scattered supratentorial, right greater than left and infratentorial, tiny cerebellar, infarcts concerning for embolic etiology. CT chest no PE, right apical opacity, bilateral pleural effusions. Concern for underlining malignancy. Awaiting cardiac MRI. Neurological examination nonlateralizing. NIHSS=0.    Etiology of infarcts likely cardioembolic, in setting of r/o cardiac tumor r/o endocarditis, possible hypercoaguability of cancer    Cardiac MRI  A nodular mass with an irregular contour extends the perimeter of   posterior mitral valve leaflet measuring approximately 27 mm x 10 mm.    Posterior mitral valve leaflet excursion appears decreased secondary to   the associated mass resulting in turbulent mitral inflow.  The   differential diagnosis includes rheumatoid nodules with overlying   thrombus, superimposed infective endocarditis and tumor.    Neuro stable    Plan:  Started on AC with Lovenox as per cardiology  Further management of cardiac mass as per cardiology/CTSurg  Continue Neurochecks  Follow up blood cultures  Continue Abx for UTI  Heme/Onc follow up  Close observation  Will follow    Plan reviewed with pt

## 2017-12-09 NOTE — PROGRESS NOTE ADULT - ASSESSMENT
77 F with cardiac mass  ·	Brief PAT. Would add low dose beta blocker. Lopressor 12.5 BID up titrate as tolerated  ·	Continue lasix. Strict I and O.   ·	Will need to revisit cardiac imaging to help define cardiac lesion. May require biopsy. 77 F with cardiac mass  ·	Brief PAT. Would add low dose beta blocker. Lopressor 12.5 BID up titrate as tolerated  ·	Continue lasix. Strict I and O.   ·	Will need to revisit cardiac imaging to help define cardiac lesion. May require biopsy.   ·	Continue A/C with lovenox for now.

## 2017-12-09 NOTE — PROGRESS NOTE ADULT - ASSESSMENT
76 yo F with RA, Asthma, pulmonary HTN, HLD who presented to [facility] with weakness and dyspnea. Found to have large LV mass, transferred to [facility] for further mgmt. Further findings of acute/subacute CVA concerning for cardioembolic source, CT chest with pulmonary edema. ESBL E.Coli UTI. Severe mitral stenosis 2nd mass. RA with mobile echodensities.

## 2017-12-09 NOTE — CONSULT NOTE ADULT - PROBLEM SELECTOR RECOMMENDATION 5
C/W Statin  Check Lipid Profile in AM: Elevated TG.
R shoulder pain 2/2 fall not RA
and weakness after prednisone taper  r/o adrenal insuffiency      check am cortisol

## 2017-12-09 NOTE — PROGRESS NOTE ADULT - SUBJECTIVE AND OBJECTIVE BOX
Follow-up Pulm Progress Note    Dyspnea improved  97% on RA    Medications:  MEDICATIONS  (STANDING):  ALBUTerol/ipratropium for Nebulization 3 milliLiter(s) Nebulizer every 6 hours  aspirin 325 milliGRAM(s) Oral daily  atorvastatin 40 milliGRAM(s) Oral at bedtime  buDESOnide   0.5 milliGRAM(s) Respule 0.5 milliGRAM(s) Inhalation every 12 hours  enoxaparin Injectable 60 milliGRAM(s) SubCutaneous every 12 hours  ertapenem  IVPB 1000 milliGRAM(s) IV Intermittent every 24 hours  furosemide    Tablet 40 milliGRAM(s) Oral daily  levothyroxine 50 MICROGram(s) Oral daily  loratadine 10 milliGRAM(s) Oral daily  metoprolol     tartrate 12.5 milliGRAM(s) Oral two times a day  pantoprazole    Tablet 40 milliGRAM(s) Oral before breakfast    MEDICATIONS  (PRN):  acetaminophen   Tablet 650 milliGRAM(s) Oral every 6 hours PRN For Temp greater than 38.5 C (101.3 F)  acetaminophen   Tablet. 650 milliGRAM(s) Oral every 6 hours PRN Mild Pain (1 - 3)  ALBUTerol    90 MICROgram(s) HFA Inhaler 2 Puff(s) Inhalation every 6 hours PRN Shortness of Breath and/or Wheezing          Vital Signs Last 24 Hrs  T(C): 36.4 (09 Dec 2017 04:56), Max: 36.6 (08 Dec 2017 14:06)  T(F): 97.5 (09 Dec 2017 04:56), Max: 97.8 (08 Dec 2017 14:06)  HR: 97 (09 Dec 2017 04:56) (97 - 122)  BP: 148/93 (09 Dec 2017 04:56) (109/64 - 148/93)  BP(mean): --  RR: 18 (09 Dec 2017 04:56) (18 - 18)  SpO2: 100% (09 Dec 2017 04:56) (97% - 100%) on RA          12-08 @ 07:01  -  12-09 @ 07:00  --------------------------------------------------------  IN: 710 mL / OUT: 400 mL / NET: 310 mL          LABS:                        10.3   18.2  )-----------( 602      ( 08 Dec 2017 09:18 )             31.0     12-08    138  |  104  |  17  ----------------------------<  165<H>  4.3   |  19<L>  |  0.78    Ca    8.2<L>      08 Dec 2017 09:18  Phos  3.7     12-09  Mg     2.3     12-09            CAPILLARY BLOOD GLUCOSE        PTT - ( 08 Dec 2017 09:20 )  PTT:34.9 sec    Procalcitonin, Serum: 0.41 ng/mL (12-08-17 @ 09:18)    Serum Pro-Brain Natriuretic Peptide: 27037 pg/mL (12-08-17 @ 09:18)                CULTURES: (if applicable)  Culture Results:   10,000 - 49,000 CFU/mL Escherichia coli ESBL (12-04 @ 23:14)  Culture Results:   No growth to date. (12-04 @ 18:28)  Culture Results:   No growth to date. (12-04 @ 18:27)    Most recent blood culture -- 12-04 @ 23:14   Escherichia coli ESBL Escherichia coli ESBL .Urine Clean Catch (Midstream) 12-04 @ 23:14  Most recent blood culture -- 12-04 @ 18:28   -- -- .Blood Blood 12-04 @ 18:28  Most recent blood culture -- 12-04 @ 18:27   -- -- .Blood Blood 12-04 @ 18:27    Blood culture 12-04 @ 23:14  --  --  DOUG  Escherichia coli ESBL  Escherichia coli ESBL    Urine culture    -->      Physical Examination:  PULM: Decreased BS at bases  CVS: S1, S2 RRR    RADIOLOGY REVIEWED  CTA chest: < from: CT Angio Chest w/ IV Cont (12.07.17 @ 21:52) >  CHEST:     LUNGS AND LARGE AIRWAYS: Patent central airways. Diffuse interlobular   septal thickening and groundglass opacities, consistent with pulmonary   edema. New patchy right apical opacities. Right middlelobe and bilateral   lower lobe subsegmental atelectasis.  PLEURA: Small to moderate bilateral pleural effusions.  VESSELS: No abnormal filling defects to suggest pulmonary embolism.   Reflux of contrast within the hepatic veins. Atherosclerotic changes of   the aorta.  HEART: Approximately 2.0 x 1.7 cm mass lesion within the left ventricle.   Heart size is normal.No pericardial effusion.  MEDIASTINUM AND BIRD: Conglomerate lymphadenopathy within the   mediastinum.   CHEST WALL AND LOWER NECK: Peripherally calcified bilateral breast   implants.  VISUALIZED UPPER ABDOMEN: Within normal limits.  BONES: Degenerative changes.    IMPRESSION:     No pulmonary embolism.    2.0 cm mass lesion within the left ventricle adjacent to the posterior   mitral leaflet. Given its location, they may be causing poor mitral   inflow and resultant pulmonary edema. A cardiac MRI is pending .    Small bilateral pleural effusions.    < end of copied text >

## 2017-12-09 NOTE — CONSULT NOTE ADULT - SUBJECTIVE AND OBJECTIVE BOX
Patient is a 77y old  Female who presents with a chief complaint of transfer for further management (06 Dec 2017 23:42)      HPI: from Adm H+P:  76 yo F with rheumatoid arthritis, asthma, pulmomary HTN, HLD who presented to [facility] with weakness and dyspnea. The patient endorses that she was in her usual state of health until a few weeks ago when she developed back and shoulder pain after moving furniture was treated with prednisone.  Her symptoms did not resolve and received treatment with prednisone.  She was persistently feeling unwell and presented to the ER where she received hydration and was treated for a UTI and released.   She became concerned as she is typically very active and spends her summer at a swim club and can typically ascend stairs without dyspnea.  She returned to the emergency room at [facility] and underewent a TTE revealed an echodensity noted at the base of the posterior wall of the LV and attached to LV septum of unclear etiology.    Transferred to [facility] for further management.  Consults at [facility]:   Pulmonary  ID  Cardiology (06 Dec 2017 23:42)        PAST MEDICAL & SURGICAL HISTORY:  Rheumatoid arthritis  Migraines  HLD (hyperlipidemia)  Hypothyroid  No significant past surgical history  + recent fall at [facility]>pain r shoulder, saw rheum rx prednisone, after that pt had AMS weakness dyspnea, Dx UTI (ESBL EColi) leading to hospitalIZATION, Dx LV mass, tumor v. infection v. thrombus, transf to [facility]    Hx Asthma,       Medications:  acetaminophen   Tablet 650 milliGRAM(s) Oral every 6 hours PRN  acetaminophen   Tablet. 650 milliGRAM(s) Oral every 6 hours PRN  ALBUTerol    90 MICROgram(s) HFA Inhaler 2 Puff(s) Inhalation every 6 hours PRN  ALBUTerol/ipratropium for Nebulization 3 milliLiter(s) Nebulizer every 6 hours  aspirin 325 milliGRAM(s) Oral daily  atorvastatin 40 milliGRAM(s) Oral at bedtime  buDESOnide   0.5 milliGRAM(s) Respule 0.5 milliGRAM(s) Inhalation every 12 hours  enoxaparin Injectable 60 milliGRAM(s) SubCutaneous every 12 hours  ertapenem  IVPB 1000 milliGRAM(s) IV Intermittent every 24 hours  furosemide    Tablet 40 milliGRAM(s) Oral daily  levothyroxine 50 MICROGram(s) Oral daily  loratadine 10 milliGRAM(s) Oral daily  metoprolol     tartrate 12.5 milliGRAM(s) Oral two times a day  pantoprazole    Tablet 40 milliGRAM(s) Oral before breakfast        FAMILY HISTORY:  Family history of hypertension (Mother) , stroke  father smoker ,  aneurism ref Sx  one Daughter A+W  no fam Hx TB, nor DM      Social History  lives alone, was fxnl indep, dtr visits freq, non smoker (lite 50 yrs ago) no etoh    REVIEW OF SYSTEMS      General:feels better today but using O2, nebs,  hasnt ambulated much, oob, eating little, nad	    Skin/Breast:  	  Ophthalmologic: no ch v/h  	  ENMT:	chronic dry mouth    Respiratory and Thorax: no sob now occ cough no sputum  	  Cardiovascular:	no cp palp    Gastrointestinal:	 no nvcd    Genitourinary:	no f/d/i    Musculoskeletal:	 pain r shoulder, alittle better    Neurological:	no deficits denies memory loss, NF ATMAE    Psychiatric:	no HX    Hematology/Lymphatics:	    Endocrine:	no poly u/d/d    Allergic/Immunologic:	nka  AOSN    Vital Signs Last 24 Hrs  T(C): 36.4 (09 Dec 2017 04:56), Max: 36.6 (08 Dec 2017 14:06)  T(F): 97.5 (09 Dec 2017 04:56), Max: 97.8 (08 Dec 2017 14:06)  HR: 97 (09 Dec 2017 04:56) (97 - 122)  BP: 148/93 (09 Dec 2017 04:56) (109/64 - 148/93)  BP(mean): --  RR: 18 (09 Dec 2017 04:56) (18 - 18)  SpO2: 100% (09 Dec 2017 04:56) (97% - 100%)    Physical Exam: vssa af nad, thoin  H&N: nc at, bebe cwnl, om hydrated not dry, no cb ,tm  CV:rrr  Pulm: ctab r left base min rales ronchi,. no wheeze bl  GI: bs+ soft nt  :  Extrem: no cce,  Skin: nl not dry no discoloration  Vasc:  Neuro:Speech clear               Affect:approp               Memory:ok               Judgment: nl               Orientation:x3               Cognition:intact               Sensory:gr nl               Motor:nf atmae oob in chair               Gait: can but says she needs assist               CN: gr nl  Psych:  Other:    Labs:  CBC Full  -  ( 08 Dec 2017 09:18 )  WBC Count : 18.2 K/uL  Hemoglobin : 10.3 g/dL  Hematocrit : 31.0 %  Platelet Count - Automated : 602 K/uL  Mean Cell Volume : 89.2 fl  Mean Cell Hemoglobin : 29.6 pg  Mean Cell Hemoglobin Concentration : 33.2 gm/dL  Auto Neutrophil # : x  Auto Lymphocyte # : x  Auto Monocyte # : x  Auto Eosinophil # : x  Auto Basophil # : x  Auto Neutrophil % : x  Auto Lymphocyte % : x  Auto Monocyte % : x  Auto Eosinophil % : x  Auto Basophil % : x          138  |  104  |  17  ----------------------------<  165<H>  4.3   |  19<L>  |  0.78    Ca    8.2<L>      08 Dec 2017 09:18  Phos  3.7       Mg     2.3                 PTT - ( 08 Dec 2017 09:20 )  PTT:34.9 sec    HEALTH ISSUES - PROBLEM Dx:  CVA (cerebral vascular accident): CVA (cerebral vascular accident)  Thrombus of right atrial appendage without antecedent myocardial infarction: Thrombus of right atrial appendage without antecedent myocardial infarction  Pulmonary edema: Pulmonary edema  UTI (urinary tract infection): UTI (urinary tract infection)  Fatigue: Fatigue  Cardiac mass: Cardiac mass  Abnormal CT scan, lung: Abnormal CT scan, lung  Pulmonary HTN: Pulmonary HTN  Asthma: Asthma  Dyspnea: Dyspnea  Prophylactic measure: Prophylactic measure  Acquired hypothyroidism: Acquired hypothyroidism  Intermittent asthma, unspecified asthma severity, unspecified whether complicated: Intermittent asthma, unspecified asthma severity, unspecified whether complicated  Acute cystitis without hematuria: Acute cystitis without hematuria  Shortness of breath: Shortness of breath  Word finding difficulty: Word finding difficulty  Abnormal echocardiogram findings without diagnosis: Abnormal echocardiogram findings without diagnosis

## 2017-12-09 NOTE — CONSULT NOTE ADULT - PROBLEM SELECTOR RECOMMENDATION 7
stable now no wheeze but has O2 and nebs likely dyspnea rel to pl eff and pulm edema 2/2 Pulm HTN 2/2 cardiac mass

## 2017-12-09 NOTE — CONSULT NOTE ADULT - PROBLEM SELECTOR RECOMMENDATION 4
Elevated WBC count   Patient was receiving Oral prednisone for 7 days  CALLED PHARMACY PATIENT WAS RECEIVING A COURSE OF PREDNISONE 20MG FOR 2 DAYS THEN 10MG FOR 2 DAYS AND 5 MG FOR 3 DAYS  Received Stress Dose of steroids due to low blood pressure  Dr Trinidad Consulted
lifelong, couldn't complete MRI  might need sedation If MRI essential
bilat GGO and  RML nodule on ct chest  doubt pulmonary edema  r/o pna- check procalcitonin (doubt- already on invanz)  r/o ild from RA    -rml nodule - to be reviewed with radiology  check probnp, procalcitonin, rvp

## 2017-12-09 NOTE — PROGRESS NOTE ADULT - PROBLEM SELECTOR PLAN 2
Seen on AMAN-vegetation, tumor vs. thrombi  -Lovenox 1mg/kg BID  -Patient unable to complete cardiac MRI, d/w radiologist: Study is motion degraded, RA was not well visualized. They do not see any thrombi in RA on MRI

## 2017-12-09 NOTE — CONSULT NOTE ADULT - PROBLEM SELECTOR RECOMMENDATION 9
cardiac Mass, tumor v thrombus v infectious (?caseating?)  needs further lizama see cardio, possible MRI c sedation, possibly Bx eric jacobs Rx

## 2017-12-09 NOTE — PROGRESS NOTE ADULT - SUBJECTIVE AND OBJECTIVE BOX
Patient is a 77y old  Female who presents with a chief complaint of transfer for further management (06 Dec 2017 23:42)      SUBJECTIVE / OVERNIGHT EVENTS:  "i feel good today"  No chest pain. No shortness of breath. No complaints. No events overnight.     MEDICATIONS  (STANDING):  ALBUTerol/ipratropium for Nebulization 3 milliLiter(s) Nebulizer every 6 hours  aspirin 325 milliGRAM(s) Oral daily  atorvastatin 40 milliGRAM(s) Oral at bedtime  buDESOnide   0.5 milliGRAM(s) Respule 0.5 milliGRAM(s) Inhalation every 12 hours  enoxaparin Injectable 60 milliGRAM(s) SubCutaneous every 12 hours  ertapenem  IVPB 1000 milliGRAM(s) IV Intermittent every 24 hours  furosemide    Tablet 40 milliGRAM(s) Oral daily  levothyroxine 50 MICROGram(s) Oral daily  loratadine 10 milliGRAM(s) Oral daily  metoprolol     tartrate 12.5 milliGRAM(s) Oral two times a day  pantoprazole    Tablet 40 milliGRAM(s) Oral before breakfast    MEDICATIONS  (PRN):  acetaminophen   Tablet 650 milliGRAM(s) Oral every 6 hours PRN For Temp greater than 38.5 C (101.3 F)  acetaminophen   Tablet. 650 milliGRAM(s) Oral every 6 hours PRN Mild Pain (1 - 3)  ALBUTerol    90 MICROgram(s) HFA Inhaler 2 Puff(s) Inhalation every 6 hours PRN Shortness of Breath and/or Wheezing      Vital Signs Last 24 Hrs  T(C): 36.4 (09 Dec 2017 04:56), Max: 36.6 (08 Dec 2017 14:06)  T(F): 97.5 (09 Dec 2017 04:56), Max: 97.8 (08 Dec 2017 14:06)  HR: 97 (09 Dec 2017 04:56) (97 - 122)  BP: 148/93 (09 Dec 2017 04:56) (109/64 - 148/93)  BP(mean): --  RR: 18 (09 Dec 2017 04:56) (18 - 18)  SpO2: 100% (09 Dec 2017 04:56) (97% - 100%)  CAPILLARY BLOOD GLUCOSE        I&O's Summary    08 Dec 2017 07:01  -  09 Dec 2017 07:00  --------------------------------------------------------  IN: 710 mL / OUT: 400 mL / NET: 310 mL    09 Dec 2017 07:01  -  09 Dec 2017 10:26  --------------------------------------------------------  IN: 240 mL / OUT: 0 mL / NET: 240 mL        PHYSICAL EXAM:  GENERAL: NAD, well-developed  HEAD:  Atraumatic, Normocephalic  EYES: EOMI, PERRLA, conjunctiva and sclera clear  NECK: Supple, No JVD  CHEST/LUNG: Clear to auscultation bilaterally; No wheeze  HEART: Regular rate and rhythm; No murmurs, rubs, or gallops  ABDOMEN: Soft, Nontender, Nondistended; Bowel sounds present  EXTREMITIES:  2+ Peripheral Pulses, No clubbing, cyanosis, or edema  PSYCH: AAOx3  NEUROLOGY: non-focal  SKIN: No rashes or lesions    LABS:                        10.3   18.2  )-----------( 602      ( 08 Dec 2017 09:18 )             31.0     12-08    138  |  104  |  17  ----------------------------<  165<H>  4.3   |  19<L>  |  0.78    Ca    8.2<L>      08 Dec 2017 09:18  Phos  3.7     12-09  Mg     2.3     12-09      PTT - ( 08 Dec 2017 09:20 )  PTT:34.9 sec          RADIOLOGY & ADDITIONAL TESTS:    Imaging Personally Reviewed:    Consultant(s) Notes Reviewed:      Care Discussed with Consultants/Other Providers:

## 2017-12-09 NOTE — PROGRESS NOTE ADULT - SUBJECTIVE AND OBJECTIVE BOX
CC: Cardiac mass with SOB    Interval History: No significant cardiac events overnight.     MEDICATIONS:  acetaminophen   Tablet 650 milliGRAM(s) Oral every 6 hours PRN  acetaminophen   Tablet. 650 milliGRAM(s) Oral every 6 hours PRN  ALBUTerol    90 MICROgram(s) HFA Inhaler 2 Puff(s) Inhalation every 6 hours PRN  ALBUTerol/ipratropium for Nebulization 3 milliLiter(s) Nebulizer every 6 hours  aspirin 325 milliGRAM(s) Oral daily  atorvastatin 40 milliGRAM(s) Oral at bedtime  buDESOnide   0.5 milliGRAM(s) Respule 0.5 milliGRAM(s) Inhalation every 12 hours  enoxaparin Injectable 60 milliGRAM(s) SubCutaneous every 12 hours  ertapenem  IVPB 1000 milliGRAM(s) IV Intermittent every 24 hours  furosemide    Tablet 40 milliGRAM(s) Oral daily  levothyroxine 50 MICROGram(s) Oral daily  loratadine 10 milliGRAM(s) Oral daily  pantoprazole    Tablet 40 milliGRAM(s) Oral before breakfast      LABS:  12-08    138  |  104  |  17  ----------------------------<  165<H>  4.3   |  19<L>  |  0.78    Ca    8.2<L>      08 Dec 2017 09:18                            10.3   18.2  )-----------( 602      ( 08 Dec 2017 09:18 )             31.0     PTT - ( 08 Dec 2017 09:20 )  PTT:34.9 sec          VITAL SIGNS:   T(C): 36.4 (12-09-17 @ 04:56), Max: 36.6 (12-08-17 @ 14:06)  HR: 97 (12-09-17 @ 04:56) (97 - 122)  BP: 148/93 (12-09-17 @ 04:56) (109/64 - 148/93)  RR: 18 (12-09-17 @ 04:56) (18 - 18)  SpO2: 100% (12-09-17 @ 04:56) (97% - 100%)  Daily     Daily   I&O's Summary    08 Dec 2017 07:01  -  09 Dec 2017 07:00  --------------------------------------------------------  IN: 710 mL / OUT: 400 mL / NET: 310 mL        TELE: 7 sec PAT

## 2017-12-09 NOTE — PROGRESS NOTE ADULT - ASSESSMENT
77 F with pmh of Asthma, hld, migraines and UTI recently treated with PO antibiotics presented to ED with generalized fatigue and malaise from last 3 days. Patient states that she recently had a ED visit and found to have UTI, was sent home on PO antibiotics after iv hydration. Patient had visit to PCP for R shoulder pain and was treated with PO prednisone.   Admitted to the floor for Encephalopathy R/O Stroke vs Infection. Echo- done shows myxoma vs endocarditis - tr. to [facility] from [facility] for further cardiac work-up    Hospital course:  12/7/17 - AMAN done-as per Dr. Landaverde pt will   	need a Cardiac MRI to evaluate for cardiac tumor  Hx ESBL UTI - e. coli-tx'd w/ IV Enzoz - Dr. Monson - ID consult appreciated  Head CT shows multiple small infarcts in brain - neuro consult called ()  MRI/MRA head & Neck ordered to assess for Mycotic Aneurysm as per Neuro  Pulmonary consult called -Dr.Mitch Nguyen - chest ct done 12/4/17 -hx asthma  Cardiology consulted d- Dr. Sorin Christensen for management    Plan - R/O cardiac thrombus vs tumor - treatment plan will depend on result of Cardiac MRI      brief PAT - start metoprolol 12.5 bid.

## 2017-12-10 ENCOUNTER — TRANSCRIPTION ENCOUNTER (OUTPATIENT)
Age: 77
End: 2017-12-10

## 2017-12-10 LAB
CORTICOSTEROID BINDING GLOBULIN RESULT: 2.5 MG/DL — SIGNIFICANT CHANGE UP
CORTIS F/TOTAL MFR SERPL: 15 % — SIGNIFICANT CHANGE UP
CORTIS SERPL-MCNC: 18 UG/DL — SIGNIFICANT CHANGE UP
CORTISOL, FREE RESULT: 2.8 UG/DL — SIGNIFICANT CHANGE UP

## 2017-12-10 PROCEDURE — 99232 SBSQ HOSP IP/OBS MODERATE 35: CPT

## 2017-12-10 RX ADMIN — Medication 3 MILLILITER(S): at 17:21

## 2017-12-10 RX ADMIN — Medication 0.5 MILLIGRAM(S): at 05:43

## 2017-12-10 RX ADMIN — Medication 3 MILLILITER(S): at 00:28

## 2017-12-10 RX ADMIN — Medication 3 MILLILITER(S): at 23:01

## 2017-12-10 RX ADMIN — ERTAPENEM SODIUM 120 MILLIGRAM(S): 1 INJECTION, POWDER, LYOPHILIZED, FOR SOLUTION INTRAMUSCULAR; INTRAVENOUS at 23:00

## 2017-12-10 RX ADMIN — Medication 12.5 MILLIGRAM(S): at 17:26

## 2017-12-10 RX ADMIN — Medication 50 MICROGRAM(S): at 05:44

## 2017-12-10 RX ADMIN — Medication 3 MILLILITER(S): at 12:21

## 2017-12-10 RX ADMIN — ATORVASTATIN CALCIUM 40 MILLIGRAM(S): 80 TABLET, FILM COATED ORAL at 23:00

## 2017-12-10 RX ADMIN — Medication 40 MILLIGRAM(S): at 05:44

## 2017-12-10 RX ADMIN — LORATADINE 10 MILLIGRAM(S): 10 TABLET ORAL at 12:21

## 2017-12-10 RX ADMIN — PANTOPRAZOLE SODIUM 40 MILLIGRAM(S): 20 TABLET, DELAYED RELEASE ORAL at 05:44

## 2017-12-10 RX ADMIN — Medication 3 MILLILITER(S): at 05:43

## 2017-12-10 RX ADMIN — Medication 0.5 MILLIGRAM(S): at 17:25

## 2017-12-10 RX ADMIN — ENOXAPARIN SODIUM 60 MILLIGRAM(S): 100 INJECTION SUBCUTANEOUS at 05:44

## 2017-12-10 RX ADMIN — ENOXAPARIN SODIUM 60 MILLIGRAM(S): 100 INJECTION SUBCUTANEOUS at 17:26

## 2017-12-10 RX ADMIN — Medication 12.5 MILLIGRAM(S): at 05:43

## 2017-12-10 RX ADMIN — Medication 325 MILLIGRAM(S): at 12:21

## 2017-12-10 NOTE — DISCHARGE NOTE ADULT - CARE PLAN
Principal Discharge DX:	CVA (cerebral vascular accident) Principal Discharge DX:	CVA (cerebral vascular accident)  Goal:	no residuals  Instructions for follow-up, activity and diet:	History of TIA, continue medications as ordered. TIA is a small transient stroke . A stroke is a brain attack that occurs when an artery or a blood vessel becomes occluded breaks, interrupting blood flow to an area of the brain cells begin to die. Please call [phone] for facial droop slurring speech, unable to move a limb or sudden weakness in one limb or one side of the body or confusion.  Secondary Diagnosis:	Cardiac mass  Instructions for follow-up, activity and diet:	c/w Lovenox 90 mg daily   PET scan as OP   please follow up with DR Graham and PCP with filomena week of discharge  Secondary Diagnosis:	Asthma  Instructions for follow-up, activity and diet:	c/w Brochodialtors  Secondary Diagnosis:	Rheumatoid arthritis  Instructions for follow-up, activity and diet:	Tylenol for pain  follow up with PCP with filomena week of discharge

## 2017-12-10 NOTE — PROGRESS NOTE ADULT - SUBJECTIVE AND OBJECTIVE BOX
CC: Cardiac mass.     Interval History: No significant cardiac events overnight.     MEDICATIONS:  acetaminophen   Tablet 650 milliGRAM(s) Oral every 6 hours PRN  acetaminophen   Tablet. 650 milliGRAM(s) Oral every 6 hours PRN  ALBUTerol    90 MICROgram(s) HFA Inhaler 2 Puff(s) Inhalation every 6 hours PRN  ALBUTerol/ipratropium for Nebulization 3 milliLiter(s) Nebulizer every 6 hours  aspirin 325 milliGRAM(s) Oral daily  atorvastatin 40 milliGRAM(s) Oral at bedtime  buDESOnide   0.5 milliGRAM(s) Respule 0.5 milliGRAM(s) Inhalation every 12 hours  enoxaparin Injectable 60 milliGRAM(s) SubCutaneous every 12 hours  ertapenem  IVPB 1000 milliGRAM(s) IV Intermittent every 24 hours  furosemide    Tablet 40 milliGRAM(s) Oral daily  levothyroxine 50 MICROGram(s) Oral daily  loratadine 10 milliGRAM(s) Oral daily  metoprolol     tartrate 12.5 milliGRAM(s) Oral two times a day  pantoprazole    Tablet 40 milliGRAM(s) Oral before breakfast      LABS:    Phos  3.7     12-09  Mg     2.3     12-09    VITAL SIGNS:   T(C): 36.3 (12-10-17 @ 04:56), Max: 36.7 (12-09-17 @ 14:04)  HR: 80 (12-10-17 @ 04:56) (67 - 106)  BP: 118/77 (12-10-17 @ 04:56) (101/60 - 118/77)  RR: 18 (12-10-17 @ 04:56) (18 - 19)  SpO2: 90% (12-10-17 @ 04:56) (90% - 100%)  Daily     Daily   I&O's Summary    09 Dec 2017 07:01  -  10 Dec 2017 07:00  --------------------------------------------------------  IN: 1370 mL / OUT: 750 mL / NET: 620 mL    10 Dec 2017 07:01  -  10 Dec 2017 09:41  --------------------------------------------------------  IN: 360 mL / OUT: 0 mL / NET: 360 mL        TELE: No significant ectopy

## 2017-12-10 NOTE — DISCHARGE NOTE ADULT - DURABLE MEDICAL EQUIPMENT AGENCY
Rolling walker delivered to bedside 12/14 by [facility] [employer] [phone].

## 2017-12-10 NOTE — DISCHARGE NOTE ADULT - PATIENT PORTAL LINK FT
“You can access the Follow[facility] Patient Portal, offered by [facility], by registering with the following website: [email or web address]”

## 2017-12-10 NOTE — PROGRESS NOTE ADULT - SUBJECTIVE AND OBJECTIVE BOX
Patient is a 77y old  Female who presents with a chief complaint of transfer for further management (10 Dec 2017 08:52)      SUBJECTIVE / OVERNIGHT EVENTS: No chest pain. No shortness of breath. No complaints. No events overnight.     MEDICATIONS  (STANDING):  ALBUTerol/ipratropium for Nebulization 3 milliLiter(s) Nebulizer every 6 hours  aspirin 325 milliGRAM(s) Oral daily  atorvastatin 40 milliGRAM(s) Oral at bedtime  buDESOnide   0.5 milliGRAM(s) Respule 0.5 milliGRAM(s) Inhalation every 12 hours  enoxaparin Injectable 60 milliGRAM(s) SubCutaneous every 12 hours  ertapenem  IVPB 1000 milliGRAM(s) IV Intermittent every 24 hours  furosemide    Tablet 40 milliGRAM(s) Oral daily  levothyroxine 50 MICROGram(s) Oral daily  loratadine 10 milliGRAM(s) Oral daily  metoprolol     tartrate 12.5 milliGRAM(s) Oral two times a day  pantoprazole    Tablet 40 milliGRAM(s) Oral before breakfast    MEDICATIONS  (PRN):  acetaminophen   Tablet 650 milliGRAM(s) Oral every 6 hours PRN For Temp greater than 38.5 C (101.3 F)  acetaminophen   Tablet. 650 milliGRAM(s) Oral every 6 hours PRN Mild Pain (1 - 3)  ALBUTerol    90 MICROgram(s) HFA Inhaler 2 Puff(s) Inhalation every 6 hours PRN Shortness of Breath and/or Wheezing      Vital Signs Last 24 Hrs  T(C): 36.3 (10 Dec 2017 04:56), Max: 36.7 (09 Dec 2017 14:04)  T(F): 97.4 (10 Dec 2017 04:56), Max: 98.1 (09 Dec 2017 14:04)  HR: 80 (10 Dec 2017 04:56) (67 - 106)  BP: 118/77 (10 Dec 2017 04:56) (101/60 - 118/77)  BP(mean): --  RR: 18 (10 Dec 2017 04:56) (18 - 19)  SpO2: 90% (10 Dec 2017 04:56) (90% - 100%)  CAPILLARY BLOOD GLUCOSE        I&O's Summary    09 Dec 2017 07:01  -  10 Dec 2017 07:00  --------------------------------------------------------  IN: 1370 mL / OUT: 750 mL / NET: 620 mL    10 Dec 2017 07:01  -  10 Dec 2017 11:55  --------------------------------------------------------  IN: 360 mL / OUT: 0 mL / NET: 360 mL        PHYSICAL EXAM:  GENERAL: NAD, well-developed  HEAD:  Atraumatic, Normocephalic  EYES: EOMI, PERRLA, conjunctiva and sclera clear  NECK: Supple, No JVD  CHEST/LUNG: Clear to auscultation bilaterally; No wheeze  HEART: Regular rate and rhythm; No murmurs, rubs, or gallops  ABDOMEN: Soft, Nontender, Nondistended; Bowel sounds present  EXTREMITIES:  2+ Peripheral Pulses, No clubbing, cyanosis, or edema  PSYCH: AAOx3  NEUROLOGY: non-focal  SKIN: No rashes or lesions    LABS:      Phos  3.7     12-09  Mg     2.3     12-09                RADIOLOGY & ADDITIONAL TESTS:    Imaging Personally Reviewed:    Consultant(s) Notes Reviewed:      Care Discussed with Consultants/Other Providers:

## 2017-12-10 NOTE — DISCHARGE NOTE ADULT - HOSPITAL COURSE
76 yo F with RA, Asthma, pulmonary HTN, HLD who presented to [facility] with weakness and dyspnea. Found to have large LV mass, transferred to [facility] for further mgmt. Further findings of acute/subacute CVA concerning for cardioembolic source, CT chest with pulmonary edema. ESBL E.Coli UTI .  completed a course of abx with Ertapenem . AMAN with Severe mitral stenosis , Large LV mass and RA with linear mobile echo densities. Pt underwent cardiac MRI , But the test was inconclusive  since Pt could not tolerate the study. So a decision was made to after d/w CTS and cardiology to  discharge Pt with Lovenox 90 mg daily and PET scan as OP.   Hematology consulted for Hypercoagulable work up . Labs send out . Pt instructed to follow up with DR Graham .

## 2017-12-10 NOTE — DISCHARGE NOTE ADULT - MEDICATION SUMMARY - MEDICATIONS TO STOP TAKING
I will STOP taking the medications listed below when I get home from the hospital:    heparin  -- 5000 milligram(s) subcutaneous 2 times a day    cefTRIAXone  -- 1000 milligram(s) intravenous once a day    hydrocortisone  -- 40 milligram(s) intravenous every 8 hours

## 2017-12-10 NOTE — PROGRESS NOTE ADULT - ASSESSMENT
76 yo F with RA, Asthma, pulmonary HTN, HLD who presented to [facility] with weakness and dyspnea. Found to have large LV mass, transferred to [facility] for further mgmt. Further findings of acute/subacute CVA concerning for cardioembolic source, CT chest with pulmonary edema. ESBL E.Coli UTI. Severe mitral stenosis 2nd mass. RA with mobile echodensities.

## 2017-12-10 NOTE — PROGRESS NOTE ADULT - SUBJECTIVE AND OBJECTIVE BOX
SUBJECTIVE: No new neurologic events overnight.  No new neurologic complaints.   Daughter at bedside  Planned for repeat cardiac MRI with sedation    Medications:  acetaminophen   Tablet 650 milliGRAM(s) Oral every 6 hours PRN  acetaminophen   Tablet. 650 milliGRAM(s) Oral every 6 hours PRN  ALBUTerol    90 MICROgram(s) HFA Inhaler 2 Puff(s) Inhalation every 6 hours PRN  ALBUTerol/ipratropium for Nebulization 3 milliLiter(s) Nebulizer every 6 hours  aspirin 325 milliGRAM(s) Oral daily  atorvastatin 40 milliGRAM(s) Oral at bedtime  buDESOnide   0.5 milliGRAM(s) Respule 0.5 milliGRAM(s) Inhalation every 12 hours  enoxaparin Injectable 60 milliGRAM(s) SubCutaneous every 12 hours  ertapenem  IVPB 1000 milliGRAM(s) IV Intermittent every 24 hours  furosemide    Tablet 40 milliGRAM(s) Oral daily  levothyroxine 50 MICROGram(s) Oral daily  loratadine 10 milliGRAM(s) Oral daily  metoprolol     tartrate 12.5 milliGRAM(s) Oral two times a day  pantoprazole    Tablet 40 milliGRAM(s) Oral before breakfast    Labs:    Phos  3.7     12-09  Mg     2.3     12-09    Vitals:  Vital Signs Last 24 Hrs  T(C): 36.6 (10 Dec 2017 13:59), Max: 36.6 (10 Dec 2017 13:59)  T(F): 97.9 (10 Dec 2017 13:59), Max: 97.9 (10 Dec 2017 13:59)  HR: 99 (10 Dec 2017 13:59) (80 - 106)  BP: 103/69 (10 Dec 2017 13:59) (101/60 - 118/77)  BP(mean): --  RR: 18 (10 Dec 2017 13:59) (18 - 19)  SpO2: 92% (10 Dec 2017 13:59) (90% - 92%)    NEUROLOGICAL EXAM:    Mental status: Awake, alert, and in no apparent distress. Oriented x 3. Language function is normal. Recent memory, digit span and concentration were normal.     Cranial Nerves: Pupils were equal, round, reactive to light. Extraocular movements were intact. Visual field were full. Fundoscopic exam was deferred. Facial sensation was intact to light touch. There was no facial asymmetry. The palate was upgoing symmetrically and tongue was midline. Hearing acuity was intact to finger rub AU. Shoulder shrug was full bilaterally    Motor exam: Bulk and tone were normal. Strength was 5/5 in all four extremities. Fine finger movements were symmetric and normal. There was no pronator drift    Reflexes: DTRs normoactive in all extremities. Toes were downgoing bilaterally.     Sensation: Intact to light touch, temperature.     Coordination: Finger-nose-finger without dysmetria.     Gait: deferred    NIHSS=0.    < from: CT Angio Chest w/ IV Cont (12.07.17 @ 21:52) >  INTERPRETATION:  no pulmonary embolism. redemonstrated interlobular   septal wall thickening and ground glass opacities, likely representing   pulmonary edema. denser airspace opacity in right lung apex is new and   may represent pulmonary edema, however infection cannot be entirely   excluded. small bilateral pleural effusions, increased since 12/4/17.    < from: MR Head No Cont (12.06.17 @ 15:07) >  FINDINGS:  There are numerous small foci of diffusion restriction, some with   associated T2/FLAIR hyperintensity, bilaterally in the bilateral frontal,   parietal, occipital, left temporal lobes, and bilateral cerebellar   hemispheres, compatible with acute infarcts.    The ventricles and cortical sulci are within normal limits for age. No   intracranial hemorrhage, mass effect or midline shift. The basal cisterns   are patent.    Scattered foci of T2/FLAIR hyperintensity are noted in the   periventricular white matter, nonspecific but likely sequela of small   vessel ischemic disease.    The major intracranial flow voids at the skull base are preserved. The   paranasal sinuses and left mastoid air cells are well aerated. Scattered   fluid signal in the right mastoid air cells.    IMPRESSION:  Numerous small acute infarcts bilaterally in the supratentorial and   infratentorial compartments; consider central embolic phenomenon.      < from: CT Chest No Cont (12.04.17 @ 14:48) >    FINDINGS:    CHEST:     LUNGS AND LARGE AIRWAYS: Patent central airways.  Mild bilateral   interlobular septal thickening and groundglass opacity, consistent with   interstitial pulmonary edema. No pulmonary consolidation.  PLEURA: Trace bilateral pleural effusions.  VESSELS: Within normal limits.  HEART: Heart size is normal. No pericardial effusion.  MEDIASTINUM AND BIRD: No lymphadenopathy.  CHEST WALL AND LOWER NECK: Bilateral peripherally calcified breast   implants.  VISUALIZED UPPER ABDOMEN: Within normal limits.  BONES: Mild degenerative changes.    IMPRESSION: Mild interstitial pulmonary edema and trace bilateral pleural   effusions.    < from: CT Head No Cont (12.04.17 @ 14:42) >  Impression: No acute intracranial hemorrhage.     Small age indeterminate territorial infarct in the right high frontal   lobe. If clinically indicated, brain MR may be pursued for further   evaluation.    Mild chronic small vessel ischemic disease.    < end of copied text >  < from: AMAN w/TTE (w/Cont, w/3D Echo) (12.07.17 @ 12:39) >  Conclusions:  1. Mild mitral annular calcification. There is a large mass  (measuring approximately 2.1 cm x 1.8 cm) in the left  ventricle ) that extends into the subvalvular apparatus and  involves the ventricular surface of both leaflets of the  mitral valve causing significant obstruction of mitral  inflow. The mass likely represents tumor. Mild mitral  regurgitation. Peak mitral valve gradient equals 19 mm Hg,  mean transmitral valve gradient equals 12-13 mm Hg,  consistent with severe mitral stenosis. (HRabout 80s-90s  bpm) Mean gradient about 24 mmHg with HRabout 100-110 bpm  during TTE.  2. Calcified trileaflet aortic valve with normal opening.  Minimal aortic regurgitation.  3. Normal aortic root and ascending aorta. (Ao: 2.7 cm at  the sinuses of Valsalva). Simple, nonmobile atheroma noted  in the aortic arch and descending aorta.  4. Normal left atrium.  LA volume index = 18 cc/m2. No left  atrial or left atrial appendage thrombus. Normal left  atrial appendage function (velocity> 40 cm/s).  5. Hyperdynamic left ventricular systolic function. No  evidence of LVOT obstruction.  6. Normal diastolic function  7. Mild right atrial enlargement. Linear, mobile  echodensities are seen arising by the right atrial  appendage could represent thrombi, tumor, or vegetation.  8. Right ventricular enlargement with normal right  ventricular systolic function.  9. Agitated saline injection and color flow doppler  demonstrate no evidence of a patent foramen ovale.  10. Trace pericardialeffusion.  11. Bilateral pleural effusions.  Consider cardiac MRI for tissue characterization of the  large LV mass and the linear, mobile echodensities in the  right atrium.  *** No previous Echo exam.    < from: MR Cardiac No Cont (12.08.17 @ 11:04) >    IMPRESSION:   CMR terminated prior to completion of the study due to patient   preference; degraded image quality due to respiratory motion.  Limited   CMR assessment.  1.  A nodular mass with an irregular contour extends the perimeter of   posterior mitral valve leaflet measuring approximately 27 mm x 10 mm.    Posterior mitral valve leaflet excursion appears decreased secondary to   the associated mass resulting in turbulent mitral inflow.  The   differential diagnosis includes rheumatoid nodules with overlying   thrombus, superimposed infective endocarditis and tumor.  2.  Right ventricle appears qualitatively dilated and hypokinetic.  The   interventricular septum flattens in early diastole consistent with right   ventricular volume overload.  3.  Small to moderate bilateral pleural effusions.

## 2017-12-10 NOTE — PROGRESS NOTE ADULT - SUBJECTIVE AND OBJECTIVE BOX
Follow-up Pulm Progress Note    No new respiratory events overnight.  Denies SOB/CP.   97% on RA    Medications:  MEDICATIONS  (STANDING):  ALBUTerol/ipratropium for Nebulization 3 milliLiter(s) Nebulizer every 6 hours  aspirin 325 milliGRAM(s) Oral daily  atorvastatin 40 milliGRAM(s) Oral at bedtime  buDESOnide   0.5 milliGRAM(s) Respule 0.5 milliGRAM(s) Inhalation every 12 hours  enoxaparin Injectable 60 milliGRAM(s) SubCutaneous every 12 hours  ertapenem  IVPB 1000 milliGRAM(s) IV Intermittent every 24 hours  furosemide    Tablet 40 milliGRAM(s) Oral daily  levothyroxine 50 MICROGram(s) Oral daily  loratadine 10 milliGRAM(s) Oral daily  metoprolol     tartrate 12.5 milliGRAM(s) Oral two times a day  pantoprazole    Tablet 40 milliGRAM(s) Oral before breakfast    MEDICATIONS  (PRN):  acetaminophen   Tablet 650 milliGRAM(s) Oral every 6 hours PRN For Temp greater than 38.5 C (101.3 F)  acetaminophen   Tablet. 650 milliGRAM(s) Oral every 6 hours PRN Mild Pain (1 - 3)  ALBUTerol    90 MICROgram(s) HFA Inhaler 2 Puff(s) Inhalation every 6 hours PRN Shortness of Breath and/or Wheezing          Vital Signs Last 24 Hrs  T(C): 36.3 (10 Dec 2017 04:56), Max: 36.7 (09 Dec 2017 14:04)  T(F): 97.4 (10 Dec 2017 04:56), Max: 98.1 (09 Dec 2017 14:04)  HR: 80 (10 Dec 2017 04:56) (67 - 106)  BP: 118/77 (10 Dec 2017 04:56) (101/60 - 118/77)  BP(mean): --  RR: 18 (10 Dec 2017 04:56) (18 - 19)  SpO2: 90% (10 Dec 2017 04:56) (90% - 100%) on RA          12-09 @ 07:01  -  12-10 @ 07:00  --------------------------------------------------------  IN: 1370 mL / OUT: 750 mL / NET: 620 mL          LABS:      Phos  3.7     12-09  Mg     2.3     12-09            CAPILLARY BLOOD GLUCOSE            Procalcitonin, Serum: 0.41 ng/mL (12-08-17 @ 09:18)    Serum Pro-Brain Natriuretic Peptide: 97635 pg/mL (12-08-17 @ 09:18)                CULTURES: (if applicable)  Culture Results:   10,000 - 49,000 CFU/mL Escherichia coli ESBL (12-04 @ 23:14)  Culture Results:   No growth at 5 days. (12-04 @ 18:28)  Culture Results:   No growth at 5 days. (12-04 @ 18:27)          Physical Examination:  PULM: Decreased BS at bases  CVS: S1, S2 RRR    RADIOLOGY REVIEWED  CTA chest: < from: CT Angio Chest w/ IV Cont (12.07.17 @ 21:52) >    CHEST:     LUNGS AND LARGE AIRWAYS: Patent central airways. Diffuse interlobular   septal thickening and groundglass opacities, consistent with pulmonary   edema. New patchy right apical opacities. Right middlelobe and bilateral   lower lobe subsegmental atelectasis.  PLEURA: Small to moderate bilateral pleural effusions.  VESSELS: No abnormal filling defects to suggest pulmonary embolism.   Reflux of contrast within the hepatic veins. Atherosclerotic changes of   the aorta.  HEART: Approximately 2.0 x 1.7 cm mass lesion within the left ventricle.   Heart size is normal.No pericardial effusion.  MEDIASTINUM AND BIRD: Conglomerate lymphadenopathy within the   mediastinum.   CHEST WALL AND LOWER NECK: Peripherally calcified bilateral breast   implants.  VISUALIZED UPPER ABDOMEN: Within normal limits.  BONES: Degenerative changes.    IMPRESSION:     No pulmonary embolism.    2.0 cm mass lesion within the left ventricle adjacent to the posterior   mitral leaflet. Given its location, they may be causing poor mitral   inflow and resultant pulmonary edema. A cardiac MRI is pending .    Small bilateral pleural effusions.      < end of copied text >

## 2017-12-10 NOTE — PROGRESS NOTE ADULT - ASSESSMENT
77 F with cardiac mass  ·	No further PAT. Tolerating low dose beta blocker.   ·	Continue present therapy.   ·	Will try to repeat MRI with sedation if this is an option. Will need to better characterize lesion.  ·

## 2017-12-10 NOTE — DISCHARGE NOTE ADULT - PLAN OF CARE
no residuals History of TIA, continue medications as ordered. TIA is a small transient stroke . A stroke is a brain attack that occurs when an artery or a blood vessel becomes occluded breaks, interrupting blood flow to an area of the brain cells begin to die. Please call [phone] for facial droop slurring speech, unable to move a limb or sudden weakness in one limb or one side of the body or confusion. c/w Lovenox 90 mg daily   PET scan as OP   please follow up with DR Graham and PCP with filomena week of discharge c/w Brochodialtors Tylenol for pain  follow up with PCP with filomena week of discharge

## 2017-12-10 NOTE — DISCHARGE NOTE ADULT - CARE PROVIDERS DIRECT ADDRESSES
[email or web address],DirectAddress_Unknown,DirectAddress_Unknown,DirectAddress_Unknown

## 2017-12-10 NOTE — PROGRESS NOTE ADULT - SUBJECTIVE AND OBJECTIVE BOX
HPI:From adm H+P:  78 yo F with rheumatoid arthritis, asthma, pulmomary HTN, HLD who presented to [facility] with weakness and dyspnea. The patient endorses that she was in her usual state of health until a few weeks ago when she developed back and shoulder pain after moving furniture was treated with prednisone.  Her symptoms did not resolve and received treatment with prednisone.  She was persistently feeling unwell and presented to the ER where she received hydration and was treated for a UTI and released.   She became concerned as she is typically very active and spends her summer at a swim club and can typically ascend stairs without dyspnea.  She returned to the emergency room at [facility] and underewent a TTE revealed an echodensity noted at the base of the posterior wall of the LV and attached to LV septum of unclear etiology.    Transferred to [facility] for further management.  Consults at [facility]:   Pulmonary  ID  Cardiology (06 Dec 2017 23:42)      Interval Events  off O2 nasal canula  ok overnite, no change , no co   no new labs  ambulated c daughter   discussed c Dr BUSTAMANTE, Dr PENA  needs MRI, more definitive Dx    MEDICATIONS  (STANDING):  ALBUTerol/ipratropium for Nebulization 3 milliLiter(s) Nebulizer every 6 hours  aspirin 325 milliGRAM(s) Oral daily  atorvastatin 40 milliGRAM(s) Oral at bedtime  buDESOnide   0.5 milliGRAM(s) Respule 0.5 milliGRAM(s) Inhalation every 12 hours  enoxaparin Injectable 60 milliGRAM(s) SubCutaneous every 12 hours  ertapenem  IVPB 1000 milliGRAM(s) IV Intermittent every 24 hours  furosemide    Tablet 40 milliGRAM(s) Oral daily  levothyroxine 50 MICROGram(s) Oral daily  loratadine 10 milliGRAM(s) Oral daily  metoprolol     tartrate 12.5 milliGRAM(s) Oral two times a day  pantoprazole    Tablet 40 milliGRAM(s) Oral before breakfast    MEDICATIONS  (PRN):  acetaminophen   Tablet 650 milliGRAM(s) Oral every 6 hours PRN For Temp greater than 38.5 C (101.3 F)  acetaminophen   Tablet. 650 milliGRAM(s) Oral every 6 hours PRN Mild Pain (1 - 3)  ALBUTerol    90 MICROgram(s) HFA Inhaler 2 Puff(s) Inhalation every 6 hours PRN Shortness of Breath and/or Wheezing      Patient is a 77y old  Female who presents with a chief complaint of transfer for further management (10 Dec 2017 08:52)      REVIEW OF SYSTEMS    General: nad, no co, no HaA no dizzynes no change V/H  Skin/Breast:  Ophthalmologic: no change  ENMT:	  Respiratory and Thorax: no cough no sputum no sob  Cardiovascular: no cp palp   Gastrointestinal: no nvcd   Genitourinary:no fdi  Musculoskeletal:	no pain  Neurological:	  Psychiatric:	  Hematology/Lymphatics:	  Endocrine:	  Allergic/Immunologic:  AOSN	+ slept ok       Vital Signs Last 24 Hrs  T(C): 36.3 (10 Dec 2017 04:56), Max: 36.7 (09 Dec 2017 14:04)  T(F): 97.4 (10 Dec 2017 04:56), Max: 98.1 (09 Dec 2017 14:04)  HR: 80 (10 Dec 2017 04:56) (67 - 106)  BP: 118/77 (10 Dec 2017 04:56) (101/60 - 118/77)  BP(mean): --  RR: 18 (10 Dec 2017 04:56) (18 - 19)  SpO2: 90% (10 Dec 2017 04:56) (90% - 100%)    PHYSICAL EXAM:    Constitutional:  vssa nad, 10am sitting in dark eyes covered (to relax) ate breakfast but thought it was nite, recognized me, rembered my name, assoc c Dr Degroot  H+N ncat  Eyes: bebe cwnl  ENMT:   Neck:  Breasts:  Back:  Respiratory: ctaBL after deep breaths  Cardiovascular: rrr  Gastrointestinal: bs+ soft  nt  Genitourinary:  Rectal:  Extremities: nocce  Vascular:  Neurological: no changes  Skin:  Lymph Nodes:  Musculoskeletal:  Psychiatric:    LABS        Phos  3.7     12-09  Mg     2.3     12-09            Imaging:    Xray:    Echo:    CT:    MRI:    Tele:    Orders:    MIRI Berrios [phone]

## 2017-12-10 NOTE — PROGRESS NOTE ADULT - ASSESSMENT
Disc c Dr Eubanks, cardio re problem, DDx andfurther lizama. MRI seems most helpful if pt can tolerate it w/wo sedation as needed per Radiology  Disc c DR Middleton and sergio  reviewed notes  needs to be oob and ambulate, PT was ordered

## 2017-12-10 NOTE — PROGRESS NOTE ADULT - PROBLEM SELECTOR PLAN 1
2nd severe mitral inflow obstruction 2nd LV mass   -Diuresis with Lasix as tolerated   -Dyspnea improved with diuresis   -Duoneb q6  -Check CXR in AM likely 2nd severe mitral inflow obstruction 2nd LV mass   -Diuresis with Lasix as tolerated   -Dyspnea improved with diuresis   -Duoneb q6  -Check CXR in AM

## 2017-12-10 NOTE — PROGRESS NOTE ADULT - PROBLEM SELECTOR PLAN 2
15 year Hx, was on methtrexate, dcd 2/2 lung scarring per pt (pulm fibrosis?pneumonitis?)  I will speak c Dr Degroot tomorrow to try to clarify

## 2017-12-10 NOTE — PROGRESS NOTE ADULT - ASSESSMENT
77 F with pmh of Asthma, hld, migraines and UTI recently treated with PO antibiotics presented to ED with generalized fatigue and malaise from last 3 days. Patient states that she recently had a ED visit and found to have UTI, was sent home on PO antibiotics after iv hydration. Patient had visit to PCP for R shoulder pain and was treated with PO prednisone.   Admitted to the floor for Encephalopathy R/O Stroke vs Infection. Echo- done shows myxoma vs endocarditis - tr. to [facility] from [facility] for further cardiac work-up    Hospital course:  12/7/17 - AMAN done-as per Dr. Landaverde pt will   	need a Cardiac MRI to evaluate for cardiac tumor  Hx ESBL UTI - e. coli-tx'd w/ IV Enzoz - Dr. Monson - ID consult appreciated  Head CT shows multiple small infarcts in brain - neuro consult called ()  MRI/MRA head & Neck ordered to assess for Mycotic Aneurysm as per Neuro  Pulmonary consult called -Dr.Mitch Nguyen - chest ct done 12/4/17 -hx asthma  Cardiology consulted d- Dr. Sorin Christensen for management    Plan - R/O cardiac thrombus vs tumor - treatment plan will depend on result of Cardiac MRI      brief PAT - start metoprolol 12.5 bid.

## 2017-12-10 NOTE — DISCHARGE NOTE ADULT - CARE PROVIDER_API CALL
Sorin Eubanks), Cardiovascular Disease; Internal Medicine; Nuclear Cardiology  [address] [place] [address]  Phone: [phone]  Fax: [phone]    Gee Graham (MD), Hematology; Medical Oncology  [address]  [place] [address]  Phone: [phone]  Fax: [phone]    Waldemar Resendiz (DO), Critical Care Medicine; Internal Medicine; Pulmonary Disease  [address]  [place] [address]  Phone: [phone]  Fax: [phone]    Agueda Holguin), Neurology  [address]  [place] [address]  Phone: [phone]  Fax: [phone]

## 2017-12-10 NOTE — PROGRESS NOTE ADULT - ASSESSMENT
A/P: 77 year old woman with pmhx RA, migraines, HLD, hypothyroidism transferred from OSH after mass identified on echocardiogram, confirmed on AMAN at [facility] with large mass L ventricle with mitral obstruction concerning for tumor, and right atrial appendage mobile echodensities. CT head performed which suggested right frontal infarcts. MRI brain performed yesterday disclosed multiple tiny scattered supratentorial, right greater than left and infratentorial, tiny cerebellar, infarcts concerning for embolic etiology. CT chest no PE, right apical opacity, bilateral pleural effusions. Concern for underlining malignancy. Awaiting cardiac MRI. Neurological examination nonlateralizing. NIHSS=0.    Etiology of infarcts likely cardioembolic, in setting of r/o cardiac tumor r/o endocarditis, possible hypercoaguability of cancer    Cardiac MRI  A nodular mass with an irregular contour extends the perimeter of   posterior mitral valve leaflet measuring approximately 27 mm x 10 mm.    Posterior mitral valve leaflet excursion appears decreased secondary to   the associated mass resulting in turbulent mitral inflow.  The   differential diagnosis includes rheumatoid nodules with overlying   thrombus, superimposed infective endocarditis and tumor.    Neuro stable    Plan:  Started on AC with Lovenox as per cardiology  Await repeat cardiac MRI  Further management of cardiac mass as per cardiology/CTSurg  Continue Neurochecks  Follow up blood cultures  Continue Abx for UTI  Heme/Onc following  Close observation  Will follow    Plan reviewed with pt and daughter at bedside

## 2017-12-10 NOTE — DISCHARGE NOTE ADULT - MEDICATION SUMMARY - MEDICATIONS TO TAKE
I will START or STAY ON the medications listed below when I get home from the hospital:    aspirin 325 mg oral tablet  -- 1 tab(s) by mouth once a day  -- Indication: For Antipltlet    enoxaparin 100 mg/mL injectable solution  -- 90 milligram(s) injectable once a day   -- It is very important that you take or use this exactly as directed.  Do not skip doses or discontinue unless directed by your doctor.    -- Indication: For blood thinner    ZyrTEC 10 mg oral tablet, chewable  -- 1 tab(s) by mouth once a day  -- It is very important that you take or use this exactly as directed.  Do not skip doses or discontinue unless directed by your doctor.  May cause drowsiness.  Alcohol may intensify this effect.  Use care when operating dangerous machinery.  Obtain medical advice before taking any non-prescription drugs as some may affect the action of this medication.    -- Indication: For Allergies    Lipitor 40 mg oral tablet  -- 1 tab(s) by mouth once a day  -- Indication: For HLD (hyperlipidemia)    metoprolol tartrate 25 mg oral tablet  -- 0.5 tab(s) by mouth 2 times a day   -- It is very important that you take or use this exactly as directed.  Do not skip doses or discontinue unless directed by your doctor.  May cause drowsiness.  Alcohol may intensify this effect.  Use care when operating dangerous machinery.  Some non-prescription drugs may aggravate your condition.  Read all labels carefully.  If a warning appears, check with your doctor before taking.  Take with food or milk.  This drug may impair the ability to drive or operate machinery.  Use care until you become familiar with its effects.    -- Indication: For betablocker    budesonide-formoterol 160 mcg-4.5 mcg/inh inhalation aerosol  -- 2 puff(s) inhaled 2 times a day  -- Indication: For Inhalers    Proventil HFA 90 mcg/inh inhalation aerosol  -- 2 puff(s) inhaled 4 times a day  -- For inhalation only.  It is very important that you take or use this exactly as directed.  Do not skip doses or discontinue unless directed by your doctor.  Obtain medical advice before taking any non-prescription drugs as some may affect the action of this medication.  Shake well before use.    -- Indication: For Inhalers    furosemide 40 mg oral tablet  -- 1 tab(s) by mouth once a day  -- Indication: For Diuretic    pantoprazole 40 mg oral delayed release tablet  -- 1 tab(s) by mouth once a day (before a meal)  -- Indication: For Ppi    levothyroxine 50 mcg (0.05 mg) oral tablet  -- 1 tab(s) by mouth once a day  -- Indication: For Hypothyroidsm

## 2017-12-10 NOTE — DISCHARGE NOTE ADULT - HOME CARE AGENCY
[facility] [phone] Visiting nurse to call to arrange home care visit for 1-2 days after discharge. Please call home care agency with any questions or concerns.

## 2017-12-11 DIAGNOSIS — E78.5 HYPERLIPIDEMIA, UNSPECIFIED: ICD-10-CM

## 2017-12-11 DIAGNOSIS — M25.511 PAIN IN RIGHT SHOULDER: ICD-10-CM

## 2017-12-11 DIAGNOSIS — D72.829 ELEVATED WHITE BLOOD CELL COUNT, UNSPECIFIED: ICD-10-CM

## 2017-12-11 LAB
ANION GAP SERPL CALC-SCNC: 11 MMOL/L — SIGNIFICANT CHANGE UP (ref 5–17)
BASOPHILS # BLD AUTO: 0 K/UL — SIGNIFICANT CHANGE UP (ref 0–0.2)
BASOPHILS NFR BLD AUTO: 0.1 % — SIGNIFICANT CHANGE UP (ref 0–2)
BUN SERPL-MCNC: 14 MG/DL — SIGNIFICANT CHANGE UP (ref 7–23)
CALCIUM SERPL-MCNC: 8.2 MG/DL — LOW (ref 8.4–10.5)
CHLORIDE SERPL-SCNC: 101 MMOL/L — SIGNIFICANT CHANGE UP (ref 96–108)
CO2 SERPL-SCNC: 26 MMOL/L — SIGNIFICANT CHANGE UP (ref 22–31)
CREAT SERPL-MCNC: 0.71 MG/DL — SIGNIFICANT CHANGE UP (ref 0.5–1.3)
EOSINOPHIL # BLD AUTO: 0.2 K/UL — SIGNIFICANT CHANGE UP (ref 0–0.5)
EOSINOPHIL NFR BLD AUTO: 1.3 % — SIGNIFICANT CHANGE UP (ref 0–6)
GLUCOSE SERPL-MCNC: 100 MG/DL — HIGH (ref 70–99)
HCT VFR BLD CALC: 29.9 % — LOW (ref 34.5–45)
HGB BLD-MCNC: 9.6 G/DL — LOW (ref 11.5–15.5)
INR BLD: 1.22 RATIO — HIGH (ref 0.88–1.16)
LYMPHOCYTES # BLD AUTO: 13.9 % — SIGNIFICANT CHANGE UP (ref 13–44)
LYMPHOCYTES # BLD AUTO: 2.3 K/UL — SIGNIFICANT CHANGE UP (ref 1–3.3)
MCHC RBC-ENTMCNC: 28.9 PG — SIGNIFICANT CHANGE UP (ref 27–34)
MCHC RBC-ENTMCNC: 32.2 GM/DL — SIGNIFICANT CHANGE UP (ref 32–36)
MCV RBC AUTO: 89.6 FL — SIGNIFICANT CHANGE UP (ref 80–100)
MONOCYTES # BLD AUTO: 0.6 K/UL — SIGNIFICANT CHANGE UP (ref 0–0.9)
MONOCYTES NFR BLD AUTO: 3.6 % — SIGNIFICANT CHANGE UP (ref 2–14)
NEUTROPHILS # BLD AUTO: 13.4 K/UL — HIGH (ref 1.8–7.4)
NEUTROPHILS NFR BLD AUTO: 81 % — HIGH (ref 43–77)
PLATELET # BLD AUTO: 656 K/UL — HIGH (ref 150–400)
POTASSIUM SERPL-MCNC: 3.6 MMOL/L — SIGNIFICANT CHANGE UP (ref 3.5–5.3)
POTASSIUM SERPL-SCNC: 3.6 MMOL/L — SIGNIFICANT CHANGE UP (ref 3.5–5.3)
PROTHROM AB SERPL-ACNC: 13.4 SEC — HIGH (ref 9.8–12.7)
RBC # BLD: 3.34 M/UL — LOW (ref 3.8–5.2)
RBC # FLD: 15.6 % — HIGH (ref 10.3–14.5)
SODIUM SERPL-SCNC: 138 MMOL/L — SIGNIFICANT CHANGE UP (ref 135–145)
WBC # BLD: 16.6 K/UL — HIGH (ref 3.8–10.5)
WBC # FLD AUTO: 16.6 K/UL — HIGH (ref 3.8–10.5)

## 2017-12-11 PROCEDURE — 71010: CPT | Mod: 26

## 2017-12-11 PROCEDURE — 99232 SBSQ HOSP IP/OBS MODERATE 35: CPT

## 2017-12-11 RX ORDER — POTASSIUM CHLORIDE 20 MEQ
20 PACKET (EA) ORAL
Qty: 0 | Refills: 0 | Status: COMPLETED | OUTPATIENT
Start: 2017-12-11 | End: 2017-12-11

## 2017-12-11 RX ADMIN — Medication 12.5 MILLIGRAM(S): at 05:41

## 2017-12-11 RX ADMIN — Medication 12.5 MILLIGRAM(S): at 17:20

## 2017-12-11 RX ADMIN — Medication 3 MILLILITER(S): at 17:21

## 2017-12-11 RX ADMIN — Medication 325 MILLIGRAM(S): at 11:54

## 2017-12-11 RX ADMIN — LORATADINE 10 MILLIGRAM(S): 10 TABLET ORAL at 11:55

## 2017-12-11 RX ADMIN — ENOXAPARIN SODIUM 60 MILLIGRAM(S): 100 INJECTION SUBCUTANEOUS at 05:42

## 2017-12-11 RX ADMIN — Medication 50 MICROGRAM(S): at 05:41

## 2017-12-11 RX ADMIN — Medication 20 MILLIEQUIVALENT(S): at 17:20

## 2017-12-11 RX ADMIN — ALBUTEROL 2 PUFF(S): 90 AEROSOL, METERED ORAL at 06:44

## 2017-12-11 RX ADMIN — ENOXAPARIN SODIUM 60 MILLIGRAM(S): 100 INJECTION SUBCUTANEOUS at 17:21

## 2017-12-11 RX ADMIN — Medication 3 MILLILITER(S): at 11:55

## 2017-12-11 RX ADMIN — Medication 20 MILLIEQUIVALENT(S): at 19:07

## 2017-12-11 RX ADMIN — Medication 3 MILLILITER(S): at 05:41

## 2017-12-11 RX ADMIN — Medication 0.5 MILLIGRAM(S): at 06:46

## 2017-12-11 RX ADMIN — ATORVASTATIN CALCIUM 40 MILLIGRAM(S): 80 TABLET, FILM COATED ORAL at 21:08

## 2017-12-11 RX ADMIN — Medication 40 MILLIGRAM(S): at 05:41

## 2017-12-11 RX ADMIN — Medication 0.5 MILLIGRAM(S): at 17:21

## 2017-12-11 NOTE — PROGRESS NOTE ADULT - PROBLEM SELECTOR PLAN 1
likely 2nd severe mitral inflow obstruction 2nd LV mass   -Diuresis with Lasix as tolerated   -Dyspnea improved with diuresis   -Duoneb q6  -Check CXR in AM likely 2nd severe mitral inflow obstruction 2nd LV mass   -Diuresis with Lasix as tolerated   -Dyspnea improved with diuresis

## 2017-12-11 NOTE — PROGRESS NOTE ADULT - SUBJECTIVE AND OBJECTIVE BOX
HPI:from adm h+p:  78 yo F with rheumatoid arthritis, asthma, pulmomary HTN, HLD who presented to [facility] with weakness and dyspnea. The patient endorses that she was in her usual state of health until a few weeks ago when she developed back and shoulder pain after moving furniture was treated with prednisone.  Her symptoms did not resolve and received treatment with prednisone.  She was persistently feeling unwell and presented to the ER where she received hydration and was treated for a UTI and released.   She became concerned as she is typically very active and spends her summer at a swim club and can typically ascend stairs without dyspnea.  She returned to the emergency room at [facility] and underewent a TTE revealed an echodensity noted at the base of the posterior wall of the LV and attached to LV septum of unclear etiology.    Transferred to [facility] for further management.  Consults at [facility]:   Pulmonary  ID  Cardiology (06 Dec 2017 23:42)      Interval Events  ok overnite no real change  oob , sits in dark, ate breakfast, feels ok noco     MEDICATIONS  (STANDING):  ALBUTerol/ipratropium for Nebulization 3 milliLiter(s) Nebulizer every 6 hours  aspirin 325 milliGRAM(s) Oral daily  atorvastatin 40 milliGRAM(s) Oral at bedtime  buDESOnide   0.5 milliGRAM(s) Respule 0.5 milliGRAM(s) Inhalation every 12 hours  enoxaparin Injectable 60 milliGRAM(s) SubCutaneous every 12 hours  furosemide    Tablet 40 milliGRAM(s) Oral daily  levothyroxine 50 MICROGram(s) Oral daily  loratadine 10 milliGRAM(s) Oral daily  metoprolol     tartrate 12.5 milliGRAM(s) Oral two times a day  pantoprazole    Tablet 40 milliGRAM(s) Oral before breakfast    MEDICATIONS  (PRN):  acetaminophen   Tablet 650 milliGRAM(s) Oral every 6 hours PRN For Temp greater than 38.5 C (101.3 F)  acetaminophen   Tablet. 650 milliGRAM(s) Oral every 6 hours PRN Mild Pain (1 - 3)  ALBUTerol    90 MICROgram(s) HFA Inhaler 2 Puff(s) Inhalation every 6 hours PRN Shortness of Breath and/or Wheezing      Patient is a 77y old  Female who presents with a chief complaint of transfer for further management (10 Dec 2017 08:52)      REVIEW OF SYSTEMS    General:feels ok, no co ,waiting for decision re Dx test, no HA no dizzyness, no f/c  Skin/Breast:  Ophthalmologic:no ch v/h  ENMT:	  Respiratory and Thorax: no cough no sp no sob  Cardiovascular:no cp palp  Gastrointestinal: no nvcd  Genitourinary:no fdi  Musculoskeletal:	 no pain  Neurological:	no co  Psychiatric:	  Hematology/Lymphatics:	  Endocrine:	  Allergic/Immunologic:  AOSN	+      Vital Signs Last 24 Hrs  T(C): 36.6 (11 Dec 2017 04:55), Max: 36.6 (10 Dec 2017 13:59)  T(F): 97.9 (11 Dec 2017 04:55), Max: 97.9 (10 Dec 2017 13:59)  HR: 71 (11 Dec 2017 04:55) (71 - 99)  BP: 114/80 (11 Dec 2017 04:55) (103/69 - 116/78)  BP(mean): --  RR: 18 (11 Dec 2017 04:55) (18 - 18)  SpO2: 98% (11 Dec 2017 04:55) (92% - 98%)    PHYSICAL EXAM:    Constitutional:nad vssa oob realaxing  H+N  Eyes:  ENMT:  Neck:  Breasts:  Back:  Respiratory: ctab no rrw, occ quick cough no sp  Cardiovascular: rrr no m  Gastrointestinal: soft nt  Genitourinary:  Rectal:  Extremities: no cce  Vascular:  Neurological: nl  Skin:  Lymph Nodes:  Musculoskeletal:  Psychiatric:    LABS  CBC Full  -  ( 11 Dec 2017 05:56 )  WBC Count : 16.6 K/uL  Hemoglobin : 9.6 g/dL  Hematocrit : 29.9 %  Platelet Count - Automated : 656 K/uL  Mean Cell Volume : 89.6 fl  Mean Cell Hemoglobin : 28.9 pg  Mean Cell Hemoglobin Concentration : 32.2 gm/dL  Auto Neutrophil # : 13.4 K/uL  Auto Lymphocyte # : 2.3 K/uL  Auto Monocyte # : 0.6 K/uL  Auto Eosinophil # : 0.2 K/uL  Auto Basophil # : 0.0 K/uL  Auto Neutrophil % : 81.0 %  Auto Lymphocyte % : 13.9 %  Auto Monocyte % : 3.6 %  Auto Eosinophil % : 1.3 %  Auto Basophil % : 0.1 %      12-11    138  |  101  |  14  ----------------------------<  100<H>  3.6   |  26  |  0.71    Ca    8.2<L>      11 Dec 2017 05:56        PT/INR - ( 11 Dec 2017 05:56 )   PT: 13.4 sec;   INR: 1.22 ratio             Imaging:    Xray:    Echo:    CT:    MRI:    Tele: robbie, [id number]    Orders:    MIRI Berrios [phone]

## 2017-12-11 NOTE — PROGRESS NOTE ADULT - ASSESSMENT
A/P: 77 year old woman with pmhx RA, migraines, HLD, hypothyroidism transferred from OSH after mass identified on echocardiogram, confirmed on AMAN at [facility] with large mass L ventricle with mitral obstruction concerning for tumor, and right atrial appendage mobile echodensities. CT head performed which suggested right frontal infarcts. MRI brain performed yesterday disclosed multiple tiny scattered supratentorial, right greater than left and infratentorial, tiny cerebellar, infarcts concerning for embolic etiology. CT chest no PE, right apical opacity, bilateral pleural effusions. Concern for underlining malignancy. Awaiting cardiac MRI. Neurological examination nonlateralizing. NIHSS=0.    Etiology of infarcts likely cardioembolic, in setting of r/o cardiac tumor r/o endocarditis, possible hypercoaguability of cancer    Cardiac MRI  A nodular mass with an irregular contour extends the perimeter of   posterior mitral valve leaflet measuring approximately 27 mm x 10 mm.    Posterior mitral valve leaflet excursion appears decreased secondary to   the associated mass resulting in turbulent mitral inflow.  The   differential diagnosis includes rheumatoid nodules with overlying   thrombus, superimposed infective endocarditis and tumor.    Neuro stable    Plan:  Started on AC with Lovenox as per cardiology  Await repeat cardiac MRI  Further management of cardiac mass as per cardiology/CTSurg  Continue Neurochecks  Follow up blood cultures  Continue Abx for UTI  Heme/Onc following  Close observation  Will follow    Plan reviewed with pt and daughter at bedside

## 2017-12-11 NOTE — PROGRESS NOTE ADULT - SUBJECTIVE AND OBJECTIVE BOX
SUBJECTIVE: No new neurologic events overnight.  No new neurologic complaints.  awaiting repeat cardiac mri    Medications:  MEDICATIONS  (STANDING):  ALBUTerol/ipratropium for Nebulization 3 milliLiter(s) Nebulizer every 6 hours  aspirin 325 milliGRAM(s) Oral daily  atorvastatin 40 milliGRAM(s) Oral at bedtime  buDESOnide   0.5 milliGRAM(s) Respule 0.5 milliGRAM(s) Inhalation every 12 hours  enoxaparin Injectable 60 milliGRAM(s) SubCutaneous every 12 hours  furosemide    Tablet 40 milliGRAM(s) Oral daily  levothyroxine 50 MICROGram(s) Oral daily  loratadine 10 milliGRAM(s) Oral daily  LORazepam   Injectable 1 milliGRAM(s) IV Push once  metoprolol     tartrate 12.5 milliGRAM(s) Oral two times a day  pantoprazole    Tablet 40 milliGRAM(s) Oral before breakfast  potassium chloride    Tablet ER 20 milliEquivalent(s) Oral every 2 hours    MEDICATIONS  (PRN):  acetaminophen   Tablet 650 milliGRAM(s) Oral every 6 hours PRN For Temp greater than 38.5 C (101.3 F)  acetaminophen   Tablet. 650 milliGRAM(s) Oral every 6 hours PRN Mild Pain (1 - 3)  ALBUTerol    90 MICROgram(s) HFA Inhaler 2 Puff(s) Inhalation every 6 hours PRN Shortness of Breath and/or Wheezing      Labs:  CBC Full  -  ( 11 Dec 2017 05:56 )  WBC Count : 16.6 K/uL  Hemoglobin : 9.6 g/dL  Hematocrit : 29.9 %  Platelet Count - Automated : 656 K/uL  Mean Cell Volume : 89.6 fl  Mean Cell Hemoglobin : 28.9 pg  Mean Cell Hemoglobin Concentration : 32.2 gm/dL  Auto Neutrophil # : 13.4 K/uL  Auto Lymphocyte # : 2.3 K/uL  Auto Monocyte # : 0.6 K/uL  Auto Eosinophil # : 0.2 K/uL  Auto Basophil # : 0.0 K/uL  Auto Neutrophil % : 81.0 %  Auto Lymphocyte % : 13.9 %  Auto Monocyte % : 3.6 %  Auto Eosinophil % : 1.3 %  Auto Basophil % : 0.1 %    12-11    138  |  101  |  14  ----------------------------<  100<H>  3.6   |  26  |  0.71    Ca    8.2<L>      11 Dec 2017 05:56      CAPILLARY BLOOD GLUCOSE          PT/INR - ( 11 Dec 2017 05:56 )   PT: 13.4 sec;   INR: 1.22 ratio               Vitals:  Vital Signs Last 24 Hrs  T(C): 36.4 (11 Dec 2017 13:55), Max: 36.6 (11 Dec 2017 04:55)  T(F): 97.5 (11 Dec 2017 13:55), Max: 97.9 (11 Dec 2017 04:55)  HR: 101 (11 Dec 2017 15:15) (71 - 101)  BP: 117/71 (11 Dec 2017 15:15) (103/68 - 117/71)  BP(mean): --  RR: 18 (11 Dec 2017 13:55) (18 - 18)  SpO2: 96% (11 Dec 2017 13:55) (96% - 98%)        NEUROLOGICAL EXAM:    Mental status: Awake, alert, and in no apparent distress. Oriented x 3. Language function is normal. Recent memory, digit span and concentration were normal.     Cranial Nerves: Pupils were equal, round, reactive to light. Extraocular movements were intact. Visual field were full. Fundoscopic exam was deferred. Facial sensation was intact to light touch. There was no facial asymmetry. The palate was upgoing symmetrically and tongue was midline. Hearing acuity was intact to finger rub AU. Shoulder shrug was full bilaterally    Motor exam: Bulk and tone were normal. Strength was 5/5 in all four extremities. Fine finger movements were symmetric and normal. There was no pronator drift    Reflexes: DTRs normoactive in all extremities. Toes were downgoing bilaterally.     Sensation: Intact to light touch, temperature.     Coordination: Finger-nose-finger without dysmetria.     Gait: deferred    NIHSS=0.    < from: CT Angio Chest w/ IV Cont (12.07.17 @ 21:52) >  INTERPRETATION:  no pulmonary embolism. redemonstrated interlobular   septal wall thickening and ground glass opacities, likely representing   pulmonary edema. denser airspace opacity in right lung apex is new and   may represent pulmonary edema, however infection cannot be entirely   excluded. small bilateral pleural effusions, increased since 12/4/17.    < from: MR Head No Cont (12.06.17 @ 15:07) >  FINDINGS:  There are numerous small foci of diffusion restriction, some with   associated T2/FLAIR hyperintensity, bilaterally in the bilateral frontal,   parietal, occipital, left temporal lobes, and bilateral cerebellar   hemispheres, compatible with acute infarcts.    The ventricles and cortical sulci are within normal limits for age. No   intracranial hemorrhage, mass effect or midline shift. The basal cisterns   are patent.    Scattered foci of T2/FLAIR hyperintensity are noted in the   periventricular white matter, nonspecific but likely sequela of small   vessel ischemic disease.    The major intracranial flow voids at the skull base are preserved. The   paranasal sinuses and left mastoid air cells are well aerated. Scattered   fluid signal in the right mastoid air cells.    IMPRESSION:  Numerous small acute infarcts bilaterally in the supratentorial and   infratentorial compartments; consider central embolic phenomenon.      < from: CT Chest No Cont (12.04.17 @ 14:48) >    FINDINGS:    CHEST:     LUNGS AND LARGE AIRWAYS: Patent central airways.  Mild bilateral   interlobular septal thickening and groundglass opacity, consistent with   interstitial pulmonary edema. No pulmonary consolidation.  PLEURA: Trace bilateral pleural effusions.  VESSELS: Within normal limits.  HEART: Heart size is normal. No pericardial effusion.  MEDIASTINUM AND BIRD: No lymphadenopathy.  CHEST WALL AND LOWER NECK: Bilateral peripherally calcified breast   implants.  VISUALIZED UPPER ABDOMEN: Within normal limits.  BONES: Mild degenerative changes.    IMPRESSION: Mild interstitial pulmonary edema and trace bilateral pleural   effusions.    < from: CT Head No Cont (12.04.17 @ 14:42) >  Impression: No acute intracranial hemorrhage.     Small age indeterminate territorial infarct in the right high frontal   lobe. If clinically indicated, brain MR may be pursued for further   evaluation.    Mild chronic small vessel ischemic disease.    < end of copied text >  < from: AMAN w/TTE (w/Cont, w/3D Echo) (12.07.17 @ 12:39) >  Conclusions:  1. Mild mitral annular calcification. There is a large mass  (measuring approximately 2.1 cm x 1.8 cm) in the left  ventricle ) that extends into the subvalvular apparatus and  involves the ventricular surface of both leaflets of the  mitral valve causing significant obstruction of mitral  inflow. The mass likely represents tumor. Mild mitral  regurgitation. Peak mitral valve gradient equals 19 mm Hg,  mean transmitral valve gradient equals 12-13 mm Hg,  consistent with severe mitral stenosis. (HRabout 80s-90s  bpm) Mean gradient about 24 mmHg with HRabout 100-110 bpm  during TTE.  2. Calcified trileaflet aortic valve with normal opening.  Minimal aortic regurgitation.  3. Normal aortic root and ascending aorta. (Ao: 2.7 cm at  the sinuses of Valsalva). Simple, nonmobile atheroma noted  in the aortic arch and descending aorta.  4. Normal left atrium.  LA volume index = 18 cc/m2. No left  atrial or left atrial appendage thrombus. Normal left  atrial appendage function (velocity> 40 cm/s).  5. Hyperdynamic left ventricular systolic function. No  evidence of LVOT obstruction.  6. Normal diastolic function  7. Mild right atrial enlargement. Linear, mobile  echodensities are seen arising by the right atrial  appendage could represent thrombi, tumor, or vegetation.  8. Right ventricular enlargement with normal right  ventricular systolic function.  9. Agitated saline injection and color flow doppler  demonstrate no evidence of a patent foramen ovale.  10. Trace pericardialeffusion.  11. Bilateral pleural effusions.  Consider cardiac MRI for tissue characterization of the  large LV mass and the linear, mobile echodensities in the  right atrium.  *** No previous Echo exam.    < from: MR Cardiac No Cont (12.08.17 @ 11:04) >    IMPRESSION:   CMR terminated prior to completion of the study due to patient   preference; degraded image quality due to respiratory motion.  Limited   CMR assessment.  1.  A nodular mass with an irregular contour extends the perimeter of   posterior mitral valve leaflet measuring approximately 27 mm x 10 mm.    Posterior mitral valve leaflet excursion appears decreased secondary to   the associated mass resulting in turbulent mitral inflow.  The   differential diagnosis includes rheumatoid nodules with overlying   thrombus, superimposed infective endocarditis and tumor.  2.  Right ventricle appears qualitatively dilated and hypokinetic.  The   interventricular septum flattens in early diastole consistent with right   ventricular volume overload.  3.  Small to moderate bilateral pleural effusions.

## 2017-12-11 NOTE — PROGRESS NOTE ADULT - SUBJECTIVE AND OBJECTIVE BOX
Patient is a 77y old  Female who presents with a chief complaint of transfer for further management (10 Dec 2017 08:52)      SUBJECTIVE / OVERNIGHT EVENTS:  No chest pain. No shortness of breath. No complaints. No events overnight.     MEDICATIONS  (STANDING):  ALBUTerol/ipratropium for Nebulization 3 milliLiter(s) Nebulizer every 6 hours  aspirin 325 milliGRAM(s) Oral daily  atorvastatin 40 milliGRAM(s) Oral at bedtime  buDESOnide   0.5 milliGRAM(s) Respule 0.5 milliGRAM(s) Inhalation every 12 hours  enoxaparin Injectable 60 milliGRAM(s) SubCutaneous every 12 hours  furosemide    Tablet 40 milliGRAM(s) Oral daily  levothyroxine 50 MICROGram(s) Oral daily  loratadine 10 milliGRAM(s) Oral daily  metoprolol     tartrate 12.5 milliGRAM(s) Oral two times a day  pantoprazole    Tablet 40 milliGRAM(s) Oral before breakfast    MEDICATIONS  (PRN):  acetaminophen   Tablet 650 milliGRAM(s) Oral every 6 hours PRN For Temp greater than 38.5 C (101.3 F)  acetaminophen   Tablet. 650 milliGRAM(s) Oral every 6 hours PRN Mild Pain (1 - 3)  ALBUTerol    90 MICROgram(s) HFA Inhaler 2 Puff(s) Inhalation every 6 hours PRN Shortness of Breath and/or Wheezing      Vital Signs Last 24 Hrs  T(C): 36.6 (11 Dec 2017 04:55), Max: 36.6 (10 Dec 2017 13:59)  T(F): 97.9 (11 Dec 2017 04:55), Max: 97.9 (10 Dec 2017 13:59)  HR: 71 (11 Dec 2017 04:55) (71 - 99)  BP: 114/80 (11 Dec 2017 04:55) (103/69 - 116/78)  BP(mean): --  RR: 18 (11 Dec 2017 04:55) (18 - 18)  SpO2: 98% (11 Dec 2017 04:55) (92% - 98%)  CAPILLARY BLOOD GLUCOSE        I&O's Summary    10 Dec 2017 07:01  -  11 Dec 2017 07:00  --------------------------------------------------------  IN: 880 mL / OUT: 200 mL / NET: 680 mL    11 Dec 2017 07:01  -  11 Dec 2017 12:15  --------------------------------------------------------  IN: 100 mL / OUT: 0 mL / NET: 100 mL        PHYSICAL EXAM:  GENERAL: NAD, well-developed  HEAD:  Atraumatic, Normocephalic  EYES: EOMI, PERRLA, conjunctiva and sclera clear  NECK: Supple, No JVD  CHEST/LUNG: Clear to auscultation bilaterally; No wheeze  HEART: Regular rate and rhythm; No murmurs, rubs, or gallops  ABDOMEN: Soft, Nontender, Nondistended; Bowel sounds present  EXTREMITIES:  2+ Peripheral Pulses, No clubbing, cyanosis, or edema  PSYCH: AAOx3  NEUROLOGY: non-focal  SKIN: No rashes or lesions    LABS:                        9.6    16.6  )-----------( 656      ( 11 Dec 2017 05:56 )             29.9     12-11    138  |  101  |  14  ----------------------------<  100<H>  3.6   |  26  |  0.71    Ca    8.2<L>      11 Dec 2017 05:56      PT/INR - ( 11 Dec 2017 05:56 )   PT: 13.4 sec;   INR: 1.22 ratio                   RADIOLOGY & ADDITIONAL TESTS:    Imaging Personally Reviewed:    Consultant(s) Notes Reviewed:      Care Discussed with Consultants/Other Providers:

## 2017-12-11 NOTE — PROGRESS NOTE ADULT - ASSESSMENT
stable now, wbc declining, on abx for UTi see ID note (Sx decision should depend on Dx)  cardio to FU re MRI, pt aware   I will speak c PCP today re RA and Pulm Hx

## 2017-12-11 NOTE — PROGRESS NOTE ADULT - ASSESSMENT
76 yo F with RA, Asthma, pulmonary HTN, HLD who presented to [facility] with weakness and dyspnea. Found to have large LV mass, transferred to [facility] for further mgmt. Further findings of acute/subacute CVA concerning for cardioembolic source, CT chest with pulmonary edema. ESBL E.Coli UTI. Severe mitral stenosis 2nd mass. RA with mobile echodensities.

## 2017-12-11 NOTE — PROGRESS NOTE ADULT - PROBLEM SELECTOR PLAN 1
likely 2nd severe mitral inflow obstruction 2nd LV mass   -Diuresis with Lasix as tolerated   -Dyspnea improved with diuresis

## 2017-12-11 NOTE — PROGRESS NOTE ADULT - ASSESSMENT
76 yo F with RA, Asthma, pulmonary HTN, HLD who presented to [facility] with weakness and dyspnea. Found to have large LV mass, transferred to [facility] for further mgmt. Further findings of acute/subacute CVA concerning for cardioembolic source, CT chest with pulmonary edema. ESBL E.Coli UTI. Severe mitral stenosis 2nd mass. RA with mobile echodensities.

## 2017-12-11 NOTE — PROGRESS NOTE ADULT - SUBJECTIVE AND OBJECTIVE BOX
infectious diseases progress note:    Patient is a 77y old  Female who presents with a chief complaint of transfer for further management (10 Dec 2017 08:52)        Cardiomyopathy  Cardiomyopathy        ROS:  CONSTITUTIONAL:  Negative fever or chills, feels well, good appetite  EYES:  Negative  blurry vision or double vision  CARDIOVASCULAR:  Negative for chest pain or palpitations  RESPIRATORY:  Negative for cough, wheezing, or SOB   GASTROINTESTINAL:  Negative for nausea, vomiting, diarrhea, constipation, or abdominal pain  GENITOURINARY:  Negative frequency, urgency or dysuria  NEUROLOGIC:  No headache, confusion, dizziness, lightheadedness    Allergies    No Known Allergies    Intolerances        ANTIBIOTICS/RELEVANT:  antimicrobials    immunologic:    OTHER:  acetaminophen   Tablet 650 milliGRAM(s) Oral every 6 hours PRN  acetaminophen   Tablet. 650 milliGRAM(s) Oral every 6 hours PRN  ALBUTerol    90 MICROgram(s) HFA Inhaler 2 Puff(s) Inhalation every 6 hours PRN  ALBUTerol/ipratropium for Nebulization 3 milliLiter(s) Nebulizer every 6 hours  aspirin 325 milliGRAM(s) Oral daily  atorvastatin 40 milliGRAM(s) Oral at bedtime  buDESOnide   0.5 milliGRAM(s) Respule 0.5 milliGRAM(s) Inhalation every 12 hours  enoxaparin Injectable 60 milliGRAM(s) SubCutaneous every 12 hours  furosemide    Tablet 40 milliGRAM(s) Oral daily  levothyroxine 50 MICROGram(s) Oral daily  loratadine 10 milliGRAM(s) Oral daily  metoprolol     tartrate 12.5 milliGRAM(s) Oral two times a day  pantoprazole    Tablet 40 milliGRAM(s) Oral before breakfast      Objective:  Vital Signs Last 24 Hrs  T(C): 36.6 (11 Dec 2017 04:55), Max: 36.6 (10 Dec 2017 13:59)  T(F): 97.9 (11 Dec 2017 04:55), Max: 97.9 (10 Dec 2017 13:59)  HR: 71 (11 Dec 2017 04:55) (71 - 99)  BP: 114/80 (11 Dec 2017 04:55) (103/69 - 116/78)  BP(mean): --  RR: 18 (11 Dec 2017 04:55) (18 - 18)  SpO2: 98% (11 Dec 2017 04:55) (92% - 98%)    PHYSICAL EXAM:  Constitutional:Well-developed, well nourished--no acute distress  Eyes:AUGIE, EOMI  Ear/Nose/Throat: no oral lesion, no sinus tenderness on percussion	  Neck:no JVD, no lymphadenopathy, supple  Respiratory: CTA karlos  Cardiovascular: S1S2 RRR, no murmurs  Gastrointestinal:soft, (+) BS, no HSM  Extremities:no e/e/c        LABS:                        9.6    16.6  )-----------( 656      ( 11 Dec 2017 05:56 )             29.9     12-11    138  |  101  |  14  ----------------------------<  100<H>  3.6   |  26  |  0.71    Ca    8.2<L>      11 Dec 2017 05:56      PT/INR - ( 11 Dec 2017 05:56 )   PT: 13.4 sec;   INR: 1.22 ratio                 MICROBIOLOGY:    RECENT CULTURES:  12-04 @ 23:14 .Urine Clean Catch (Midstream)   DOUG      Escherichia coli ESBL  Escherichia coli ESBL     10,000 - 49,000 CFU/mL Escherichia coli ESBL    12-04 @ 18:28 .Blood Blood                No growth at 5 days.    12-04 @ 18:27 .Blood Blood                No growth at 5 days.          RESPIRATORY CULTURES:              RADIOLOGY & ADDITIONAL STUDIES:        Pager [phone]  After 5 pm/weekends or if no response :[phone]

## 2017-12-11 NOTE — PHYSICAL THERAPY INITIAL EVALUATION ADULT - ADDITIONAL COMMENTS
Urine culture positive for ESBL ecoli. PMH RA, asthma, pulmomary HTN, HLD recently treated for suspected musculoskeletal strain and urinary tract infection Urine culture positive for ESBL ecoli. PMH RA, asthma, pulmomary HTN, HLD recently treated for suspected musculoskeletal strain and urinary tract infection. Home situation/prior level of function- Pt lives alone in condo building with elevator, no stairs to negotiate. Daughter lives nearby. Pt ambulates without assistive device. Uses access-a-ride as needed.

## 2017-12-11 NOTE — PROGRESS NOTE ADULT - ASSESSMENT
77 yr old with RA  presents with multiple tracey cva possible embolic and an abnormal tte with mass v. clot. veg .   No fever  recent blood cultures negative but urine culture positive for esbl ecoli.   currently on Invanz for urine culture.      blood cultures are negative and anaya suggests tumor not endocardtiis    would complete course of invanz  today    work up for underlying tumor being completed  not a candidate for cardiac surgery     discussed with Dr. Landaverde

## 2017-12-11 NOTE — PROGRESS NOTE ADULT - SUBJECTIVE AND OBJECTIVE BOX
Follow-up Pulm Progress Note    No new respiratory events overnight.  Denies SOB/CP. cough better, o2 sat 98% room air    Medications:  MEDICATIONS  (STANDING):  ALBUTerol/ipratropium for Nebulization 3 milliLiter(s) Nebulizer every 6 hours  aspirin 325 milliGRAM(s) Oral daily  atorvastatin 40 milliGRAM(s) Oral at bedtime  buDESOnide   0.5 milliGRAM(s) Respule 0.5 milliGRAM(s) Inhalation every 12 hours  enoxaparin Injectable 60 milliGRAM(s) SubCutaneous every 12 hours  furosemide    Tablet 40 milliGRAM(s) Oral daily  levothyroxine 50 MICROGram(s) Oral daily  loratadine 10 milliGRAM(s) Oral daily  metoprolol     tartrate 12.5 milliGRAM(s) Oral two times a day  pantoprazole    Tablet 40 milliGRAM(s) Oral before breakfast    MEDICATIONS  (PRN):  acetaminophen   Tablet 650 milliGRAM(s) Oral every 6 hours PRN For Temp greater than 38.5 C (101.3 F)  acetaminophen   Tablet. 650 milliGRAM(s) Oral every 6 hours PRN Mild Pain (1 - 3)  ALBUTerol    90 MICROgram(s) HFA Inhaler 2 Puff(s) Inhalation every 6 hours PRN Shortness of Breath and/or Wheezing          Vital Signs Last 24 Hrs  T(C): 36.6 (11 Dec 2017 04:55), Max: 36.6 (10 Dec 2017 13:59)  T(F): 97.9 (11 Dec 2017 04:55), Max: 97.9 (10 Dec 2017 13:59)  HR: 71 (11 Dec 2017 04:55) (71 - 99)  BP: 114/80 (11 Dec 2017 04:55) (103/69 - 116/78)  BP(mean): --  RR: 18 (11 Dec 2017 04:55) (18 - 18)  SpO2: 98% (11 Dec 2017 04:55) (92% - 98%)          12-10 @ 07:01  -  12-11 @ 07:00  --------------------------------------------------------  IN: 880 mL / OUT: 200 mL / NET: 680 mL          LABS:                        9.6    16.6  )-----------( 656      ( 11 Dec 2017 05:56 )             29.9     12-11    138  |  101  |  14  ----------------------------<  100<H>  3.6   |  26  |  0.71    Ca    8.2<L>      11 Dec 2017 05:56            CAPILLARY BLOOD GLUCOSE        PT/INR - ( 11 Dec 2017 05:56 )   PT: 13.4 sec;   INR: 1.22 ratio                             CULTURES: (if applicable)  Culture Results:   10,000 - 49,000 CFU/mL Escherichia coli ESBL (12-04 @ 23:14)  Culture Results:   No growth at 5 days. (12-04 @ 18:28)  Culture Results:   No growth at 5 days. (12-04 @ 18:27)          Physical Examination:  PULM: Clear to auscultation bilaterally, no significant sputum production  CVS: S1, S2 heard    RADIOLOGY REVIEWED  CXR:     CT chest:    TTE: Follow-up Pulm Progress Note    No new respiratory events overnight.  Denies SOB/CP. cough and dyspnea better, o2 sat 98% room air    Medications:  MEDICATIONS  (STANDING):  ALBUTerol/ipratropium for Nebulization 3 milliLiter(s) Nebulizer every 6 hours  aspirin 325 milliGRAM(s) Oral daily  atorvastatin 40 milliGRAM(s) Oral at bedtime  buDESOnide   0.5 milliGRAM(s) Respule 0.5 milliGRAM(s) Inhalation every 12 hours  enoxaparin Injectable 60 milliGRAM(s) SubCutaneous every 12 hours  furosemide    Tablet 40 milliGRAM(s) Oral daily  levothyroxine 50 MICROGram(s) Oral daily  loratadine 10 milliGRAM(s) Oral daily  metoprolol     tartrate 12.5 milliGRAM(s) Oral two times a day  pantoprazole    Tablet 40 milliGRAM(s) Oral before breakfast    MEDICATIONS  (PRN):  acetaminophen   Tablet 650 milliGRAM(s) Oral every 6 hours PRN For Temp greater than 38.5 C (101.3 F)  acetaminophen   Tablet. 650 milliGRAM(s) Oral every 6 hours PRN Mild Pain (1 - 3)  ALBUTerol    90 MICROgram(s) HFA Inhaler 2 Puff(s) Inhalation every 6 hours PRN Shortness of Breath and/or Wheezing          Vital Signs Last 24 Hrs  T(C): 36.6 (11 Dec 2017 04:55), Max: 36.6 (10 Dec 2017 13:59)  T(F): 97.9 (11 Dec 2017 04:55), Max: 97.9 (10 Dec 2017 13:59)  HR: 71 (11 Dec 2017 04:55) (71 - 99)  BP: 114/80 (11 Dec 2017 04:55) (103/69 - 116/78)  BP(mean): --  RR: 18 (11 Dec 2017 04:55) (18 - 18)  SpO2: 98% (11 Dec 2017 04:55) (92% - 98%)          12-10 @ 07:01  -  12-11 @ 07:00  --------------------------------------------------------  IN: 880 mL / OUT: 200 mL / NET: 680 mL          LABS:                        9.6    16.6  )-----------( 656      ( 11 Dec 2017 05:56 )             29.9     12-11    138  |  101  |  14  ----------------------------<  100<H>  3.6   |  26  |  0.71    Ca    8.2<L>      11 Dec 2017 05:56            CAPILLARY BLOOD GLUCOSE        PT/INR - ( 11 Dec 2017 05:56 )   PT: 13.4 sec;   INR: 1.22 ratio                             CULTURES: (if applicable)  Culture Results:   10,000 - 49,000 CFU/mL Escherichia coli ESBL (12-04 @ 23:14)  Culture Results:   No growth at 5 days. (12-04 @ 18:28)  Culture Results:   No growth at 5 days. (12-04 @ 18:27)          Physical Examination:  PULM: decreased bs bilaterally,no wheeze or crackles no significant sputum production  CVS: S1, S2 heard    RADIOLOGY REVIEWED  CXR: < from: Xray Chest 1 View AP -PORTABLE-Routine (12.11.17 @ 05:43) >  IMPRESSION:   Mild pulmonary edema with trace right pleural effusion.    < end of copied text >  < from: CT Angio Chest w/ IV Cont (12.07.17 @ 21:52) >  PROCEDURE DATE:  12/07/2017            INTERPRETATION:  CLINICAL INFORMATION: Evaluate for pulmonary embolism.   Cardiac mass versus thrombus.    COMPARISON: CT chest 12/4/2017    PROCEDURE:   CTA of the Chest was performed with intravenous contrast.  90 ml of Omnipaque 350 was injected intravenously. 10 ml were discarded.  Sagittal and coronal reformats were performed as well as3D   Reconstructions.      FINDINGS:    CHEST:     LUNGS AND LARGE AIRWAYS: Patent central airways. Diffuse interlobular   septal thickening and groundglass opacities, consistent with pulmonary   edema. New patchy right apical opacities. Right middlelobe and bilateral   lower lobe subsegmental atelectasis.  PLEURA: Small to moderate bilateral pleural effusions.  VESSELS: No abnormal filling defects to suggest pulmonary embolism.   Reflux of contrast within the hepatic veins. Atherosclerotic changes of   the aorta.  HEART: Approximately 2.0 x 1.7 cm mass lesion within the left ventricle.   Heart size is normal.No pericardial effusion.  MEDIASTINUM AND BIRD: Conglomerate lymphadenopathy within the   mediastinum.   CHEST WALL AND LOWER NECK: Peripherally calcified bilateral breast   implants.  VISUALIZED UPPER ABDOMEN: Within normal limits.  BONES: Degenerative changes.    IMPRESSION:     No pulmonary embolism.    2.0 cm mass lesion within the left ventricle adjacent to the posterior   mitral leaflet. Given its location, they may be causing poor mitral   inflow and resultant pulmonary edema. A cardiac MRI is pending .    Small bilateral pleural effusions.    < end of copied text >    < from: AMAN w/TTE (w/Cont, w/3D Echo) (12.07.17 @ 12:39) >  ------------------------------------------------------------------------  Conclusions:  1. Mild mitral annular calcification. There is a large mass  (measuring approximately 2.1 cm x 1.8 cm) in the left  ventricle ) that extends into the subvalvular apparatus and  involves the ventricular surface of both leaflets of the  mitral valve causing significant obstruction of mitral  inflow. The mass likely represents tumor. Mild mitral  regurgitation. Peak mitral valve gradient equals 19 mm Hg,  mean transmitral valve gradient equals 12-13 mm Hg,  consistent with severe mitral stenosis. (HRabout 80s-90s  bpm) Mean gradient about 24 mmHg with HRabout 100-110 bpm  during TTE.  2. Calcified trileaflet aortic valve with normal opening.  Minimal aortic regurgitation.  3. Normal aortic root and ascending aorta. (Ao: 2.7 cm at  the sinuses of Valsalva). Simple, nonmobile atheroma noted  in the aortic arch and descending aorta.  4. Normal left atrium.  LA volume index = 18 cc/m2. No left  atrial or left atrial appendage thrombus. Normal left  atrial appendage function (velocity> 40 cm/s).  5. Hyperdynamic left ventricular systolic function. No  evidence of LVOT obstruction.  6. Normal diastolic function  7. Mild right atrial enlargement. Linear, mobile  echodensities are seen arising by the right atrial  appendage could represent thrombi, tumor, or vegetation.  8. Right ventricular enlargement with normal right  ventricular systolic function.  9. Agitated saline injection and color flow doppler  demonstrate no evidence of a patent foramen ovale.  10. Trace pericardialeffusion.  11. Bilateral pleural effusions    < end of copied text > Follow-up Pulm Progress Note    No new respiratory events overnight.  Denies SOB/CP. cough and dyspnea better, o2 sat 98% room air    Medications:  MEDICATIONS  (STANDING):  ALBUTerol/ipratropium for Nebulization 3 milliLiter(s) Nebulizer every 6 hours  aspirin 325 milliGRAM(s) Oral daily  atorvastatin 40 milliGRAM(s) Oral at bedtime  buDESOnide   0.5 milliGRAM(s) Respule 0.5 milliGRAM(s) Inhalation every 12 hours  enoxaparin Injectable 60 milliGRAM(s) SubCutaneous every 12 hours  furosemide    Tablet 40 milliGRAM(s) Oral daily  levothyroxine 50 MICROGram(s) Oral daily  loratadine 10 milliGRAM(s) Oral daily  metoprolol     tartrate 12.5 milliGRAM(s) Oral two times a day  pantoprazole    Tablet 40 milliGRAM(s) Oral before breakfast    MEDICATIONS  (PRN):  acetaminophen   Tablet 650 milliGRAM(s) Oral every 6 hours PRN For Temp greater than 38.5 C (101.3 F)  acetaminophen   Tablet. 650 milliGRAM(s) Oral every 6 hours PRN Mild Pain (1 - 3)  ALBUTerol    90 MICROgram(s) HFA Inhaler 2 Puff(s) Inhalation every 6 hours PRN Shortness of Breath and/or Wheezing    Vital Signs Last 24 Hrs  T(C): 36.6 (11 Dec 2017 04:55), Max: 36.6 (10 Dec 2017 13:59)  T(F): 97.9 (11 Dec 2017 04:55), Max: 97.9 (10 Dec 2017 13:59)  HR: 71 (11 Dec 2017 04:55) (71 - 99)  BP: 114/80 (11 Dec 2017 04:55) (103/69 - 116/78)  BP(mean): --  RR: 18 (11 Dec 2017 04:55) (18 - 18)  SpO2: 98% (11 Dec 2017 04:55) (92% - 98%)    12-10 @ 07:01  -  12-11 @ 07:00  --------------------------------------------------------  IN: 880 mL / OUT: 200 mL / NET: 680 mL          LABS:                        9.6    16.6  )-----------( 656      ( 11 Dec 2017 05:56 )             29.9     12-11    138  |  101  |  14  ----------------------------<  100<H>  3.6   |  26  |  0.71    Ca    8.2<L>      11 Dec 2017 05:56    CAPILLARY BLOOD GLUCOSE    PT/INR - ( 11 Dec 2017 05:56 )   PT: 13.4 sec;   INR: 1.22 ratio      CULTURES: (if applicable)  Culture Results:   10,000 - 49,000 CFU/mL Escherichia coli ESBL (12-04 @ 23:14)  Culture Results:   No growth at 5 days. (12-04 @ 18:28)  Culture Results:   No growth at 5 days. (12-04 @ 18:27)          Physical Examination:  PULM: decreased bs bilaterally,no wheeze or crackles no significant sputum production  CVS: S1, S2 heard    RADIOLOGY REVIEWED  CXR: < from: Xray Chest 1 View AP -PORTABLE-Routine (12.11.17 @ 05:43) >  IMPRESSION:   Mild pulmonary edema with trace right pleural effusion.    < end of copied text >  < from: CT Angio Chest w/ IV Cont (12.07.17 @ 21:52) >  PROCEDURE DATE:  12/07/2017            INTERPRETATION:  CLINICAL INFORMATION: Evaluate for pulmonary embolism.   Cardiac mass versus thrombus.    COMPARISON: CT chest 12/4/2017    PROCEDURE:   CTA of the Chest was performed with intravenous contrast.  90 ml of Omnipaque 350 was injected intravenously. 10 ml were discarded.  Sagittal and coronal reformats were performed as well as3D   Reconstructions.      FINDINGS:    CHEST:     LUNGS AND LARGE AIRWAYS: Patent central airways. Diffuse interlobular   septal thickening and groundglass opacities, consistent with pulmonary   edema. New patchy right apical opacities. Right middlelobe and bilateral   lower lobe subsegmental atelectasis.  PLEURA: Small to moderate bilateral pleural effusions.  VESSELS: No abnormal filling defects to suggest pulmonary embolism.   Reflux of contrast within the hepatic veins. Atherosclerotic changes of   the aorta.  HEART: Approximately 2.0 x 1.7 cm mass lesion within the left ventricle.   Heart size is normal.No pericardial effusion.  MEDIASTINUM AND BIRD: Conglomerate lymphadenopathy within the   mediastinum.   CHEST WALL AND LOWER NECK: Peripherally calcified bilateral breast   implants.  VISUALIZED UPPER ABDOMEN: Within normal limits.  BONES: Degenerative changes.    IMPRESSION:     No pulmonary embolism.    2.0 cm mass lesion within the left ventricle adjacent to the posterior   mitral leaflet. Given its location, they may be causing poor mitral   inflow and resultant pulmonary edema. A cardiac MRI is pending .    Small bilateral pleural effusions.    < end of copied text >    < from: AMAN w/TTE (w/Cont, w/3D Echo) (12.07.17 @ 12:39) >  ------------------------------------------------------------------------  Conclusions:  1. Mild mitral annular calcification. There is a large mass  (measuring approximately 2.1 cm x 1.8 cm) in the left  ventricle ) that extends into the subvalvular apparatus and  involves the ventricular surface of both leaflets of the  mitral valve causing significant obstruction of mitral  inflow. The mass likely represents tumor. Mild mitral  regurgitation. Peak mitral valve gradient equals 19 mm Hg,  mean transmitral valve gradient equals 12-13 mm Hg,  consistent with severe mitral stenosis. (HRabout 80s-90s  bpm) Mean gradient about 24 mmHg with HRabout 100-110 bpm  during TTE.  2. Calcified trileaflet aortic valve with normal opening.  Minimal aortic regurgitation.  3. Normal aortic root and ascending aorta. (Ao: 2.7 cm at  the sinuses of Valsalva). Simple, nonmobile atheroma noted  in the aortic arch and descending aorta.  4. Normal left atrium.  LA volume index = 18 cc/m2. No left  atrial or left atrial appendage thrombus. Normal left  atrial appendage function (velocity> 40 cm/s).  5. Hyperdynamic left ventricular systolic function. No  evidence of LVOT obstruction.  6. Normal diastolic function  7. Mild right atrial enlargement. Linear, mobile  echodensities are seen arising by the right atrial  appendage could represent thrombi, tumor, or vegetation.  8. Right ventricular enlargement with normal right  ventricular systolic function.  9. Agitated saline injection and color flow doppler  demonstrate no evidence of a patent foramen ovale.  10. Trace pericardialeffusion.  11. Bilateral pleural effusions    < end of copied text >

## 2017-12-12 LAB
ANION GAP SERPL CALC-SCNC: 12 MMOL/L — SIGNIFICANT CHANGE UP (ref 5–17)
APTT BLD: 35.4 SEC — SIGNIFICANT CHANGE UP (ref 27.5–37.4)
BASOPHILS # BLD AUTO: 0.02 K/UL — SIGNIFICANT CHANGE UP (ref 0–0.2)
BASOPHILS NFR BLD AUTO: 0.1 % — SIGNIFICANT CHANGE UP (ref 0–2)
BUN SERPL-MCNC: 13 MG/DL — SIGNIFICANT CHANGE UP (ref 7–23)
CALCIUM SERPL-MCNC: 8.7 MG/DL — SIGNIFICANT CHANGE UP (ref 8.4–10.5)
CHLORIDE SERPL-SCNC: 102 MMOL/L — SIGNIFICANT CHANGE UP (ref 96–108)
CO2 SERPL-SCNC: 27 MMOL/L — SIGNIFICANT CHANGE UP (ref 22–31)
CREAT SERPL-MCNC: 0.81 MG/DL — SIGNIFICANT CHANGE UP (ref 0.5–1.3)
EOSINOPHIL # BLD AUTO: 0.24 K/UL — SIGNIFICANT CHANGE UP (ref 0–0.5)
EOSINOPHIL NFR BLD AUTO: 1.6 — SIGNIFICANT CHANGE UP
GLUCOSE SERPL-MCNC: 100 MG/DL — HIGH (ref 70–99)
HBA1C BLD-MCNC: 5.8 % — HIGH (ref 4–5.6)
HCT VFR BLD CALC: 31.9 % — LOW (ref 34.5–45)
HGB BLD-MCNC: 10 G/DL — LOW (ref 11.5–15.5)
IMM GRANULOCYTES NFR BLD AUTO: 1.4 % — SIGNIFICANT CHANGE UP (ref 0–1.5)
LYMPHOCYTES # BLD AUTO: 1.91 K/UL — SIGNIFICANT CHANGE UP (ref 1–3.3)
LYMPHOCYTES # BLD AUTO: 13 % — SIGNIFICANT CHANGE UP (ref 13–44)
MCHC RBC-ENTMCNC: 27.5 PG — SIGNIFICANT CHANGE UP (ref 27–34)
MCHC RBC-ENTMCNC: 31.3 GM/DL — LOW (ref 32–36)
MCV RBC AUTO: 87.6 FL — SIGNIFICANT CHANGE UP (ref 80–100)
MONOCYTES # BLD AUTO: 0.97 K/UL — HIGH (ref 0–0.9)
MONOCYTES NFR BLD AUTO: 6.6 % — SIGNIFICANT CHANGE UP (ref 2–14)
NEUTROPHILS # BLD AUTO: 11.34 K/UL — HIGH (ref 1.8–7.4)
NEUTROPHILS NFR BLD AUTO: 77.3 % — HIGH (ref 43–77)
PLATELET # BLD AUTO: 701 K/UL — HIGH (ref 150–400)
POTASSIUM SERPL-MCNC: 5 MMOL/L — SIGNIFICANT CHANGE UP (ref 3.5–5.3)
POTASSIUM SERPL-SCNC: 5 MMOL/L — SIGNIFICANT CHANGE UP (ref 3.5–5.3)
RBC # BLD: 3.64 M/UL — LOW (ref 3.8–5.2)
RBC # FLD: 18.4 % — HIGH (ref 10.3–14.5)
SODIUM SERPL-SCNC: 141 MMOL/L — SIGNIFICANT CHANGE UP (ref 135–145)
WBC # BLD: 14.69 K/UL — HIGH (ref 3.8–10.5)
WBC # FLD AUTO: 14.69 K/UL — HIGH (ref 3.8–10.5)

## 2017-12-12 PROCEDURE — 99232 SBSQ HOSP IP/OBS MODERATE 35: CPT

## 2017-12-12 PROCEDURE — 75561 CARDIAC MRI FOR MORPH W/DYE: CPT | Mod: 26

## 2017-12-12 RX ORDER — BUDESONIDE AND FORMOTEROL FUMARATE DIHYDRATE 160; 4.5 UG/1; UG/1
2 AEROSOL RESPIRATORY (INHALATION)
Qty: 0 | Refills: 0 | Status: DISCONTINUED | OUTPATIENT
Start: 2017-12-12 | End: 2017-12-14

## 2017-12-12 RX ORDER — FUROSEMIDE 40 MG
20 TABLET ORAL DAILY
Qty: 0 | Refills: 0 | Status: DISCONTINUED | OUTPATIENT
Start: 2017-12-13 | End: 2017-12-13

## 2017-12-12 RX ADMIN — Medication 12.5 MILLIGRAM(S): at 18:31

## 2017-12-12 RX ADMIN — BUDESONIDE AND FORMOTEROL FUMARATE DIHYDRATE 2 PUFF(S): 160; 4.5 AEROSOL RESPIRATORY (INHALATION) at 18:31

## 2017-12-12 RX ADMIN — ENOXAPARIN SODIUM 60 MILLIGRAM(S): 100 INJECTION SUBCUTANEOUS at 07:41

## 2017-12-12 RX ADMIN — Medication 50 MICROGRAM(S): at 07:41

## 2017-12-12 RX ADMIN — Medication 12.5 MILLIGRAM(S): at 07:41

## 2017-12-12 RX ADMIN — PANTOPRAZOLE SODIUM 40 MILLIGRAM(S): 20 TABLET, DELAYED RELEASE ORAL at 07:41

## 2017-12-12 RX ADMIN — ATORVASTATIN CALCIUM 40 MILLIGRAM(S): 80 TABLET, FILM COATED ORAL at 23:24

## 2017-12-12 RX ADMIN — Medication 40 MILLIGRAM(S): at 07:43

## 2017-12-12 RX ADMIN — Medication 1 MILLIGRAM(S): at 12:53

## 2017-12-12 RX ADMIN — Medication 325 MILLIGRAM(S): at 11:34

## 2017-12-12 RX ADMIN — ENOXAPARIN SODIUM 60 MILLIGRAM(S): 100 INJECTION SUBCUTANEOUS at 18:31

## 2017-12-12 RX ADMIN — Medication 0.5 MILLIGRAM(S): at 07:42

## 2017-12-12 RX ADMIN — Medication 3 MILLILITER(S): at 07:42

## 2017-12-12 RX ADMIN — Medication 3 MILLILITER(S): at 00:10

## 2017-12-12 RX ADMIN — LORATADINE 10 MILLIGRAM(S): 10 TABLET ORAL at 11:34

## 2017-12-12 NOTE — PROGRESS NOTE ADULT - ASSESSMENT
A/P: 77 year old woman with pmhx RA, migraines, HLD, hypothyroidism transferred from OSH after mass identified on echocardiogram, confirmed on AMAN at [facility] with large mass L ventricle with mitral obstruction concerning for tumor, and right atrial appendage mobile echodensities. CT head performed which suggested right frontal infarcts. MRI brain performed yesterday disclosed multiple tiny scattered supratentorial, right greater than left and infratentorial, tiny cerebellar, infarcts concerning for embolic etiology. CT chest no PE, right apical opacity, bilateral pleural effusions. Concern for underlining malignancy. Awaiting cardiac MRI. Neurological examination nonlateralizing. NIHSS=0.    Etiology of infarcts likely cardioembolic, in setting of r/o cardiac tumor r/o endocarditis, possible hypercoaguability of cancer    Cardiac MRI  A nodular mass with an irregular contour extends the perimeter of   posterior mitral valve leaflet measuring approximately 27 mm x 10 mm.    Posterior mitral valve leaflet excursion appears decreased secondary to   the associated mass resulting in turbulent mitral inflow.  The   differential diagnosis includes rheumatoid nodules with overlying   thrombus, superimposed infective endocarditis and tumor.    Neuro stable    Plan:  Continue AC with Lovenox as per cardiology  Follow up repeat cardiac MRI  Further management of cardiac mass as per cardiology/CTSurg  Continue Neurochecks  Follow up blood cultures  Continue Abx for UTI  Heme/Onc following  Close observation  Will follow    Plan reviewed with pt and daughter at bedside

## 2017-12-12 NOTE — PROGRESS NOTE ADULT - ASSESSMENT
76 yo F with RA, Asthma, pulmonary HTN, HLD who presented to [facility] with weakness and dyspnea. Found to have large LV mass, transferred to [facility] for further mgmt. Further findings of acute/subacute CVA concerning for cardioembolic source, CT chest with pulmonary edema. ESBL E.Coli UTI. Severe mitral stenosis 2nd mass. RA with mobile echodensities.

## 2017-12-12 NOTE — PROGRESS NOTE ADULT - SUBJECTIVE AND OBJECTIVE BOX
Patient is a 77y old  Female who presents with a chief complaint of transfer for further management (10 Dec 2017 08:52)      SUBJECTIVE / OVERNIGHT EVENTS:  No chest pain. No shortness of breath. No complaints. No events overnight.     MEDICATIONS  (STANDING):  aspirin 325 milliGRAM(s) Oral daily  atorvastatin 40 milliGRAM(s) Oral at bedtime  buDESOnide 160 MICROgram(s)/formoterol 4.5 MICROgram(s) Inhaler 2 Puff(s) Inhalation two times a day  enoxaparin Injectable 60 milliGRAM(s) SubCutaneous every 12 hours  levothyroxine 50 MICROGram(s) Oral daily  loratadine 10 milliGRAM(s) Oral daily  metoprolol     tartrate 12.5 milliGRAM(s) Oral two times a day  pantoprazole    Tablet 40 milliGRAM(s) Oral before breakfast    MEDICATIONS  (PRN):  acetaminophen   Tablet 650 milliGRAM(s) Oral every 6 hours PRN For Temp greater than 38.5 C (101.3 F)  acetaminophen   Tablet. 650 milliGRAM(s) Oral every 6 hours PRN Mild Pain (1 - 3)  ALBUTerol    90 MICROgram(s) HFA Inhaler 2 Puff(s) Inhalation every 6 hours PRN Shortness of Breath and/or Wheezing      Vital Signs Last 24 Hrs  T(C): 36.4 (12 Dec 2017 14:35), Max: 36.5 (12 Dec 2017 04:46)  T(F): 97.5 (12 Dec 2017 14:35), Max: 97.7 (12 Dec 2017 04:46)  HR: 94 (12 Dec 2017 14:35) (93 - 101)  BP: 116/72 (12 Dec 2017 14:35) (103/64 - 129/84)  BP(mean): --  RR: 18 (12 Dec 2017 14:35) (17 - 18)  SpO2: 94% (12 Dec 2017 14:35) (94% - 98%)  CAPILLARY BLOOD GLUCOSE        I&O's Summary    11 Dec 2017 07:01  -  12 Dec 2017 07:00  --------------------------------------------------------  IN: 300 mL / OUT: 0 mL / NET: 300 mL    12 Dec 2017 07:01  -  12 Dec 2017 15:03  --------------------------------------------------------  IN: 100 mL / OUT: 0 mL / NET: 100 mL        PHYSICAL EXAM:  GENERAL: NAD, well-developed  HEAD:  Atraumatic, Normocephalic  EYES: EOMI, PERRLA, conjunctiva and sclera clear  NECK: Supple, No JVD  CHEST/LUNG: Clear to auscultation bilaterally; No wheeze  HEART: Regular rate and rhythm; No murmurs, rubs, or gallops  ABDOMEN: Soft, Nontender, Nondistended; Bowel sounds present  EXTREMITIES:  2+ Peripheral Pulses, No clubbing, cyanosis, or edema  PSYCH: AAOx3  NEUROLOGY: non-focal  SKIN: No rashes or lesions    LABS:                        10.0   14.69 )-----------( 701      ( 12 Dec 2017 08:44 )             31.9     12-12    141  |  102  |  13  ----------------------------<  100<H>  5.0   |  27  |  0.81    Ca    8.7      12 Dec 2017 08:44      PT/INR - ( 11 Dec 2017 05:56 )   PT: 13.4 sec;   INR: 1.22 ratio         PTT - ( 12 Dec 2017 08:44 )  PTT:35.4 sec          RADIOLOGY & ADDITIONAL TESTS:    Imaging Personally Reviewed:    Consultant(s) Notes Reviewed:      Care Discussed with Consultants/Other Providers:

## 2017-12-12 NOTE — PROGRESS NOTE ADULT - PROBLEM SELECTOR PLAN 1
likely 2nd severe mitral inflow obstruction 2nd LV mass   -Diuresis with Lasix as tolerated   -Dyspnea improved with diuresis likely 2nd severe mitral inflow obstruction 2nd LV mass   -decrease lasix 20mg /d  -Dyspnea improved with diuresis

## 2017-12-12 NOTE — PROGRESS NOTE ADULT - ASSESSMENT
78 yo F with RA, Asthma, pulmonary HTN, HLD who presented to [facility] with weakness and dyspnea. Found to have large LV mass, transferred to [facility] for further mgmt. Further findings of acute/subacute CVA concerning for cardioembolic source, CT chest with pulmonary edema. ESBL E.Coli UTI. Severe mitral stenosis 2nd mass. RA with mobile echodensities.

## 2017-12-12 NOTE — PROGRESS NOTE ADULT - SUBJECTIVE AND OBJECTIVE BOX
Follow-up Pulm Progress Note    No new respiratory events overnight.  Denies SOB/CP.     Medications:  MEDICATIONS  (STANDING):  ALBUTerol/ipratropium for Nebulization 3 milliLiter(s) Nebulizer every 6 hours  aspirin 325 milliGRAM(s) Oral daily  atorvastatin 40 milliGRAM(s) Oral at bedtime  buDESOnide   0.5 milliGRAM(s) Respule 0.5 milliGRAM(s) Inhalation every 12 hours  enoxaparin Injectable 60 milliGRAM(s) SubCutaneous every 12 hours  furosemide    Tablet 40 milliGRAM(s) Oral daily  levothyroxine 50 MICROGram(s) Oral daily  loratadine 10 milliGRAM(s) Oral daily  LORazepam   Injectable 1 milliGRAM(s) IV Push once  metoprolol     tartrate 12.5 milliGRAM(s) Oral two times a day  pantoprazole    Tablet 40 milliGRAM(s) Oral before breakfast    MEDICATIONS  (PRN):  acetaminophen   Tablet 650 milliGRAM(s) Oral every 6 hours PRN For Temp greater than 38.5 C (101.3 F)  acetaminophen   Tablet. 650 milliGRAM(s) Oral every 6 hours PRN Mild Pain (1 - 3)  ALBUTerol    90 MICROgram(s) HFA Inhaler 2 Puff(s) Inhalation every 6 hours PRN Shortness of Breath and/or Wheezing          Vital Signs Last 24 Hrs  T(C): 36.5 (12 Dec 2017 04:46), Max: 36.5 (12 Dec 2017 04:46)  T(F): 97.7 (12 Dec 2017 04:46), Max: 97.7 (12 Dec 2017 04:46)  HR: 94 (12 Dec 2017 04:46) (93 - 101)  BP: 129/84 (12 Dec 2017 04:46) (103/64 - 129/84)  BP(mean): --  RR: 17 (12 Dec 2017 04:46) (17 - 18)  SpO2: 97% (12 Dec 2017 04:46) (96% - 98%)          12-11 @ 07:01  -  12-12 @ 07:00  --------------------------------------------------------  IN: 300 mL / OUT: 0 mL / NET: 300 mL          LABS:                        10.0   14.69 )-----------( 701      ( 12 Dec 2017 08:44 )             31.9     12-12    141  |  102  |  13  ----------------------------<  100<H>  5.0   |  27  |  0.81    Ca    8.7      12 Dec 2017 08:44            CAPILLARY BLOOD GLUCOSE        PT/INR - ( 11 Dec 2017 05:56 )   PT: 13.4 sec;   INR: 1.22 ratio         PTT - ( 12 Dec 2017 08:44 )  PTT:35.4 sec                    CULTURES: (if applicable)          Physical Examination:  PULM: decreased bs bilaterally, no significant sputum production  CVS: S1, S2 heard    RADIOLOGY REVIEWED  CXR: < from: Xray Chest 1 View AP -PORTABLE-Routine (12.11.17 @ 05:43) >  IMPRESSION:   Mild pulmonary edema with trace right pleural effusion.    < end of copied text >      < from: MR Cardiac No Cont (12.08.17 @ 11:04) >  IMPRESSION:   CMR terminated prior to completion of the study due to patient   preference; degraded image quality due to respiratory motion.  Limited   CMR assessment.  1.  A nodular mass with an irregular contour extends the perimeter of   posterior mitral valve leaflet measuring approximately 27 mm x 10 mm.    Posterior mitral valve leaflet excursion appears decreased secondary to   the associated mass resulting in turbulent mitral inflow.  The   differential diagnosis includes rheumatoid nodules with overlying   thrombus, superimposed infective endocarditis and tumor.  2.  Right ventricle appears qualitatively dilated and hypokinetic.  The   interventricular septum flattens in early diastole consistent with right   ventricular volume overload.  3.  Small to moderate bilateral pleural effusions.    < end of copied text >      TTE:< from: AMAN w/TTE (w/Cont, w/3D Echo) (12.07.17 @ 12:39) >  tricuspid regurgitation. Pulmonic valve not well  visualized, probably normal. Minimal pulmonic  regurgitation.  Pericardium/Pleura: Trace pericardial effusion.  Bilateral pleural effusions.  Hemodynamic: Estimated right atrial pressure is 8 mm Hg.  Estimated right ventricular systolic pressure equals 53 mm  Hg, assuming right atrial pressure equals 8 mm Hg,  consistent with moderate pulmonary hypertension. Agitated  saline injection and color flow doppler demonstrate no  evidence of a patent foramen ovale.  ------------------------------------------------------------------------  Conclusions:  1. Mild mitral annular calcification. There is a large mass  (measuring approximately 2.1 cm x 1.8 cm) in the left  ventricle ) that extends into the subvalvular apparatus and  involves the ventricular surface of both leaflets of the  mitral valve causing significant obstruction of mitral  inflow. The mass likely represents tumor. Mild mitral  regurgitation. Peak mitral valve gradient equals 19 mm Hg,  mean transmitral valve gradient equals 12-13 mm Hg,  consistent with severe mitral stenosis. (HRabout 80s-90s  bpm) Mean gradient about 24 mmHg with HRabout 100-110 bpm  during TTE.  2. Calcified trileaflet aortic valve with normal opening.  Minimal aortic regurgitation.  3. Normal aortic root and ascending aorta. (Ao: 2.7 cm at  the sinuses of Valsalva). Simple, nonmobile atheroma noted  in the aortic arch and descending aorta.  4. Normal left atrium.  LA volume index = 18 cc/m2. No left  atrial or left atrial appendage thrombus. Normal left  atrial appendage function (velocity> 40 cm/s).  5. Hyperdynamic left ventricular systolic function. No  evidence of LVOT obstruction.  6. Normal diastolic function  7. Mild right atrial enlargement. Linear, mobile  echodensities are seen arising by the right atrial  appendage could represent thrombi, tumor, or vegetation.  8. Right ventricular enlargement with normal right  ventricular systolic function.  9. Agitated saline injection and color flow doppler  demonstrate no evidence of a patent foramen ovale.  10. Trace pericardialeffusion.  11. Bilateral pleural effusions.  Consider cardiac MRI for tissue characterization of the  large LV mass and the linear, mobile echodensities in the  right atrium.    < end of copied text > Follow-up Pulm Progress Note    No new respiratory events overnight.  Denies SOB/CP.     Medications:  MEDICATIONS  (STANDING):  ALBUTerol/ipratropium for Nebulization 3 milliLiter(s) Nebulizer every 6 hours  aspirin 325 milliGRAM(s) Oral daily  atorvastatin 40 milliGRAM(s) Oral at bedtime  buDESOnide   0.5 milliGRAM(s) Respule 0.5 milliGRAM(s) Inhalation every 12 hours  enoxaparin Injectable 60 milliGRAM(s) SubCutaneous every 12 hours  furosemide    Tablet 40 milliGRAM(s) Oral daily  levothyroxine 50 MICROGram(s) Oral daily  loratadine 10 milliGRAM(s) Oral daily  LORazepam   Injectable 1 milliGRAM(s) IV Push once  metoprolol     tartrate 12.5 milliGRAM(s) Oral two times a day  pantoprazole    Tablet 40 milliGRAM(s) Oral before breakfast    MEDICATIONS  (PRN):  acetaminophen   Tablet 650 milliGRAM(s) Oral every 6 hours PRN For Temp greater than 38.5 C (101.3 F)  acetaminophen   Tablet. 650 milliGRAM(s) Oral every 6 hours PRN Mild Pain (1 - 3)  ALBUTerol    90 MICROgram(s) HFA Inhaler 2 Puff(s) Inhalation every 6 hours PRN Shortness of Breath and/or Wheezing    Vital Signs Last 24 Hrs  T(C): 36.5 (12 Dec 2017 04:46), Max: 36.5 (12 Dec 2017 04:46)  T(F): 97.7 (12 Dec 2017 04:46), Max: 97.7 (12 Dec 2017 04:46)  HR: 94 (12 Dec 2017 04:46) (93 - 101)  BP: 129/84 (12 Dec 2017 04:46) (103/64 - 129/84)  BP(mean): --  RR: 17 (12 Dec 2017 04:46) (17 - 18)  SpO2: 97% (12 Dec 2017 04:46) (96% - 98%)    12-11 @ 07:01  -  12-12 @ 07:00  --------------------------------------------------------  IN: 300 mL / OUT: 0 mL / NET: 300 mL    LABS:                        10.0   14.69 )-----------( 701      ( 12 Dec 2017 08:44 )             31.9     12-12    141  |  102  |  13  ----------------------------<  100<H>  5.0   |  27  |  0.81    Ca    8.7      12 Dec 2017 08:44    CAPILLARY BLOOD GLUCOSE        PT/INR - ( 11 Dec 2017 05:56 )   PT: 13.4 sec;   INR: 1.22 ratio         PTT - ( 12 Dec 2017 08:44 )  PTT:35.4 sec    CULTURES: (if applicable)    Physical Examination:  PULM: decreased bs bilaterally, no significant sputum production  CVS: S1, S2 heard    RADIOLOGY REVIEWED  CXR: < from: Xray Chest 1 View AP -PORTABLE-Routine (12.11.17 @ 05:43) >  IMPRESSION:   Mild pulmonary edema with trace right pleural effusion.    < end of copied text >      < from: MR Cardiac No Cont (12.08.17 @ 11:04) >  IMPRESSION:   CMR terminated prior to completion of the study due to patient   preference; degraded image quality due to respiratory motion.  Limited   CMR assessment.  1.  A nodular mass with an irregular contour extends the perimeter of   posterior mitral valve leaflet measuring approximately 27 mm x 10 mm.    Posterior mitral valve leaflet excursion appears decreased secondary to   the associated mass resulting in turbulent mitral inflow.  The   differential diagnosis includes rheumatoid nodules with overlying   thrombus, superimposed infective endocarditis and tumor.  2.  Right ventricle appears qualitatively dilated and hypokinetic.  The   interventricular septum flattens in early diastole consistent with right   ventricular volume overload.  3.  Small to moderate bilateral pleural effusions.    < end of copied text >      TTE:< from: AMAN w/TTE (w/Cont, w/3D Echo) (12.07.17 @ 12:39) >  tricuspid regurgitation. Pulmonic valve not well  visualized, probably normal. Minimal pulmonic  regurgitation.  Pericardium/Pleura: Trace pericardial effusion.  Bilateral pleural effusions.  Hemodynamic: Estimated right atrial pressure is 8 mm Hg.  Estimated right ventricular systolic pressure equals 53 mm  Hg, assuming right atrial pressure equals 8 mm Hg,  consistent with moderate pulmonary hypertension. Agitated  saline injection and color flow doppler demonstrate no  evidence of a patent foramen ovale.  ------------------------------------------------------------------------  Conclusions:  1. Mild mitral annular calcification. There is a large mass  (measuring approximately 2.1 cm x 1.8 cm) in the left  ventricle ) that extends into the subvalvular apparatus and  involves the ventricular surface of both leaflets of the  mitral valve causing significant obstruction of mitral  inflow. The mass likely represents tumor. Mild mitral  regurgitation. Peak mitral valve gradient equals 19 mm Hg,  mean transmitral valve gradient equals 12-13 mm Hg,  consistent with severe mitral stenosis. (HRabout 80s-90s  bpm) Mean gradient about 24 mmHg with HRabout 100-110 bpm  during TTE.  2. Calcified trileaflet aortic valve with normal opening.  Minimal aortic regurgitation.  3. Normal aortic root and ascending aorta. (Ao: 2.7 cm at  the sinuses of Valsalva). Simple, nonmobile atheroma noted  in the aortic arch and descending aorta.  4. Normal left atrium.  LA volume index = 18 cc/m2. No left  atrial or left atrial appendage thrombus. Normal left  atrial appendage function (velocity> 40 cm/s).  5. Hyperdynamic left ventricular systolic function. No  evidence of LVOT obstruction.  6. Normal diastolic function  7. Mild right atrial enlargement. Linear, mobile  echodensities are seen arising by the right atrial  appendage could represent thrombi, tumor, or vegetation.  8. Right ventricular enlargement with normal right  ventricular systolic function.  9. Agitated saline injection and color flow doppler  demonstrate no evidence of a patent foramen ovale.  10. Trace pericardialeffusion.  11. Bilateral pleural effusions.  Consider cardiac MRI for tissue characterization of the  large LV mass and the linear, mobile echodensities in the  right atrium.    < end of copied text >

## 2017-12-12 NOTE — PROGRESS NOTE ADULT - PROBLEM SELECTOR PLAN 10
pulmory fu  Hx rheumatoid Ds and Rx adv rxn in past, I will try to clarify
prob should see Heme after Dx if thrombotic
see meds

## 2017-12-12 NOTE — PROGRESS NOTE ADULT - SUBJECTIVE AND OBJECTIVE BOX
SUBJECTIVE: No new neurologic events overnight.  No new neurologic complaints.    resting sedated after cardiac MRI  daughter at bedside    Medications:  acetaminophen   Tablet 650 milliGRAM(s) Oral every 6 hours PRN  acetaminophen   Tablet. 650 milliGRAM(s) Oral every 6 hours PRN  ALBUTerol    90 MICROgram(s) HFA Inhaler 2 Puff(s) Inhalation every 6 hours PRN  aspirin 325 milliGRAM(s) Oral daily  atorvastatin 40 milliGRAM(s) Oral at bedtime  buDESOnide 160 MICROgram(s)/formoterol 4.5 MICROgram(s) Inhaler 2 Puff(s) Inhalation two times a day  enoxaparin Injectable 60 milliGRAM(s) SubCutaneous every 12 hours  levothyroxine 50 MICROGram(s) Oral daily  loratadine 10 milliGRAM(s) Oral daily  metoprolol     tartrate 12.5 milliGRAM(s) Oral two times a day  pantoprazole    Tablet 40 milliGRAM(s) Oral before breakfast    Labs:  CBC Full  -  ( 12 Dec 2017 08:44 )  WBC Count : 14.69 K/uL  Hemoglobin : 10.0 g/dL  Hematocrit : 31.9 %  Platelet Count - Automated : 701 K/uL  Mean Cell Volume : 87.6 fl  Mean Cell Hemoglobin : 27.5 pg  Mean Cell Hemoglobin Concentration : 31.3 gm/dL  Auto Neutrophil # : 11.34 K/uL  Auto Lymphocyte # : 1.91 K/uL  Auto Monocyte # : 0.97 K/uL  Auto Eosinophil # : 0.24 K/uL  Auto Basophil # : 0.02 K/uL  Auto Neutrophil % : 77.3 %  Auto Lymphocyte % : 13.0 %  Auto Monocyte % : 6.6 %  Auto Eosinophil % : 1.6  Auto Basophil % : 0.1 %  12-12    141  |  102  |  13  ----------------------------<  100<H>  5.0   |  27  |  0.81  Ca    8.7      12 Dec 2017 08:44  PT/INR - ( 11 Dec 2017 05:56 )   PT: 13.4 sec;   INR: 1.22 ratio    PTT - ( 12 Dec 2017 08:44 )  PTT:35.4 sec    Vitals:  Vital Signs Last 24 Hrs  T(C): 36.4 (12 Dec 2017 14:35), Max: 36.5 (12 Dec 2017 04:46)  T(F): 97.5 (12 Dec 2017 14:35), Max: 97.7 (12 Dec 2017 04:46)  HR: 94 (12 Dec 2017 14:35) (93 - 94)  BP: 116/72 (12 Dec 2017 14:35) (103/64 - 129/84)  BP(mean): --  RR: 18 (12 Dec 2017 14:35) (17 - 18)  SpO2: 94% (12 Dec 2017 14:35) (94% - 98%)    NEUROLOGICAL EXAM:    Mental status: Awake, alert, and in no apparent distress. Oriented x 3. Language function is normal. Recent memory, digit span and concentration were normal.     Cranial Nerves: Pupils were equal, round, reactive to light. Extraocular movements were intact. Visual field were full. Fundoscopic exam was deferred. Facial sensation was intact to light touch. There was no facial asymmetry. The palate was upgoing symmetrically and tongue was midline. Hearing acuity was intact to finger rub AU. Shoulder shrug was full bilaterally    Motor exam: Bulk and tone were normal. Strength was 5/5 in all four extremities. Fine finger movements were symmetric and normal. There was no pronator drift    Reflexes: DTRs normoactive in all extremities. Toes were downgoing bilaterally.     Sensation: Intact to light touch, temperature.     Coordination: Finger-nose-finger without dysmetria.     Gait: deferred    NIHSS=0.    < from: CT Angio Chest w/ IV Cont (12.07.17 @ 21:52) >  INTERPRETATION:  no pulmonary embolism. redemonstrated interlobular   septal wall thickening and ground glass opacities, likely representing   pulmonary edema. denser airspace opacity in right lung apex is new and   may represent pulmonary edema, however infection cannot be entirely   excluded. small bilateral pleural effusions, increased since 12/4/17.    < from: MR Head No Cont (12.06.17 @ 15:07) >  FINDINGS:  There are numerous small foci of diffusion restriction, some with   associated T2/FLAIR hyperintensity, bilaterally in the bilateral frontal,   parietal, occipital, left temporal lobes, and bilateral cerebellar   hemispheres, compatible with acute infarcts.    The ventricles and cortical sulci are within normal limits for age. No   intracranial hemorrhage, mass effect or midline shift. The basal cisterns   are patent.    Scattered foci of T2/FLAIR hyperintensity are noted in the   periventricular white matter, nonspecific but likely sequela of small   vessel ischemic disease.    The major intracranial flow voids at the skull base are preserved. The   paranasal sinuses and left mastoid air cells are well aerated. Scattered   fluid signal in the right mastoid air cells.    IMPRESSION:  Numerous small acute infarcts bilaterally in the supratentorial and   infratentorial compartments; consider central embolic phenomenon.      < from: CT Chest No Cont (12.04.17 @ 14:48) >    FINDINGS:    CHEST:     LUNGS AND LARGE AIRWAYS: Patent central airways.  Mild bilateral   interlobular septal thickening and groundglass opacity, consistent with   interstitial pulmonary edema. No pulmonary consolidation.  PLEURA: Trace bilateral pleural effusions.  VESSELS: Within normal limits.  HEART: Heart size is normal. No pericardial effusion.  MEDIASTINUM AND BIRD: No lymphadenopathy.  CHEST WALL AND LOWER NECK: Bilateral peripherally calcified breast   implants.  VISUALIZED UPPER ABDOMEN: Within normal limits.  BONES: Mild degenerative changes.    IMPRESSION: Mild interstitial pulmonary edema and trace bilateral pleural   effusions.    < from: CT Head No Cont (12.04.17 @ 14:42) >  Impression: No acute intracranial hemorrhage.     Small age indeterminate territorial infarct in the right high frontal   lobe. If clinically indicated, brain MR may be pursued for further   evaluation.    Mild chronic small vessel ischemic disease.    < end of copied text >  < from: AMAN w/TTE (w/Cont, w/3D Echo) (12.07.17 @ 12:39) >  Conclusions:  1. Mild mitral annular calcification. There is a large mass  (measuring approximately 2.1 cm x 1.8 cm) in the left  ventricle ) that extends into the subvalvular apparatus and  involves the ventricular surface of both leaflets of the  mitral valve causing significant obstruction of mitral  inflow. The mass likely represents tumor. Mild mitral  regurgitation. Peak mitral valve gradient equals 19 mm Hg,  mean transmitral valve gradient equals 12-13 mm Hg,  consistent with severe mitral stenosis. (HRabout 80s-90s  bpm) Mean gradient about 24 mmHg with HRabout 100-110 bpm  during TTE.  2. Calcified trileaflet aortic valve with normal opening.  Minimal aortic regurgitation.  3. Normal aortic root and ascending aorta. (Ao: 2.7 cm at  the sinuses of Valsalva). Simple, nonmobile atheroma noted  in the aortic arch and descending aorta.  4. Normal left atrium.  LA volume index = 18 cc/m2. No left  atrial or left atrial appendage thrombus. Normal left  atrial appendage function (velocity> 40 cm/s).  5. Hyperdynamic left ventricular systolic function. No  evidence of LVOT obstruction.  6. Normal diastolic function  7. Mild right atrial enlargement. Linear, mobile  echodensities are seen arising by the right atrial  appendage could represent thrombi, tumor, or vegetation.  8. Right ventricular enlargement with normal right  ventricular systolic function.  9. Agitated saline injection and color flow doppler  demonstrate no evidence of a patent foramen ovale.  10. Trace pericardialeffusion.  11. Bilateral pleural effusions.  Consider cardiac MRI for tissue characterization of the  large LV mass and the linear, mobile echodensities in the  right atrium.  *** No previous Echo exam.    < from: MR Cardiac No Cont (12.08.17 @ 11:04) >    IMPRESSION:   CMR terminated prior to completion of the study due to patient   preference; degraded image quality due to respiratory motion.  Limited   CMR assessment.  1.  A nodular mass with an irregular contour extends the perimeter of   posterior mitral valve leaflet measuring approximately 27 mm x 10 mm.    Posterior mitral valve leaflet excursion appears decreased secondary to   the associated mass resulting in turbulent mitral inflow.  The   differential diagnosis includes rheumatoid nodules with overlying   thrombus, superimposed infective endocarditis and tumor.  2.  Right ventricle appears qualitatively dilated and hypokinetic.  The   interventricular septum flattens in early diastole consistent with right   ventricular volume overload.  3.  Small to moderate bilateral pleural effusions.

## 2017-12-12 NOTE — PROGRESS NOTE ADULT - ASSESSMENT
77 F with cardiac mass  ·	No further PAT. Tolerating low dose beta blocker.   ·	Continue present therapy.   ·	  · 77 F with cardiac mass  ·	No further PAT. Tolerating low dose beta blocker.   ·	Continue present therapy.   ·	Patient agrees to try MRI again with mild sedation  ·	If unable to tolerate MRI will need to discuss options: biopsy, MRI with intubation, or course of treatment with repeat imaging  ·	D/w daughter at bedside.

## 2017-12-12 NOTE — PROGRESS NOTE ADULT - SUBJECTIVE AND OBJECTIVE BOX
HPI:from adm H+P:  78 yo F with rheumatoid arthritis, asthma, pulmomary HTN, HLD who presented to [facility] with weakness and dyspnea. The patient endorses that she was in her usual state of health until a few weeks ago when she developed back and shoulder pain after moving furniture was treated with prednisone.  Her symptoms did not resolve and received treatment with prednisone.  She was persistently feeling unwell and presented to the ER where she received hydration and was treated for a UTI and released.   She became concerned as she is typically very active and spends her summer at a swim club and can typically ascend stairs without dyspnea.  She returned to the emergency room at [facility] and underewent a TTE revealed an echodensity noted at the base of the posterior wall of the LV and attached to LV septum of unclear etiology.    Transferred to [facility] for further management.  Consults at [facility]:   Pulmonary  ID  Cardiology (06 Dec 2017 23:42)      Interval Events  abx dcd (UTI)  will need MRI (no order)  oob, feels ok, wants to go home  needs Dx  I spoke c Dr Degroot PCP at length yesterday, re Hx - she will get6 more info fro pulm/rheum and fu  ambulated c PT - see note      MEDICATIONS  (STANDING):  ALBUTerol/ipratropium for Nebulization 3 milliLiter(s) Nebulizer every 6 hours  aspirin 325 milliGRAM(s) Oral daily  atorvastatin 40 milliGRAM(s) Oral at bedtime  buDESOnide   0.5 milliGRAM(s) Respule 0.5 milliGRAM(s) Inhalation every 12 hours  enoxaparin Injectable 60 milliGRAM(s) SubCutaneous every 12 hours  furosemide    Tablet 40 milliGRAM(s) Oral daily  levothyroxine 50 MICROGram(s) Oral daily  loratadine 10 milliGRAM(s) Oral daily  LORazepam   Injectable 1 milliGRAM(s) IV Push once  metoprolol     tartrate 12.5 milliGRAM(s) Oral two times a day  pantoprazole    Tablet 40 milliGRAM(s) Oral before breakfast    MEDICATIONS  (PRN):  acetaminophen   Tablet 650 milliGRAM(s) Oral every 6 hours PRN For Temp greater than 38.5 C (101.3 F)  acetaminophen   Tablet. 650 milliGRAM(s) Oral every 6 hours PRN Mild Pain (1 - 3)  ALBUTerol    90 MICROgram(s) HFA Inhaler 2 Puff(s) Inhalation every 6 hours PRN Shortness of Breath and/or Wheezing      Patient is a 77y old  Female who presents with a chief complaint of transfer for further management (10 Dec 2017 08:52)      REVIEW OF SYSTEMS    General:nad feels ok wants to go home, apprehensive about MRI, wants sedation and   no HA/D  Skin/Breast:  Ophthalmologic:no ch vh  ENMT:	  Respiratory and Thorax:no ciyugh no sp no sob  Cardiovascular:no cp palp  Gastrointestinal: no nvcd  Genitourinary:no fdi  Musculoskeletal:	no pain  Neurological:	no co  Psychiatric:	  Hematology/Lymphatics:	  Endocrine:	  Allergic/Immunologic:  AOSN	+      Vital Signs Last 24 Hrs  T(C): 36.5 (12 Dec 2017 04:46), Max: 36.5 (12 Dec 2017 04:46)  T(F): 97.7 (12 Dec 2017 04:46), Max: 97.7 (12 Dec 2017 04:46)  HR: 94 (12 Dec 2017 04:46) (93 - 101)  BP: 129/84 (12 Dec 2017 04:46) (103/64 - 129/84)  BP(mean): --  RR: 17 (12 Dec 2017 04:46) (17 - 18)  SpO2: 97% (12 Dec 2017 04:46) (96% - 98%)    PHYSICAL EXAM:    Constitutional: nad vssa  H+N ncsai cwnl  ENMT:  Neck:  Breasts:  Back:  Respiratory:ctab norrw  Cardiovascular:rrr nom  Gastrointestinal:bs++ soft nt  Genitourinary:  Rectal:  Extremities:no cce  Vascular:  Neurological:nl x gait sl imp  Skin:  Lymph Nodes:  Musculoskeletal:  Psychiatric:nl    LABS  CBC Full  -  ( 12 Dec 2017 08:44 )  WBC Count : 14.69 K/uL  Hemoglobin : 10.0 g/dL  Hematocrit : 31.9 %  Platelet Count - Automated : 701 K/uL  Mean Cell Volume : 87.6 fl  Mean Cell Hemoglobin : 27.5 pg  Mean Cell Hemoglobin Concentration : 31.3 gm/dL  Auto Neutrophil # : 11.34 K/uL  Auto Lymphocyte # : 1.91 K/uL  Auto Monocyte # : 0.97 K/uL  Auto Eosinophil # : 0.24 K/uL  Auto Basophil # : 0.02 K/uL  Auto Neutrophil % : 77.3 %  Auto Lymphocyte % : 13.0 %  Auto Monocyte % : 6.6 %  Auto Eosinophil % : 1.6  Auto Basophil % : 0.1 %      12-12    141  |  102  |  13  ----------------------------<  100<H>  5.0   |  27  |  0.81    Ca    8.7      12 Dec 2017 08:44        PT/INR - ( 11 Dec 2017 05:56 )   PT: 13.4 sec;   INR: 1.22 ratio         PTT - ( 12 Dec 2017 08:44 )  PTT:35.4 sec    Imaging:    Xray:    Echo:    CT:    MRI:    Tele:    Orders:    MIRI Berrios [phone]

## 2017-12-12 NOTE — PROGRESS NOTE ADULT - SUBJECTIVE AND OBJECTIVE BOX
CC: Cardiac mass    Interval History: No significant cardiac events overnight.     MEDICATIONS:  acetaminophen   Tablet 650 milliGRAM(s) Oral every 6 hours PRN  acetaminophen   Tablet. 650 milliGRAM(s) Oral every 6 hours PRN  ALBUTerol    90 MICROgram(s) HFA Inhaler 2 Puff(s) Inhalation every 6 hours PRN  aspirin 325 milliGRAM(s) Oral daily  atorvastatin 40 milliGRAM(s) Oral at bedtime  buDESOnide 160 MICROgram(s)/formoterol 4.5 MICROgram(s) Inhaler 2 Puff(s) Inhalation two times a day  enoxaparin Injectable 60 milliGRAM(s) SubCutaneous every 12 hours  furosemide    Tablet 40 milliGRAM(s) Oral daily  levothyroxine 50 MICROGram(s) Oral daily  loratadine 10 milliGRAM(s) Oral daily  metoprolol     tartrate 12.5 milliGRAM(s) Oral two times a day  pantoprazole    Tablet 40 milliGRAM(s) Oral before breakfast      LABS:      141  |  102  |  13  ----------------------------<  100<H>  5.0   |  27  |  0.81    Ca    8.7      12 Dec 2017 08:44                            10.0   14.69 )-----------( 701      ( 12 Dec 2017 08:44 )             31.9     PT/INR - ( 11 Dec 2017 05:56 )   PT: 13.4 sec;   INR: 1.22 ratio         PTT - ( 12 Dec 2017 08:44 )  PTT:35.4 sec          VITAL SIGNS:   T(C): 36.5 (17 @ 04:46), Max: 36.5 (17 @ 04:46)  HR: 94 (17 @ 04:46) (93 - 101)  BP: 129/84 (17 @ 04:46) (103/64 - 129/84)  RR: 17 (17 @ 04:46) (17 - 18)  SpO2: 97% (17 @ 04:46) (96% - 98%)  Daily     Daily Weight in k.7 (12 Dec 2017 07:39)  I&O's Summary    11 Dec 2017 07:01  -  12 Dec 2017 07:00  --------------------------------------------------------  IN: 300 mL / OUT: 0 mL / NET: 300 mL    12 Dec 2017 07:01  -  12 Dec 2017 13:06  --------------------------------------------------------  IN: 100 mL / OUT: 0 mL / NET: 100 mL        TELE: No significant ectopy

## 2017-12-13 LAB
ANION GAP SERPL CALC-SCNC: 10 MMOL/L — SIGNIFICANT CHANGE UP (ref 5–17)
APPEARANCE UR: CLEAR — SIGNIFICANT CHANGE UP
BASOPHILS # BLD AUTO: 0 K/UL — SIGNIFICANT CHANGE UP (ref 0–0.2)
BASOPHILS NFR BLD AUTO: 0.1 % — SIGNIFICANT CHANGE UP (ref 0–2)
BILIRUB UR-MCNC: NEGATIVE — SIGNIFICANT CHANGE UP
BUN SERPL-MCNC: 12 MG/DL — SIGNIFICANT CHANGE UP (ref 7–23)
CALCIUM SERPL-MCNC: 8.4 MG/DL — SIGNIFICANT CHANGE UP (ref 8.4–10.5)
CHLORIDE SERPL-SCNC: 102 MMOL/L — SIGNIFICANT CHANGE UP (ref 96–108)
CO2 SERPL-SCNC: 30 MMOL/L — SIGNIFICANT CHANGE UP (ref 22–31)
COLOR SPEC: YELLOW — SIGNIFICANT CHANGE UP
CREAT SERPL-MCNC: 0.84 MG/DL — SIGNIFICANT CHANGE UP (ref 0.5–1.3)
DIFF PNL FLD: NEGATIVE — SIGNIFICANT CHANGE UP
EOSINOPHIL # BLD AUTO: 0.2 K/UL — SIGNIFICANT CHANGE UP (ref 0–0.5)
EOSINOPHIL NFR BLD AUTO: 1.6 % — SIGNIFICANT CHANGE UP (ref 0–6)
GLUCOSE SERPL-MCNC: 100 MG/DL — HIGH (ref 70–99)
GLUCOSE UR QL: NEGATIVE MG/DL — SIGNIFICANT CHANGE UP
HCT VFR BLD CALC: 31.1 % — LOW (ref 34.5–45)
HGB BLD-MCNC: 10.1 G/DL — LOW (ref 11.5–15.5)
INR BLD: 1.24 RATIO — HIGH (ref 0.88–1.16)
KETONES UR-MCNC: NEGATIVE — SIGNIFICANT CHANGE UP
LEUKOCYTE ESTERASE UR-ACNC: NEGATIVE — SIGNIFICANT CHANGE UP
LYMPHOCYTES # BLD AUTO: 17.1 % — SIGNIFICANT CHANGE UP (ref 13–44)
LYMPHOCYTES # BLD AUTO: 2.4 K/UL — SIGNIFICANT CHANGE UP (ref 1–3.3)
MCHC RBC-ENTMCNC: 29.3 PG — SIGNIFICANT CHANGE UP (ref 27–34)
MCHC RBC-ENTMCNC: 32.3 GM/DL — SIGNIFICANT CHANGE UP (ref 32–36)
MCV RBC AUTO: 90.5 FL — SIGNIFICANT CHANGE UP (ref 80–100)
MONOCYTES # BLD AUTO: 0.8 K/UL — SIGNIFICANT CHANGE UP (ref 0–0.9)
MONOCYTES NFR BLD AUTO: 6.1 % — SIGNIFICANT CHANGE UP (ref 2–14)
NEUTROPHILS # BLD AUTO: 10.3 K/UL — HIGH (ref 1.8–7.4)
NEUTROPHILS NFR BLD AUTO: 75.1 % — SIGNIFICANT CHANGE UP (ref 43–77)
NITRITE UR-MCNC: NEGATIVE — SIGNIFICANT CHANGE UP
PH UR: 6.5 — SIGNIFICANT CHANGE UP (ref 5–8)
PLATELET # BLD AUTO: 687 K/UL — HIGH (ref 150–400)
POTASSIUM SERPL-MCNC: 4.1 MMOL/L — SIGNIFICANT CHANGE UP (ref 3.5–5.3)
POTASSIUM SERPL-SCNC: 4.1 MMOL/L — SIGNIFICANT CHANGE UP (ref 3.5–5.3)
PROT UR-MCNC: NEGATIVE MG/DL — SIGNIFICANT CHANGE UP
PROTHROM AB SERPL-ACNC: 13.6 SEC — HIGH (ref 9.8–12.7)
RBC # BLD: 3.44 M/UL — LOW (ref 3.8–5.2)
RBC # FLD: 15.7 % — HIGH (ref 10.3–14.5)
SODIUM SERPL-SCNC: 142 MMOL/L — SIGNIFICANT CHANGE UP (ref 135–145)
SP GR SPEC: 1.01 — SIGNIFICANT CHANGE UP (ref 1.01–1.02)
UROBILINOGEN FLD QL: NEGATIVE MG/DL — SIGNIFICANT CHANGE UP
WBC # BLD: 13.8 K/UL — HIGH (ref 3.8–10.5)
WBC # FLD AUTO: 13.8 K/UL — HIGH (ref 3.8–10.5)

## 2017-12-13 PROCEDURE — 99232 SBSQ HOSP IP/OBS MODERATE 35: CPT

## 2017-12-13 RX ORDER — FUROSEMIDE 40 MG
40 TABLET ORAL DAILY
Qty: 0 | Refills: 0 | Status: DISCONTINUED | OUTPATIENT
Start: 2017-12-13 | End: 2017-12-14

## 2017-12-13 RX ADMIN — Medication 325 MILLIGRAM(S): at 11:40

## 2017-12-13 RX ADMIN — Medication 20 MILLIGRAM(S): at 06:29

## 2017-12-13 RX ADMIN — Medication 12.5 MILLIGRAM(S): at 18:15

## 2017-12-13 RX ADMIN — Medication 12.5 MILLIGRAM(S): at 06:29

## 2017-12-13 RX ADMIN — ENOXAPARIN SODIUM 60 MILLIGRAM(S): 100 INJECTION SUBCUTANEOUS at 18:15

## 2017-12-13 RX ADMIN — BUDESONIDE AND FORMOTEROL FUMARATE DIHYDRATE 2 PUFF(S): 160; 4.5 AEROSOL RESPIRATORY (INHALATION) at 06:30

## 2017-12-13 RX ADMIN — PANTOPRAZOLE SODIUM 40 MILLIGRAM(S): 20 TABLET, DELAYED RELEASE ORAL at 06:29

## 2017-12-13 RX ADMIN — Medication 50 MICROGRAM(S): at 06:29

## 2017-12-13 RX ADMIN — LORATADINE 10 MILLIGRAM(S): 10 TABLET ORAL at 11:40

## 2017-12-13 RX ADMIN — ATORVASTATIN CALCIUM 40 MILLIGRAM(S): 80 TABLET, FILM COATED ORAL at 22:23

## 2017-12-13 RX ADMIN — BUDESONIDE AND FORMOTEROL FUMARATE DIHYDRATE 2 PUFF(S): 160; 4.5 AEROSOL RESPIRATORY (INHALATION) at 18:15

## 2017-12-13 RX ADMIN — ENOXAPARIN SODIUM 60 MILLIGRAM(S): 100 INJECTION SUBCUTANEOUS at 06:29

## 2017-12-13 NOTE — PROGRESS NOTE ADULT - SUBJECTIVE AND OBJECTIVE BOX
Patient is a 77y old  Female who presents with a chief complaint of transfer for further management (10 Dec 2017 08:52)      SUBJECTIVE / OVERNIGHT EVENTS:  No chest pain. No shortness of breath. No complaints. No events overnight.     MEDICATIONS  (STANDING):  aspirin 325 milliGRAM(s) Oral daily  atorvastatin 40 milliGRAM(s) Oral at bedtime  buDESOnide 160 MICROgram(s)/formoterol 4.5 MICROgram(s) Inhaler 2 Puff(s) Inhalation two times a day  enoxaparin Injectable 60 milliGRAM(s) SubCutaneous every 12 hours  furosemide    Tablet 40 milliGRAM(s) Oral daily  levothyroxine 50 MICROGram(s) Oral daily  loratadine 10 milliGRAM(s) Oral daily  metoprolol     tartrate 12.5 milliGRAM(s) Oral two times a day  pantoprazole    Tablet 40 milliGRAM(s) Oral before breakfast    MEDICATIONS  (PRN):  acetaminophen   Tablet 650 milliGRAM(s) Oral every 6 hours PRN For Temp greater than 38.5 C (101.3 F)  acetaminophen   Tablet. 650 milliGRAM(s) Oral every 6 hours PRN Mild Pain (1 - 3)  ALBUTerol    90 MICROgram(s) HFA Inhaler 2 Puff(s) Inhalation every 6 hours PRN Shortness of Breath and/or Wheezing      Vital Signs Last 24 Hrs  T(C): 36.5 (13 Dec 2017 04:49), Max: 36.5 (13 Dec 2017 04:49)  T(F): 97.7 (13 Dec 2017 04:49), Max: 97.7 (13 Dec 2017 04:49)  HR: 97 (13 Dec 2017 04:49) (80 - 97)  BP: 125/76 (13 Dec 2017 04:49) (105/69 - 125/76)  BP(mean): --  RR: 18 (13 Dec 2017 04:49) (18 - 18)  SpO2: 95% (13 Dec 2017 04:49) (94% - 95%)  CAPILLARY BLOOD GLUCOSE        I&O's Summary    12 Dec 2017 07:01  -  13 Dec 2017 07:00  --------------------------------------------------------  IN: 860 mL / OUT: 500 mL / NET: 360 mL        PHYSICAL EXAM:  GENERAL: NAD, well-developed  HEAD:  Atraumatic, Normocephalic  EYES: EOMI, PERRLA, conjunctiva and sclera clear  NECK: Supple, No JVD  CHEST/LUNG: Clear to auscultation bilaterally; No wheeze  HEART: Regular rate and rhythm; No murmurs, rubs, or gallops  ABDOMEN: Soft, Nontender, Nondistended; Bowel sounds present  EXTREMITIES:  2+ Peripheral Pulses, No clubbing, cyanosis, or edema  PSYCH: AAOx3  NEUROLOGY: non-focal  SKIN: No rashes or lesions    LABS:                        10.1   13.8  )-----------( 687      ( 13 Dec 2017 05:21 )             31.1     12-13    142  |  102  |  12  ----------------------------<  100<H>  4.1   |  30  |  0.84    Ca    8.4      13 Dec 2017 05:21      PT/INR - ( 13 Dec 2017 05:21 )   PT: 13.6 sec;   INR: 1.24 ratio         PTT - ( 12 Dec 2017 08:44 )  PTT:35.4 sec          RADIOLOGY & ADDITIONAL TESTS:    Imaging Personally Reviewed:    Consultant(s) Notes Reviewed:      Care Discussed with Consultants/Other Providers:

## 2017-12-13 NOTE — PROGRESS NOTE ADULT - ASSESSMENT
77 F with cardiac mass  ·	Brief PAT. Tolerating low dose beta blocker. Increase lopressor to 25 mg BID.   ·	Continue present therapy.   ·	Patient unable to tolerate MRI.  Will need to discuss options: biopsy vs course of treatment with repeat imaging  ·	D/w Dr. Berrios

## 2017-12-13 NOTE — PROGRESS NOTE ADULT - ASSESSMENT
78 yo F with RA, Asthma, pulmonary HTN, HLD who presented to [facility] with weakness and dyspnea. Found to have large LV mass, transferred to [facility] for further mgmt. Further findings of acute/subacute CVA concerning for cardioembolic source, CT chest with pulmonary edema. ESBL E.Coli UTI. Severe mitral stenosis 2nd mass. RA with mobile echodensities.

## 2017-12-13 NOTE — PROGRESS NOTE ADULT - SUBJECTIVE AND OBJECTIVE BOX
SUBJECTIVE: No new neurologic events overnight.  No new neurologic complaints.    She denies focal weakness,numbness    Medications:  acetaminophen   Tablet 650 milliGRAM(s) Oral every 6 hours PRN  acetaminophen   Tablet. 650 milliGRAM(s) Oral every 6 hours PRN  ALBUTerol    90 MICROgram(s) HFA Inhaler 2 Puff(s) Inhalation every 6 hours PRN  aspirin 325 milliGRAM(s) Oral daily  atorvastatin 40 milliGRAM(s) Oral at bedtime  buDESOnide 160 MICROgram(s)/formoterol 4.5 MICROgram(s) Inhaler 2 Puff(s) Inhalation two times a day  enoxaparin Injectable 60 milliGRAM(s) SubCutaneous every 12 hours  furosemide    Tablet 20 milliGRAM(s) Oral daily  levothyroxine 50 MICROGram(s) Oral daily  loratadine 10 milliGRAM(s) Oral daily  metoprolol     tartrate 12.5 milliGRAM(s) Oral two times a day  pantoprazole    Tablet 40 milliGRAM(s) Oral before breakfast      Labs:  CBC Full  -  ( 13 Dec 2017 05:21 )  WBC Count : 13.8 K/uL  Hemoglobin : 10.1 g/dL  Hematocrit : 31.1 %  Platelet Count - Automated : 687 K/uL  Mean Cell Volume : 90.5 fl  Mean Cell Hemoglobin : 29.3 pg  Mean Cell Hemoglobin Concentration : 32.3 gm/dL  Auto Neutrophil # : 10.3 K/uL  Auto Lymphocyte # : 2.4 K/uL  Auto Monocyte # : 0.8 K/uL  Auto Eosinophil # : 0.2 K/uL  Auto Basophil # : 0.0 K/uL  Auto Neutrophil % : 75.1 %  Auto Lymphocyte % : 17.1 %  Auto Monocyte % : 6.1 %  Auto Eosinophil % : 1.6 %  Auto Basophil % : 0.1 %    12-13    142  |  102  |  12  ----------------------------<  100<H>  4.1   |  30  |  0.84    Ca    8.4      13 Dec 2017 05:21      CAPILLARY BLOOD GLUCOSE          PT/INR - ( 13 Dec 2017 05:21 )   PT: 13.6 sec;   INR: 1.24 ratio         PTT - ( 12 Dec 2017 08:44 )  PTT:35.4 sec        Vitals:  Vital Signs Last 24 Hrs  T(C): 36.5 (13 Dec 2017 04:49), Max: 36.5 (13 Dec 2017 04:49)  T(F): 97.7 (13 Dec 2017 04:49), Max: 97.7 (13 Dec 2017 04:49)  HR: 97 (13 Dec 2017 04:49) (80 - 97)  BP: 125/76 (13 Dec 2017 04:49) (105/69 - 125/76)  BP(mean): --  RR: 18 (13 Dec 2017 04:49) (18 - 18)  SpO2: 95% (13 Dec 2017 04:49) (94% - 95%)  NEUROLOGICAL EXAM:    Mental status: Awake, alert, and in no apparent distress. Oriented x 3. Language function is normal. Recent memory, digit span and concentration were normal.     Cranial Nerves: Pupils were equal, round, reactive to light. Extraocular movements were intact. Visual field were full. Fundoscopic exam was deferred. Facial sensation was intact to light touch. There was no facial asymmetry. The palate was upgoing symmetrically and tongue was midline. Hearing acuity was intact to finger rub AU. Shoulder shrug was full bilaterally    Motor exam: Bulk and tone were normal. Strength was 5/5 in all four extremities. Fine finger movements were symmetric and normal. There was no pronator drift    Reflexes: DTRs normoactive in all extremities. Toes were downgoing bilaterally.     Sensation: Intact to light touch, temperature.     Coordination: Finger-nose-finger without dysmetria.     Gait: deferred    NIHSS=0.    < from: CT Angio Chest w/ IV Cont (12.07.17 @ 21:52) >  INTERPRETATION:  no pulmonary embolism. redemonstrated interlobular   septal wall thickening and ground glass opacities, likely representing   pulmonary edema. denser airspace opacity in right lung apex is new and   may represent pulmonary edema, however infection cannot be entirely   excluded. small bilateral pleural effusions, increased since 12/4/17.    < from: MR Head No Cont (12.06.17 @ 15:07) >  FINDINGS:  There are numerous small foci of diffusion restriction, some with   associated T2/FLAIR hyperintensity, bilaterally in the bilateral frontal,   parietal, occipital, left temporal lobes, and bilateral cerebellar   hemispheres, compatible with acute infarcts.    The ventricles and cortical sulci are within normal limits for age. No   intracranial hemorrhage, mass effect or midline shift. The basal cisterns   are patent.    Scattered foci of T2/FLAIR hyperintensity are noted in the   periventricular white matter, nonspecific but likely sequela of small   vessel ischemic disease.    The major intracranial flow voids at the skull base are preserved. The   paranasal sinuses and left mastoid air cells are well aerated. Scattered   fluid signal in the right mastoid air cells.    IMPRESSION:  Numerous small acute infarcts bilaterally in the supratentorial and   infratentorial compartments; consider central embolic phenomenon.      < from: CT Chest No Cont (12.04.17 @ 14:48) >    FINDINGS:    CHEST:     LUNGS AND LARGE AIRWAYS: Patent central airways.  Mild bilateral   interlobular septal thickening and groundglass opacity, consistent with   interstitial pulmonary edema. No pulmonary consolidation.  PLEURA: Trace bilateral pleural effusions.  VESSELS: Within normal limits.  HEART: Heart size is normal. No pericardial effusion.  MEDIASTINUM AND BIRD: No lymphadenopathy.  CHEST WALL AND LOWER NECK: Bilateral peripherally calcified breast   implants.  VISUALIZED UPPER ABDOMEN: Within normal limits.  BONES: Mild degenerative changes.    IMPRESSION: Mild interstitial pulmonary edema and trace bilateral pleural   effusions.    < from: CT Head No Cont (12.04.17 @ 14:42) >  Impression: No acute intracranial hemorrhage.     Small age indeterminate territorial infarct in the right high frontal   lobe. If clinically indicated, brain MR may be pursued for further   evaluation.    Mild chronic small vessel ischemic disease.    < end of copied text >  < from: AMAN w/TTE (w/Cont, w/3D Echo) (12.07.17 @ 12:39) >  Conclusions:  1. Mild mitral annular calcification. There is a large mass  (measuring approximately 2.1 cm x 1.8 cm) in the left  ventricle ) that extends into the subvalvular apparatus and  involves the ventricular surface of both leaflets of the  mitral valve causing significant obstruction of mitral  inflow. The mass likely represents tumor. Mild mitral  regurgitation. Peak mitral valve gradient equals 19 mm Hg,  mean transmitral valve gradient equals 12-13 mm Hg,  consistent with severe mitral stenosis. (HRabout 80s-90s  bpm) Mean gradient about 24 mmHg with HRabout 100-110 bpm  during TTE.  2. Calcified trileaflet aortic valve with normal opening.  Minimal aortic regurgitation.  3. Normal aortic root and ascending aorta. (Ao: 2.7 cm at  the sinuses of Valsalva). Simple, nonmobile atheroma noted  in the aortic arch and descending aorta.  4. Normal left atrium.  LA volume index = 18 cc/m2. No left  atrial or left atrial appendage thrombus. Normal left  atrial appendage function (velocity> 40 cm/s).  5. Hyperdynamic left ventricular systolic function. No  evidence of LVOT obstruction.  6. Normal diastolic function  7. Mild right atrial enlargement. Linear, mobile  echodensities are seen arising by the right atrial  appendage could represent thrombi, tumor, or vegetation.  8. Right ventricular enlargement with normal right  ventricular systolic function.  9. Agitated saline injection and color flow doppler  demonstrate no evidence of a patent foramen ovale.  10. Trace pericardialeffusion.  11. Bilateral pleural effusions.  Consider cardiac MRI for tissue characterization of the  large LV mass and the linear, mobile echodensities in the  right atrium.  *** No previous Echo exam.    < from: MR Cardiac No Cont (12.08.17 @ 11:04) >    IMPRESSION:   CMR terminated prior to completion of the study due to patient   preference; degraded image quality due to respiratory motion.  Limited   CMR assessment.  1.  A nodular mass with an irregular contour extends the perimeter of   posterior mitral valve leaflet measuring approximately 27 mm x 10 mm.    Posterior mitral valve leaflet excursion appears decreased secondary to   the associated mass resulting in turbulent mitral inflow.  The   differential diagnosis includes rheumatoid nodules with overlying   thrombus, superimposed infective endocarditis and tumor.  2.  Right ventricle appears qualitatively dilated and hypokinetic.  The   interventricular septum flattens in early diastole consistent with right   ventricular volume overload.  3.  Small to moderate bilateral pleural effusions.

## 2017-12-13 NOTE — PROGRESS NOTE ADULT - ASSESSMENT
76 yo F with RA, Asthma, pulmonary HTN, HLD who presented to [facility] with weakness and dyspnea. Found to have large LV mass, transferred to [facility] for further mgmt. Further findings of acute/subacute CVA concerning for cardioembolic source, CT chest with pulmonary edema. ESBL E.Coli UTI. Severe mitral stenosis 2nd mass. RA with mobile echodensities.

## 2017-12-13 NOTE — PROGRESS NOTE ADULT - SUBJECTIVE AND OBJECTIVE BOX
HPI:from adm H+P:  78 yo F with rheumatoid arthritis, asthma, pulmomary HTN, HLD who presented to [facility] with weakness and dyspnea. The patient endorses that she was in her usual state of health until a few weeks ago when she developed back and shoulder pain after moving furniture was treated with prednisone.  Her symptoms did not resolve and received treatment with prednisone.  She was persistently feeling unwell and presented to the ER where she received hydration and was treated for a UTI and released.   She became concerned as she is typically very active and spends her summer at a swim club and can typically ascend stairs without dyspnea.  She returned to the emergency room at [facility] and underewent a TTE revealed an echodensity noted at the base of the posterior wall of the LV and attached to LV septum of unclear etiology.    Transferred to [facility] for further management.  Consults at [facility]:   Pulmonary  ID  Cardiology (06 Dec 2017 23:42)  Geriatrics/Family Med:Dr Berrios [phone]      Interval Events  had MRI, no rpt yet, disc c Dr hardin, cardio now, suspect MRI inc study again, if insuff Dx info may need Sx opinion again re possibility of tissue BX/Dx  feels better , no shoulder pain, no SOB, has been oob and ambulated , starting to want to go home because she doesnt feel sick  labs ok  ?daily coags? on lovenox for now  tele nsr , one apc    MEDICATIONS  (STANDING):  aspirin 325 milliGRAM(s) Oral daily  atorvastatin 40 milliGRAM(s) Oral at bedtime  buDESOnide 160 MICROgram(s)/formoterol 4.5 MICROgram(s) Inhaler 2 Puff(s) Inhalation two times a day  enoxaparin Injectable 60 milliGRAM(s) SubCutaneous every 12 hours  furosemide    Tablet 20 milliGRAM(s) Oral daily  levothyroxine 50 MICROGram(s) Oral daily  loratadine 10 milliGRAM(s) Oral daily  metoprolol     tartrate 12.5 milliGRAM(s) Oral two times a day  pantoprazole    Tablet 40 milliGRAM(s) Oral before breakfast    MEDICATIONS  (PRN):  acetaminophen   Tablet 650 milliGRAM(s) Oral every 6 hours PRN For Temp greater than 38.5 C (101.3 F)  acetaminophen   Tablet. 650 milliGRAM(s) Oral every 6 hours PRN Mild Pain (1 - 3)  ALBUTerol    90 MICROgram(s) HFA Inhaler 2 Puff(s) Inhalation every 6 hours PRN Shortness of Breath and/or Wheezing      Patient is a 77y old  Female who presents with a chief complaint of transfer for further management (10 Dec 2017 08:52)      REVIEW OF SYSTEMS    General:feels betterm, no co  Skin/Breast:  Ophthalmologic:  ENMT:	  Respiratory and Thorax: no sob, no cough no sp  Cardiovascular:no cp palp   Gastrointestinal:no nvcd  Genitourinary:no fdi  Musculoskeletal:	no shoulder pain no other pain  Neurological:	no deficits  Psychiatric:Hx anxiety, claustofobia sinc e childhood (didnt tolerate MRI)	  Hematology/Lymphatics:	  Endocrine:	no poly udd  Allergic/Immunologic:  AOSN	+      Vital Signs Last 24 Hrs  T(C): 36.5 (13 Dec 2017 04:49), Max: 36.5 (13 Dec 2017 04:49)  T(F): 97.7 (13 Dec 2017 04:49), Max: 97.7 (13 Dec 2017 04:49)  HR: 97 (13 Dec 2017 04:49) (80 - 97)  BP: 125/76 (13 Dec 2017 04:49) (105/69 - 125/76)  BP(mean): --  RR: 18 (13 Dec 2017 04:49) (18 - 18)  SpO2: 95% (13 Dec 2017 04:49) (94% - 95%)    PHYSICAL EXAM:    Constitutional:vssa, nad,   H+N ncat  Eyes:bebe cwnl  ENMT:  Neck:  Breasts:  Back:  Respiratory:ctab L, min rales r base , end insp wheeze r cleared after first deep breath  Cardiovascular: rrr no m  Gastrointestinal:bs+ soft nt  Genitourinary:  Rectal:  Extremities:no cce  Vascular:H/M-, v v -  Neurological:gr nl  Skin:  Lymph Nodes:  Musculoskeletal:gr nl , atmae no pain  Psychiatric:    LABS  CBC Full  -  ( 13 Dec 2017 05:21 )  WBC Count : 13.8 K/uL  Hemoglobin : 10.1 g/dL  Hematocrit : 31.1 %  Platelet Count - Automated : 687 K/uL  Mean Cell Volume : 90.5 fl  Mean Cell Hemoglobin : 29.3 pg  Mean Cell Hemoglobin Concentration : 32.3 gm/dL  Auto Neutrophil # : 10.3 K/uL  Auto Lymphocyte # : 2.4 K/uL  Auto Monocyte # : 0.8 K/uL  Auto Eosinophil # : 0.2 K/uL  Auto Basophil # : 0.0 K/uL  Auto Neutrophil % : 75.1 %  Auto Lymphocyte % : 17.1 %  Auto Monocyte % : 6.1 %  Auto Eosinophil % : 1.6 %  Auto Basophil % : 0.1 %      12-13    142  |  102  |  12  ----------------------------<  100<H>  4.1   |  30  |  0.84    Ca    8.4      13 Dec 2017 05:21        PT/INR - ( 13 Dec 2017 05:21 )   PT: 13.6 sec;   INR: 1.24 ratio         PTT - ( 12 Dec 2017 08:44 )  PTT:35.4 sec    Imaging:    Xray:    Echo:    CT:    MRI:    Tele:NSR APC    Orders:    MIRI Berrios [phone]

## 2017-12-13 NOTE — PROGRESS NOTE ADULT - SUBJECTIVE AND OBJECTIVE BOX
Follow-up Pulm Progress Note    No new respiratory events overnight.  Denies SOB/CP.   Patient states she was able to tolerate MRI, doesn't understand why the images were poor  97% on RA    Medications:  MEDICATIONS  (STANDING):  aspirin 325 milliGRAM(s) Oral daily  atorvastatin 40 milliGRAM(s) Oral at bedtime  buDESOnide 160 MICROgram(s)/formoterol 4.5 MICROgram(s) Inhaler 2 Puff(s) Inhalation two times a day  enoxaparin Injectable 60 milliGRAM(s) SubCutaneous every 12 hours  furosemide    Tablet 20 milliGRAM(s) Oral daily  levothyroxine 50 MICROGram(s) Oral daily  loratadine 10 milliGRAM(s) Oral daily  metoprolol     tartrate 12.5 milliGRAM(s) Oral two times a day  pantoprazole    Tablet 40 milliGRAM(s) Oral before breakfast    MEDICATIONS  (PRN):  acetaminophen   Tablet 650 milliGRAM(s) Oral every 6 hours PRN For Temp greater than 38.5 C (101.3 F)  acetaminophen   Tablet. 650 milliGRAM(s) Oral every 6 hours PRN Mild Pain (1 - 3)  ALBUTerol    90 MICROgram(s) HFA Inhaler 2 Puff(s) Inhalation every 6 hours PRN Shortness of Breath and/or Wheezing          Vital Signs Last 24 Hrs  T(C): 36.5 (13 Dec 2017 04:49), Max: 36.5 (13 Dec 2017 04:49)  T(F): 97.7 (13 Dec 2017 04:49), Max: 97.7 (13 Dec 2017 04:49)  HR: 97 (13 Dec 2017 04:49) (80 - 97)  BP: 125/76 (13 Dec 2017 04:49) (105/69 - 125/76)  BP(mean): --  RR: 18 (13 Dec 2017 04:49) (18 - 18)  SpO2: 95% (13 Dec 2017 04:49) (94% - 95%) on RA          12-12 @ 07:01  -  12-13 @ 07:00  --------------------------------------------------------  IN: 860 mL / OUT: 500 mL / NET: 360 mL          LABS:                        10.1   13.8  )-----------( 687      ( 13 Dec 2017 05:21 )             31.1     12-13    142  |  102  |  12  ----------------------------<  100<H>  4.1   |  30  |  0.84    Ca    8.4      13 Dec 2017 05:21            CAPILLARY BLOOD GLUCOSE        PT/INR - ( 13 Dec 2017 05:21 )   PT: 13.6 sec;   INR: 1.24 ratio         PTT - ( 12 Dec 2017 08:44 )  PTT:35.4 sec                    CULTURES: (if applicable)          Physical Examination:  PULM: Decreased BS at bases  CVS: S1, S2 RRR    RADIOLOGY REVIEWED  CXR: Bilateral pleural effusions with pulmonary edema Follow-up Pulm Progress Note    No new respiratory events overnight.  Denies SOB/CP.   Patient states she was able to tolerate MRI, doesn't understand why the images were poor  97% on RA    Medications:  MEDICATIONS  (STANDING):  aspirin 325 milliGRAM(s) Oral daily  atorvastatin 40 milliGRAM(s) Oral at bedtime  buDESOnide 160 MICROgram(s)/formoterol 4.5 MICROgram(s) Inhaler 2 Puff(s) Inhalation two times a day  enoxaparin Injectable 60 milliGRAM(s) SubCutaneous every 12 hours  furosemide    Tablet 20 milliGRAM(s) Oral daily  levothyroxine 50 MICROGram(s) Oral daily  loratadine 10 milliGRAM(s) Oral daily  metoprolol     tartrate 12.5 milliGRAM(s) Oral two times a day  pantoprazole    Tablet 40 milliGRAM(s) Oral before breakfast    MEDICATIONS  (PRN):  acetaminophen   Tablet 650 milliGRAM(s) Oral every 6 hours PRN For Temp greater than 38.5 C (101.3 F)  acetaminophen   Tablet. 650 milliGRAM(s) Oral every 6 hours PRN Mild Pain (1 - 3)  ALBUTerol    90 MICROgram(s) HFA Inhaler 2 Puff(s) Inhalation every 6 hours PRN Shortness of Breath and/or Wheezing    Vital Signs Last 24 Hrs  T(C): 36.5 (13 Dec 2017 04:49), Max: 36.5 (13 Dec 2017 04:49)  T(F): 97.7 (13 Dec 2017 04:49), Max: 97.7 (13 Dec 2017 04:49)  HR: 97 (13 Dec 2017 04:49) (80 - 97)  BP: 125/76 (13 Dec 2017 04:49) (105/69 - 125/76)  BP(mean): --  RR: 18 (13 Dec 2017 04:49) (18 - 18)  SpO2: 95% (13 Dec 2017 04:49) (94% - 95%) on RA    12-12 @ 07:01  -  12-13 @ 07:00  --------------------------------------------------------  IN: 860 mL / OUT: 500 mL / NET: 360 mL    LABS:                        10.1   13.8  )-----------( 687      ( 13 Dec 2017 05:21 )             31.1     12-13    142  |  102  |  12  ----------------------------<  100<H>  4.1   |  30  |  0.84    Ca    8.4      13 Dec 2017 05:21    CAPILLARY BLOOD GLUCOSE        PT/INR - ( 13 Dec 2017 05:21 )   PT: 13.6 sec;   INR: 1.24 ratio         PTT - ( 12 Dec 2017 08:44 )  PTT:35.4 sec    CULTURES: (if applicable)      Physical Examination:  PULM: Decreased BS at bases  CVS: S1, S2 RRR    RADIOLOGY REVIEWED  CXR: Bilateral pleural effusions with pulmonary edema

## 2017-12-13 NOTE — PROGRESS NOTE ADULT - ASSESSMENT
disc at [facility] c pt c Dr Eubanks, cardio, MRI rpt p, but he spoke c radio, nmay be incompl study again, may need Sx opinion again  DDx icl tumor v. clot v. RA nodule , etc  on lovenos=x negrito now, Rx depends on Dx  I will speak c PCP again , maybe Rheum also fo for Hx and info  might benefit from H/O conult re Tumor v. clot + poss hypercog state, I will call today  needs to be oob , ambulate, lives alone but has good family support nearby  copd improved

## 2017-12-13 NOTE — PROGRESS NOTE ADULT - PROBLEM SELECTOR PLAN 1
Tumor v. Clot v. RA nodule, etc  fu re MRI, Sx opinion again if MRI inconclusive  cont lovenox for now  H/O re Hypercoag(?) tumor(?)anemia, leukocytosis

## 2017-12-13 NOTE — PROGRESS NOTE ADULT - PROBLEM SELECTOR PLAN 1
likely 2nd severe mitral inflow obstruction 2nd LV mass   -Still with pleural effusions and pulmonary edema on CXR  -Increase Lasix 40mg qd  -CXR in AM

## 2017-12-13 NOTE — PROGRESS NOTE ADULT - ASSESSMENT
A/P: 77 year old woman with pmhx RA, migraines, HLD, hypothyroidism transferred from OSH after mass identified on echocardiogram, confirmed on AMAN at [facility] with large mass L ventricle with mitral obstruction concerning for tumor, and right atrial appendage mobile echodensities. CT head performed which suggested right frontal infarcts. MRI brain performed yesterday disclosed multiple tiny scattered supratentorial, right greater than left and infratentorial, tiny cerebellar, infarcts concerning for embolic etiology. CT chest no PE, right apical opacity, bilateral pleural effusions. Concern for underlining malignancy. Awaiting cardiac MRI. Neurological examination nonlateralizing. NIHSS=0.    Etiology of infarcts likely cardioembolic, in setting of r/o cardiac tumor  less likely endocarditis as negative cultures and no fevers    Cardiac MRI  A nodular mass with an irregular contour extends the perimeter of   posterior mitral valve leaflet measuring approximately 27 mm x 10 mm.    Posterior mitral valve leaflet excursion appears decreased secondary to   the associated mass resulting in turbulent mitral inflow.  The   differential diagnosis includes rheumatoid nodules with overlying   thrombus, superimposed infective endocarditis and tumor.    Neuro stable    Plan:  Continue AC with Lovenox as per cardiology  Issue if can do repeat cardiac MRI  Further management of cardiac mass as per cardiology/CTSurg  Continue Neurochecks  Follow up blood cultures  Continue Abx for UTI  Heme/Onc following  Close observation  Will follow    Plan reviewed with pt at bedside

## 2017-12-13 NOTE — PROGRESS NOTE ADULT - SUBJECTIVE AND OBJECTIVE BOX
CC: Cardiac mass    Interval History: No significant cardiac events overnight. Unable to tolerate MRI.     MEDICATIONS:  acetaminophen   Tablet 650 milliGRAM(s) Oral every 6 hours PRN  acetaminophen   Tablet. 650 milliGRAM(s) Oral every 6 hours PRN  ALBUTerol    90 MICROgram(s) HFA Inhaler 2 Puff(s) Inhalation every 6 hours PRN  aspirin 325 milliGRAM(s) Oral daily  atorvastatin 40 milliGRAM(s) Oral at bedtime  buDESOnide 160 MICROgram(s)/formoterol 4.5 MICROgram(s) Inhaler 2 Puff(s) Inhalation two times a day  enoxaparin Injectable 60 milliGRAM(s) SubCutaneous every 12 hours  furosemide    Tablet 20 milliGRAM(s) Oral daily  levothyroxine 50 MICROGram(s) Oral daily  loratadine 10 milliGRAM(s) Oral daily  metoprolol     tartrate 12.5 milliGRAM(s) Oral two times a day  pantoprazole    Tablet 40 milliGRAM(s) Oral before breakfast      LABS:  12-13    142  |  102  |  12  ----------------------------<  100<H>  4.1   |  30  |  0.84    Ca    8.4      13 Dec 2017 05:21                            10.1   13.8  )-----------( 687      ( 13 Dec 2017 05:21 )             31.1     PT/INR - ( 13 Dec 2017 05:21 )   PT: 13.6 sec;   INR: 1.24 ratio         PTT - ( 12 Dec 2017 08:44 )  PTT:35.4 sec          VITAL SIGNS:   T(C): 36.5 (12-13-17 @ 04:49), Max: 36.5 (12-13-17 @ 04:49)  HR: 97 (12-13-17 @ 04:49) (80 - 97)  BP: 125/76 (12-13-17 @ 04:49) (105/69 - 125/76)  RR: 18 (12-13-17 @ 04:49) (18 - 18)  SpO2: 95% (12-13-17 @ 04:49) (94% - 95%)  Daily     Daily   I&O's Summary    12 Dec 2017 07:01  -  13 Dec 2017 07:00  --------------------------------------------------------  IN: 860 mL / OUT: 500 mL / NET: 360 mL        TELE: brief PAT

## 2017-12-14 VITALS
DIASTOLIC BLOOD PRESSURE: 67 MMHG | RESPIRATION RATE: 18 BRPM | SYSTOLIC BLOOD PRESSURE: 104 MMHG | TEMPERATURE: 97 F | HEART RATE: 79 BPM | OXYGEN SATURATION: 100 %

## 2017-12-14 LAB
ANION GAP SERPL CALC-SCNC: 13 MMOL/L — SIGNIFICANT CHANGE UP (ref 5–17)
APTT BLD: 34.7 SEC — SIGNIFICANT CHANGE UP (ref 27.5–37.4)
BUN SERPL-MCNC: 14 MG/DL — SIGNIFICANT CHANGE UP (ref 7–23)
CALCIUM SERPL-MCNC: 9 MG/DL — SIGNIFICANT CHANGE UP (ref 8.4–10.5)
CHLORIDE SERPL-SCNC: 101 MMOL/L — SIGNIFICANT CHANGE UP (ref 96–108)
CO2 SERPL-SCNC: 26 MMOL/L — SIGNIFICANT CHANGE UP (ref 22–31)
CREAT SERPL-MCNC: 0.9 MG/DL — SIGNIFICANT CHANGE UP (ref 0.5–1.3)
GLUCOSE SERPL-MCNC: 102 MG/DL — HIGH (ref 70–99)
HCT VFR BLD CALC: 32.3 % — LOW (ref 34.5–45)
HGB BLD-MCNC: 10.2 G/DL — LOW (ref 11.5–15.5)
MCHC RBC-ENTMCNC: 27.9 PG — SIGNIFICANT CHANGE UP (ref 27–34)
MCHC RBC-ENTMCNC: 31.6 GM/DL — LOW (ref 32–36)
MCV RBC AUTO: 88.5 FL — SIGNIFICANT CHANGE UP (ref 80–100)
PLATELET # BLD AUTO: 607 K/UL — HIGH (ref 150–400)
POTASSIUM SERPL-MCNC: 4.4 MMOL/L — SIGNIFICANT CHANGE UP (ref 3.5–5.3)
POTASSIUM SERPL-SCNC: 4.4 MMOL/L — SIGNIFICANT CHANGE UP (ref 3.5–5.3)
RBC # BLD: 3.65 M/UL — LOW (ref 3.8–5.2)
RBC # FLD: 18.5 % — HIGH (ref 10.3–14.5)
SODIUM SERPL-SCNC: 140 MMOL/L — SIGNIFICANT CHANGE UP (ref 135–145)
WBC # BLD: 15.15 K/UL — HIGH (ref 3.8–10.5)
WBC # FLD AUTO: 15.15 K/UL — HIGH (ref 3.8–10.5)

## 2017-12-14 PROCEDURE — 87633 RESP VIRUS 12-25 TARGETS: CPT

## 2017-12-14 PROCEDURE — 86900 BLOOD TYPING SEROLOGIC ABO: CPT

## 2017-12-14 PROCEDURE — 76376 3D RENDER W/INTRP POSTPROCES: CPT

## 2017-12-14 PROCEDURE — 81003 URINALYSIS AUTO W/O SCOPE: CPT

## 2017-12-14 PROCEDURE — 80048 BASIC METABOLIC PNL TOTAL CA: CPT

## 2017-12-14 PROCEDURE — 85303 CLOT INHIBIT PROT C ACTIVITY: CPT

## 2017-12-14 PROCEDURE — 87186 SC STD MICRODIL/AGAR DIL: CPT

## 2017-12-14 PROCEDURE — 82533 TOTAL CORTISOL: CPT

## 2017-12-14 PROCEDURE — 86901 BLOOD TYPING SEROLOGIC RH(D): CPT

## 2017-12-14 PROCEDURE — 86147 CARDIOLIPIN ANTIBODY EA IG: CPT

## 2017-12-14 PROCEDURE — 86146 BETA-2 GLYCOPROTEIN ANTIBODY: CPT

## 2017-12-14 PROCEDURE — 85027 COMPLETE CBC AUTOMATED: CPT

## 2017-12-14 PROCEDURE — 71010: CPT | Mod: 26

## 2017-12-14 PROCEDURE — 70553 MRI BRAIN STEM W/O & W/DYE: CPT

## 2017-12-14 PROCEDURE — 85730 THROMBOPLASTIN TIME PARTIAL: CPT

## 2017-12-14 PROCEDURE — 87086 URINE CULTURE/COLONY COUNT: CPT

## 2017-12-14 PROCEDURE — 94640 AIRWAY INHALATION TREATMENT: CPT

## 2017-12-14 PROCEDURE — 87798 DETECT AGENT NOS DNA AMP: CPT

## 2017-12-14 PROCEDURE — 71045 X-RAY EXAM CHEST 1 VIEW: CPT

## 2017-12-14 PROCEDURE — 87581 M.PNEUMON DNA AMP PROBE: CPT

## 2017-12-14 PROCEDURE — 75561 CARDIAC MRI FOR MORPH W/DYE: CPT

## 2017-12-14 PROCEDURE — 83880 ASSAY OF NATRIURETIC PEPTIDE: CPT

## 2017-12-14 PROCEDURE — 97161 PT EVAL LOW COMPLEX 20 MIN: CPT

## 2017-12-14 PROCEDURE — 84145 PROCALCITONIN (PCT): CPT

## 2017-12-14 PROCEDURE — 83735 ASSAY OF MAGNESIUM: CPT

## 2017-12-14 PROCEDURE — 86850 RBC ANTIBODY SCREEN: CPT

## 2017-12-14 PROCEDURE — 85610 PROTHROMBIN TIME: CPT

## 2017-12-14 PROCEDURE — 83036 HEMOGLOBIN GLYCOSYLATED A1C: CPT

## 2017-12-14 PROCEDURE — 87486 CHLMYD PNEUM DNA AMP PROBE: CPT

## 2017-12-14 PROCEDURE — 85307 ASSAY ACTIVATED PROTEIN C: CPT

## 2017-12-14 PROCEDURE — 75557 CARDIAC MRI FOR MORPH: CPT

## 2017-12-14 PROCEDURE — 85306 CLOT INHIBIT PROT S FREE: CPT

## 2017-12-14 PROCEDURE — 99232 SBSQ HOSP IP/OBS MODERATE 35: CPT

## 2017-12-14 PROCEDURE — C8925: CPT

## 2017-12-14 PROCEDURE — 84100 ASSAY OF PHOSPHORUS: CPT

## 2017-12-14 PROCEDURE — C8929: CPT

## 2017-12-14 PROCEDURE — 71275 CT ANGIOGRAPHY CHEST: CPT

## 2017-12-14 PROCEDURE — A9585: CPT

## 2017-12-14 RX ORDER — BUDESONIDE AND FORMOTEROL FUMARATE DIHYDRATE 160; 4.5 UG/1; UG/1
2 AEROSOL RESPIRATORY (INHALATION)
Qty: 0 | Refills: 0 | COMMUNITY
Start: 2017-12-14

## 2017-12-14 RX ORDER — BUDESONIDE AND FORMOTEROL FUMARATE DIHYDRATE 160; 4.5 UG/1; UG/1
0 AEROSOL RESPIRATORY (INHALATION)
Qty: 0 | Refills: 0 | COMMUNITY
Start: 2017-12-14

## 2017-12-14 RX ORDER — FUROSEMIDE 40 MG
1 TABLET ORAL
Qty: 30 | Refills: 0 | OUTPATIENT
Start: 2017-12-14 | End: 2018-01-12

## 2017-12-14 RX ORDER — ENOXAPARIN SODIUM 100 MG/ML
90 INJECTION SUBCUTANEOUS
Qty: 30 | Refills: 0 | OUTPATIENT
Start: 2017-12-14 | End: 2018-01-12

## 2017-12-14 RX ORDER — PANTOPRAZOLE SODIUM 20 MG/1
1 TABLET, DELAYED RELEASE ORAL
Qty: 0 | Refills: 0 | COMMUNITY
Start: 2017-12-14

## 2017-12-14 RX ORDER — PANTOPRAZOLE SODIUM 20 MG/1
1 TABLET, DELAYED RELEASE ORAL
Qty: 30 | Refills: 0 | OUTPATIENT
Start: 2017-12-14 | End: 2018-01-12

## 2017-12-14 RX ORDER — METOPROLOL TARTRATE 50 MG
0.5 TABLET ORAL
Qty: 30 | Refills: 0 | OUTPATIENT
Start: 2017-12-14

## 2017-12-14 RX ORDER — FUROSEMIDE 40 MG
1 TABLET ORAL
Qty: 0 | Refills: 0 | COMMUNITY
Start: 2017-12-14

## 2017-12-14 RX ADMIN — ENOXAPARIN SODIUM 60 MILLIGRAM(S): 100 INJECTION SUBCUTANEOUS at 05:41

## 2017-12-14 RX ADMIN — BUDESONIDE AND FORMOTEROL FUMARATE DIHYDRATE 2 PUFF(S): 160; 4.5 AEROSOL RESPIRATORY (INHALATION) at 05:41

## 2017-12-14 RX ADMIN — Medication 50 MICROGRAM(S): at 05:42

## 2017-12-14 RX ADMIN — Medication 325 MILLIGRAM(S): at 12:41

## 2017-12-14 RX ADMIN — PANTOPRAZOLE SODIUM 40 MILLIGRAM(S): 20 TABLET, DELAYED RELEASE ORAL at 05:42

## 2017-12-14 RX ADMIN — Medication 40 MILLIGRAM(S): at 05:49

## 2017-12-14 RX ADMIN — Medication 12.5 MILLIGRAM(S): at 05:42

## 2017-12-14 NOTE — CONSULT NOTE ADULT - SUBJECTIVE AND OBJECTIVE BOX
Chief Complaint:    HPI:  76 yo F with rheumatoid arthritis, asthma, pulmomary HTN, HLD who presented to [facility] with weakness and dyspnea. The patient endorses that she was in her usual state of health until a few weeks ago when she developed back and shoulder pain after moving furniture was treated with prednisone.  Her symptoms did not resolve and received treatment with prednisone.  She was persistently feeling unwell and presented to the ER where she received hydration and was treated for a UTI and released.   She became concerned as she is typically very active and spends her summer at a swim club and can typically ascend stairs without dyspnea.  She returned to the emergency room at [facility] and underewent a TTE revealed an echodensity noted at the base of the posterior wall of the LV and attached to LV septum of unclear etiology.    Transferred to [facility] for further management.    It is unclear at this point whether cardiac lesion represents mitral annular calcification, thrombus, degenerating rheumatoid nodule, or tumor. An MRI has been attempted twice but unsuccessful due to claustrophobia and suboptimal response to sedation. An MRI revealed multiple cerebral embolic strokes, although no neurological symptoms. Started on Lovenox.     Currently feels well and wants to go home.       PAST MEDICAL & SURGICAL HISTORY:  Rheumatoid arthritis  Migraines  HLD (hyperlipidemia)  Hypothyroid  No significant past surgical history      SOCIAL HISTORY:  Smoking - Non smoker   Alcohol - Social  Drugs - No drug use    FAMILY HISTORY:  Family history of hypertension (Mother)      MEDICATIONS  (STANDING):  aspirin 325 milliGRAM(s) Oral daily  atorvastatin 40 milliGRAM(s) Oral at bedtime  buDESOnide 160 MICROgram(s)/formoterol 4.5 MICROgram(s) Inhaler 2 Puff(s) Inhalation two times a day  enoxaparin Injectable 60 milliGRAM(s) SubCutaneous every 12 hours  furosemide    Tablet 40 milliGRAM(s) Oral daily  levothyroxine 50 MICROGram(s) Oral daily  loratadine 10 milliGRAM(s) Oral daily  metoprolol     tartrate 12.5 milliGRAM(s) Oral two times a day  pantoprazole    Tablet 40 milliGRAM(s) Oral before breakfast    MEDICATIONS  (PRN):  acetaminophen   Tablet 650 milliGRAM(s) Oral every 6 hours PRN For Temp greater than 38.5 C (101.3 F)  acetaminophen   Tablet. 650 milliGRAM(s) Oral every 6 hours PRN Mild Pain (1 - 3)  ALBUTerol    90 MICROgram(s) HFA Inhaler 2 Puff(s) Inhalation every 6 hours PRN Shortness of Breath and/or Wheezing      Vital Signs Last 24 Hrs  T(C): 36.4 (14 Dec 2017 05:08), Max: 36.4 (13 Dec 2017 13:38)  T(F): 97.5 (14 Dec 2017 05:08), Max: 97.5 (13 Dec 2017 13:38)  HR: 107 (14 Dec 2017 05:08) (91 - 107)  BP: 138/85 (14 Dec 2017 05:08) (109/71 - 138/85)  BP(mean): --  RR: 17 (14 Dec 2017 05:08) (17 - 18)  SpO2: 94% (14 Dec 2017 05:08) (94% - 95%)      PHYSICAL EXAM:      Constitutional: well-appearing    Eyes:anicteric    ENMT:    Neck:    Lymph nodes:none    Respiratory: faint basilar rales    Cardiovascular:RRR    Gastrointestinal:nontender. No HSM    Extremities:no edema    Skin:                    LABS:                          10.2   15.15 )-----------( 607      ( 14 Dec 2017 07:27 )             32.3         Mean Cell Volume : 88.5 fl  Mean Cell Hemoglobin : 27.9 pg  Mean Cell Hemoglobin Concentration : 31.6 gm/dL  Auto Neutrophil # : x  Auto Lymphocyte # : x  Auto Monocyte # : x  Auto Eosinophil # : x  Auto Basophil # : x  Auto Neutrophil % : x  Auto Lymphocyte % : x  Auto Monocyte % : x  Auto Eosinophil % : x  Auto Basophil % : x      Serial CBC's  12-14 @ 07:27  Hct-32.3 / Hgb-10.2 / Plat-607 / RBC-3.65 / WBC-15.15  Serial CBC's  12-13 @ 05:21  Hct-31.1 / Hgb-10.1 / Plat-687 / RBC-3.44 / WBC-13.8  Serial CBC's  12-12 @ 08:44  Hct-31.9 / Hgb-10.0 / Plat-701 / RBC-3.64 / WBC-14.69  Serial CBC's  12-11 @ 05:56  Hct-29.9 / Hgb-9.6 / Plat-656 / RBC-3.34 / WBC-16.6      12-14    140  |  101  |  14  ----------------------------<  102<H>  4.4   |  26  |  0.90    Ca    9.0      14 Dec 2017 07:48        PT/INR - ( 13 Dec 2017 05:21 )   PT: 13.6 sec;   INR: 1.24 ratio         PTT - ( 14 Dec 2017 07:37 )  PTT:34.7 sec

## 2017-12-14 NOTE — CONSULT NOTE ADULT - PROBLEM SELECTOR PROBLEM 1
CVA (cerebral vascular accident)
Cardiac mass
Thrombus of right atrial appendage without antecedent myocardial infarction
Dyspnea

## 2017-12-14 NOTE — PROGRESS NOTE ADULT - ASSESSMENT
77 F with cardiac mass  ·	Unable to characterize mass as patient unable to tolerate MRI. Additionally after speaking with CTS there is no direct way to biopsy this lesion. One option is to arrange for PET scan as outpatient. If lesion is hypermetabolic and suggestive of malignancy, unlikely to be resectable. If it is not hypermetabolic it may represent thrombus or rheumatoid lesion. If this is the case can reconsider surgical intervention  ·	Continue A/C. Lovenox or possibly warfarin.   ·	Continue lasix and beta blocker.

## 2017-12-14 NOTE — CONSULT NOTE ADULT - PROVIDER SPECIALTY LIST ADULT
Cardiology
Family Medicine
Heme/Onc
Heme/Onc
Infectious Disease
Pulmonology
Internal Medicine
Neurology

## 2017-12-14 NOTE — PROGRESS NOTE ADULT - ASSESSMENT
78 yo F with RA, Asthma, pulmonary HTN, HLD who presented to [facility] with weakness and dyspnea. Found to have large LV mass, transferred to [facility] for further mgmt. Further findings of acute/subacute CVA concerning for cardioembolic source, CT chest with pulmonary edema. ESBL E.Coli UTI. Severe mitral stenosis 2nd mass. RA with mobile echodensities.

## 2017-12-14 NOTE — PROGRESS NOTE ADULT - PROBLEM SELECTOR PROBLEM 7
Hypothyroid
Prophylactic measure
Prophylactic measure
Asthma
CVA (cerebral vascular accident)
Prophylactic measure
Pulmonary edema

## 2017-12-14 NOTE — PROGRESS NOTE ADULT - PROBLEM SELECTOR PLAN 4
-Symbicort BID    at discharge, pt to resume home med, encruse lyndsay  f/u Dr arita in 2 weeks  d/w family
-Duoneb q6  -Pulmicort BID
-Duoneb q6  -Pulmicort BID
need MRI, Dx ?
on Abx
-Duoneb q6  -Pulmicort BID
-Symbicort BID
Elevated WBC count   Patient was receiving Oral prednisone for 7 days  CALLED PHARMACY PATIENT WAS RECEIVING A COURSE OF PREDNISONE 20MG FOR 2 DAYS THEN 10MG FOR 2 DAYS AND 5 MG FOR 3 DAYS  Received Stress Dose of steroids due to low blood pressure  Dr Trinidad Consulted  ck cortisol level in am ? adrenal insufficiency
H/O
change to: symbicort 160/4.5 mcg 1 puff q12
lifelong Hx may mitigate use of MRI
no today   pulmonary fu

## 2017-12-14 NOTE — PROGRESS NOTE ADULT - SUBJECTIVE AND OBJECTIVE BOX
HPI:from adm H+P:  76 yo F with rheumatoid arthritis, asthma, pulmomary HTN, HLD who presented to [facility] with weakness and dyspnea. The patient endorses that she was in her usual state of health until a few weeks ago when she developed back and shoulder pain after moving furniture was treated with prednisone.  Her symptoms did not resolve and received treatment with prednisone.  She was persistently feeling unwell and presented to the ER where she received hydration and was treated for a UTI and released.   She became concerned as she is typically very active and spends her summer at a swim club and can typically ascend stairs without dyspnea.  She returned to the emergency room at [facility] and underewent a TTE revealed an echodensity noted at the base of the posterior wall of the LV and attached to LV septum of unclear etiology.    Transferred to [facility] for further management.  Consults at [facility]:   Pulmonary  ID  Cardiology (06 Dec 2017 23:42)      Interval Events  labs same, elev wbc, anemia stable  repeat MRI failed   reppeat UA neg, H/O called but didnt see pt yeaterday, I called againthis am  pt wants to go home, feels o3 sec PAT, asymptneed Dx cardiac mass  lasix incr        MEDICATIONS  (STANDING):  aspirin 325 milliGRAM(s) Oral daily  atorvastatin 40 milliGRAM(s) Oral at bedtime  buDESOnide 160 MICROgram(s)/formoterol 4.5 MICROgram(s) Inhaler 2 Puff(s) Inhalation two times a day  enoxaparin Injectable 60 milliGRAM(s) SubCutaneous every 12 hours  furosemide    Tablet 40 milliGRAM(s) Oral daily  levothyroxine 50 MICROGram(s) Oral daily  loratadine 10 milliGRAM(s) Oral daily  metoprolol     tartrate 12.5 milliGRAM(s) Oral two times a day  pantoprazole    Tablet 40 milliGRAM(s) Oral before breakfast    MEDICATIONS  (PRN):  acetaminophen   Tablet 650 milliGRAM(s) Oral every 6 hours PRN For Temp greater than 38.5 C (101.3 F)  acetaminophen   Tablet. 650 milliGRAM(s) Oral every 6 hours PRN Mild Pain (1 - 3)  ALBUTerol    90 MICROgram(s) HFA Inhaler 2 Puff(s) Inhalation every 6 hours PRN Shortness of Breath and/or Wheezing      Patient is a 77y old  Female who presents with a chief complaint of transfer for further management (10 Dec 2017 08:52)      REVIEW OF SYSTEMS    General:no co nad  Skin/Breast:  Ophthalmologic: no ch v/h  ENMT:	  Respiratory and Thorax: no cough no sp ,no sob no w  Cardiovascular: no cp no palp  Gastrointestinal:no nvcd  Genitourinary:no fdi  Musculoskeletal:	  Neurological:	no HA/no Dizzyness  Psychiatric:	  Hematology/Lymphatics:	  Endocrine:	  Allergic/Immunologic:  AOSN	+      Vital Signs Last 24 Hrs  T(C): 36.4 (14 Dec 2017 05:08), Max: 36.4 (13 Dec 2017 13:38)  T(F): 97.5 (14 Dec 2017 05:08), Max: 97.5 (13 Dec 2017 13:38)  HR: 107 (14 Dec 2017 05:08) (91 - 107)  BP: 138/85 (14 Dec 2017 05:08) (109/71 - 138/85)  BP(mean): --  RR: 17 (14 Dec 2017 05:08) (17 - 18)  SpO2: 94% (14 Dec 2017 05:08) (94% - 95%)    PHYSICAL EXAM:    Constitutional:vssa nad  H+Nnc at  Eyes:bebe cwnl  ENMT:  Neck:  Breasts:  Back:  Respiratory:ctab norrw  Cardiovascular:rrr nom   Gastrointestinal:bs+ soft nt  Genitourinary:  Rectal:  Extremities:no cce  Vascular:  Neurological:  Skin:  Lymph Nodes:  Musculoskeletal:  Psychiatric:    LABS  CBC Full  -  ( 14 Dec 2017 07:27 )  WBC Count : 15.15 K/uL  Hemoglobin : 10.2 g/dL  Hematocrit : 32.3 %  Platelet Count - Automated : 607 K/uL  Mean Cell Volume : 88.5 fl  Mean Cell Hemoglobin : 27.9 pg  Mean Cell Hemoglobin Concentration : 31.6 gm/dL  Auto Neutrophil # : x  Auto Lymphocyte # : x  Auto Monocyte # : x  Auto Eosinophil # : x  Auto Basophil # : x  Auto Neutrophil % : x  Auto Lymphocyte % : x  Auto Monocyte % : x  Auto Eosinophil % : x  Auto Basophil % : x      12-14    140  |  101  |  14  ----------------------------<  102<H>  4.4   |  26  |  0.90    Ca    9.0      14 Dec 2017 07:48        PT/INR - ( 13 Dec 2017 05:21 )   PT: 13.6 sec;   INR: 1.24 ratio         PTT - ( 14 Dec 2017 07:37 )  PTT:34.7 sec    Imaging:    Xray:    Echo:    CT:    MRI:    Tele:3 sec PAT    Orders:    MIRI Berrios [phone]

## 2017-12-14 NOTE — PROGRESS NOTE ADULT - SUBJECTIVE AND OBJECTIVE BOX
Patient is a 77y old  Female who presents with a chief complaint of transfer for further management (10 Dec 2017 08:52)      SUBJECTIVE / OVERNIGHT EVENTS:  spoke with pt at length with cardiology attg at bedside.  pt unable to tolerate mri with sedation.  As per Dr Landaverde (CT surg)  surgery not an option to determinre disgnosis.  so we have decided best option to determine disgnosis is a pet scan as out pt to r/o malignancy.  will work on discharge on home lovenox.    MEDICATIONS  (STANDING):  aspirin 325 milliGRAM(s) Oral daily  atorvastatin 40 milliGRAM(s) Oral at bedtime  buDESOnide 160 MICROgram(s)/formoterol 4.5 MICROgram(s) Inhaler 2 Puff(s) Inhalation two times a day  enoxaparin Injectable 60 milliGRAM(s) SubCutaneous every 12 hours  furosemide    Tablet 40 milliGRAM(s) Oral daily  levothyroxine 50 MICROGram(s) Oral daily  loratadine 10 milliGRAM(s) Oral daily  metoprolol     tartrate 12.5 milliGRAM(s) Oral two times a day  pantoprazole    Tablet 40 milliGRAM(s) Oral before breakfast    MEDICATIONS  (PRN):  acetaminophen   Tablet 650 milliGRAM(s) Oral every 6 hours PRN For Temp greater than 38.5 C (101.3 F)  acetaminophen   Tablet. 650 milliGRAM(s) Oral every 6 hours PRN Mild Pain (1 - 3)  ALBUTerol    90 MICROgram(s) HFA Inhaler 2 Puff(s) Inhalation every 6 hours PRN Shortness of Breath and/or Wheezing      Vital Signs Last 24 Hrs  T(C): 36.4 (14 Dec 2017 05:08), Max: 36.4 (13 Dec 2017 13:38)  T(F): 97.5 (14 Dec 2017 05:08), Max: 97.5 (13 Dec 2017 13:38)  HR: 107 (14 Dec 2017 05:08) (91 - 107)  BP: 138/85 (14 Dec 2017 05:08) (109/71 - 138/85)  BP(mean): --  RR: 17 (14 Dec 2017 05:08) (17 - 18)  SpO2: 94% (14 Dec 2017 05:08) (94% - 95%)  CAPILLARY BLOOD GLUCOSE        I&O's Summary    13 Dec 2017 07:01  -  14 Dec 2017 07:00  --------------------------------------------------------  IN: 280 mL / OUT: 250 mL / NET: 30 mL    14 Dec 2017 07:01  -  14 Dec 2017 11:28  --------------------------------------------------------  IN: 200 mL / OUT: 300 mL / NET: -100 mL        PHYSICAL EXAM:  GENERAL: NAD, well-developed  HEAD:  Atraumatic, Normocephalic  EYES: EOMI, PERRLA, conjunctiva and sclera clear  NECK: Supple, No JVD  CHEST/LUNG: Clear to auscultation bilaterally; No wheeze  HEART: Regular rate and rhythm; No murmurs, rubs, or gallops  ABDOMEN: Soft, Nontender, Nondistended; Bowel sounds present  EXTREMITIES:  2+ Peripheral Pulses, No clubbing, cyanosis, or edema  PSYCH: AAOx3  NEUROLOGY: non-focal  SKIN: No rashes or lesions    LABS:                        10.2   15.15 )-----------( 607      ( 14 Dec 2017 07:27 )             32.3     12-14    140  |  101  |  14  ----------------------------<  102<H>  4.4   |  26  |  0.90    Ca    9.0      14 Dec 2017 07:48      PT/INR - ( 13 Dec 2017 05:21 )   PT: 13.6 sec;   INR: 1.24 ratio         PTT - ( 14 Dec 2017 07:37 )  PTT:34.7 sec      Urinalysis Basic - ( 13 Dec 2017 20:00 )    Color: Yellow / Appearance: Clear / S.011 / pH: x  Gluc: x / Ketone: Negative  / Bili: Negative / Urobili: Negative mg/dL   Blood: x / Protein: Negative mg/dL / Nitrite: Negative   Leuk Esterase: Negative / RBC: x / WBC x   Sq Epi: x / Non Sq Epi: x / Bacteria: x        RADIOLOGY & ADDITIONAL TESTS:    Imaging Personally Reviewed:    Consultant(s) Notes Reviewed:      Care Discussed with Consultants/Other Providers:

## 2017-12-14 NOTE — PROGRESS NOTE ADULT - PROBLEM SELECTOR PROBLEM 1
Shoulder pain, right
Shoulder pain, right
Cardiac mass
Pulmonary edema
Cardiac mass

## 2017-12-14 NOTE — PROGRESS NOTE ADULT - ASSESSMENT
78 yo F with RA, Asthma, pulmonary HTN, HLD who presented to [facility] with weakness and dyspnea. Found to have large LV mass, transferred to [facility] for further mgmt. Further findings of acute/subacute CVA concerning for cardioembolic source, CT chest with pulmonary edema. ESBL E.Coli UTI. Severe mitral stenosis 2nd mass. RA with mobile echodensities.

## 2017-12-14 NOTE — PROGRESS NOTE ADULT - SUBJECTIVE AND OBJECTIVE BOX
CC: Cardiac mass    Interval History: No significant cardiac events overnight.     MEDICATIONS:  acetaminophen   Tablet 650 milliGRAM(s) Oral every 6 hours PRN  acetaminophen   Tablet. 650 milliGRAM(s) Oral every 6 hours PRN  ALBUTerol    90 MICROgram(s) HFA Inhaler 2 Puff(s) Inhalation every 6 hours PRN  aspirin 325 milliGRAM(s) Oral daily  atorvastatin 40 milliGRAM(s) Oral at bedtime  buDESOnide 160 MICROgram(s)/formoterol 4.5 MICROgram(s) Inhaler 2 Puff(s) Inhalation two times a day  enoxaparin Injectable 60 milliGRAM(s) SubCutaneous every 12 hours  furosemide    Tablet 40 milliGRAM(s) Oral daily  levothyroxine 50 MICROGram(s) Oral daily  loratadine 10 milliGRAM(s) Oral daily  metoprolol     tartrate 12.5 milliGRAM(s) Oral two times a day  pantoprazole    Tablet 40 milliGRAM(s) Oral before breakfast      LABS:  12-14    140  |  101  |  14  ----------------------------<  102<H>  4.4   |  26  |  0.90    Ca    9.0      14 Dec 2017 07:48                            10.2   15.15 )-----------( 607      ( 14 Dec 2017 07:27 )             32.3     PT/INR - ( 13 Dec 2017 05:21 )   PT: 13.6 sec;   INR: 1.24 ratio         PTT - ( 14 Dec 2017 07:37 )  PTT:34.7 sec    VITAL SIGNS:   T(C): 36.3 (12-14-17 @ 13:13), Max: 36.4 (12-14-17 @ 05:08)  HR: 79 (12-14-17 @ 13:13) (79 - 107)  BP: 104/67 (12-14-17 @ 13:13) (104/67 - 138/85)  RR: 18 (12-14-17 @ 13:13) (17 - 18)  SpO2: 100% (12-14-17 @ 13:13) (94% - 100%)  Daily     Daily   I&O's Summary    13 Dec 2017 07:01  -  14 Dec 2017 07:00  --------------------------------------------------------  IN: 280 mL / OUT: 250 mL / NET: 30 mL    14 Dec 2017 07:01  -  14 Dec 2017 15:39  --------------------------------------------------------  IN: 440 mL / OUT: 500 mL / NET: -60 mL        TELE: No significant ectopy

## 2017-12-14 NOTE — PROGRESS NOTE ADULT - PROBLEM SELECTOR PROBLEM 2
Rheumatoid arthritis
Stroke
Stroke
UTI (urinary tract infection)
Rheumatoid arthritis
Rheumatoid arthritis
Stroke
Thrombus of right atrial appendage without antecedent myocardial infarction
Rheumatoid arthritis

## 2017-12-14 NOTE — CONSULT NOTE ADULT - CONSULT REQUESTED DATE/TIME
07-Dec-2017
07-Dec-2017 14:25
07-Dec-2017 18:27
08-Dec-2017 12:29
09-Dec-2017
14-Dec-2017 11:07
08-Dec-2017 14:12
08-Dec-2017 09:44

## 2017-12-14 NOTE — CONSULT NOTE ADULT - PROBLEM SELECTOR RECOMMENDATION 9
agree with full anticoagulation. Consider changing to Xarelto or Eliquis on discharge.  Hypercoagulable workup ordered  Patient will followup as outpatient to discuss results.

## 2017-12-14 NOTE — PROGRESS NOTE ADULT - PROBLEM SELECTOR PLAN 1
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

## 2017-12-14 NOTE — PROGRESS NOTE ADULT - PROBLEM SELECTOR PROBLEM 4
Asthma
Leukocytosis
Leukocytosis
Cardiac mass
UTI (urinary tract infection)
Anemia
Anxiety
Asthma
Leukocytosis
Shortness of breath

## 2017-12-14 NOTE — CONSULT NOTE ADULT - PROBLEM SELECTOR RECOMMENDATION 2
MID, mild asymp CVA frontal, possibly embolic, starting anticoag
No neurological deficits  CT scan of head: Small age indeterminate territorial infarct in the right high frontal   lobe. Mild chronic small vessel ischemic disease.  MRI and MRA pending  PT: home w/ home PT  As per Dr Milner: AMAN 12/7  neuro consulted - Do- mri/mra head & neck ordered -pt. may require valium prior to as she is "claustrophic".
workup as per Cardiology
begin:   duoneb 1 unit dose hhn q6h,   pulmicort 0.5 mg hhn q12 hrs

## 2017-12-14 NOTE — PROGRESS NOTE ADULT - PROBLEM SELECTOR PROBLEM 3
Cardiac mass
Pulmonary hypertension
Pulmonary hypertension
Cardiac mass
Leukocytosis
Cardiac mass
Leukocytosis
Leukocytosis
Pulmonary hypertension
Cardiac mass
Cardiac mass
Asthma

## 2017-12-14 NOTE — PROVIDER CONTACT NOTE (OTHER) - BACKGROUND
Patient presented to [facility] with weakness and dyspnea. LV mass found. Transferred to [facility]. Findings of acute/subacute CVA, pulmonary edema, severe mitral stenosis.

## 2017-12-14 NOTE — CONSULT NOTE ADULT - ASSESSMENT
77 year old woman with longstanding hx of RA admitted with dyspnea after course of prednisone Rx for RA flare, found to have LV lesion of unclear etiology. Also with multiple embolic lesions, concerning for strokes. There is no personal history or Fhx of hypercoagulability.

## 2017-12-14 NOTE — PROGRESS NOTE ADULT - PROBLEM SELECTOR PROBLEM 6
Anemia
Rheumatoid arthritis
Hypothyroid
Hypothyroid
Anxiety
CVA (cerebral vascular accident)
Rheumatoid arthritis
Asthma
Asthma
Hypothyroid
Rheumatoid arthritis

## 2017-12-14 NOTE — PROGRESS NOTE ADULT - PROBLEM SELECTOR PLAN 5
life long since childhood per pt, never medicated, might need ssedation for MRI  if useful for the study
mri brain with: multiple foci in cerebellar and cerebral hemisphere, greatest in Right frontal lobe
declining, on abx off prednisone
trtying to get more info re prior rxn to methotrexate
C/W Statin  Check Lipid Profile in AM: Elevated TG
Etio unclear  chronic Ds?
c/w aspirin
c/w aspirin
mri brain with: multiple foci in cerebellar and cerebral hemisphere, greatest in Right frontal lobe
resolved

## 2017-12-14 NOTE — PROGRESS NOTE ADULT - PROBLEM SELECTOR PLAN 3
-Work up in progress  -Likely will need to repeat cardiac MRI with sedation
?2/2prednisone/UTI?
lizama in progress, cardio considering MRI c sedation if practical for study
-Heme-onc eval pending   -No role for surgery per CTS
-Work up in progress  -Further w/u per cards
-Work up in progress  -Further w/u per cards
-Work up in progress  -Further w/u per cards. pet scan as out pt.
-Work up in progress  -Likely will need to repeat cardiac MRI with sedation
-Work up in progress  -Likely will need to repeat cardiac MRI with sedation
-Work up in progress  -not an infection  -Likely will need to repeat cardiac MRI with sedation
-Work up in progress  -not an infection  -Likely will need to repeat cardiac MRI with sedation
Echo cardiogram significant for Pulmonary HTN  Pulmonary consult called - Dr. Nguyen  chest CT done 12/4
H/O consult called
likely 2/2 steroids and /or UTI, Abx been dcd  ID FU? repeat UA?
-Work up in progress  -May need further imaging to characterize LV mass
-Work up in progress  -Further w/u per cards
no SOB  today, no wheezing, off o2 nasal canula

## 2017-12-14 NOTE — PROGRESS NOTE ADULT - PROBLEM SELECTOR PLAN 6
needs lizama, chronic disease
Formerly on Methotrexate which was stopped 2nd pulmonary toxicity   -Maintained on OTC supplements (Osteo Bi Flex with Tumeric)
possibly embolic from cardiac mass, no residual deficit
ricki persaud need sedation for MRI
C/W Synthroid  Check TSH in AM:WNL
Formerly on Methotrexate which was stopped 2nd pulmonary toxicity   -Maintained on OTC supplements (Osteo Bi Flex with Tumeric)
improved after steroids and anebs in [facility] , stable now off O2
stable, no sob

## 2017-12-14 NOTE — PROGRESS NOTE ADULT - PROBLEM/PLAN-7
DISPLAY PLAN FREE TEXT
intact

## 2017-12-14 NOTE — PROGRESS NOTE ADULT - PROVIDER SPECIALTY LIST ADULT
CT Surgery
Cardiology
Family Medicine
Family Medicine
Infectious Disease
Infectious Disease
Internal Medicine
Neurology
Pulmonology
Cardiology
Pulmonology
Family Medicine

## 2017-12-14 NOTE — PROGRESS NOTE ADULT - ASSESSMENT
need Dx cardiac mass, cardio to FU  CT Sx should fu again  pulm, neuro stable  called heme/onc again, to see pt today re anemia, persistant elev wbc, poss cardiac tumor v. clot, ?hypercoag state? need Dx cardiac mass, cardio to FU  CT Sx should fu again  pulm, neuro stable  called heme/onc again, to see pt today re anemia, persistant elev wbc, poss cardiac tumor v. clot, ?hypercoag state?  disc c Dr Graham, and Dr Middleton

## 2017-12-14 NOTE — PROGRESS NOTE ADULT - RESPIRATORY
detailed exam
Breath Sounds equal & clear to percussion & auscultation, no accessory muscle use
detailed exam
Breath Sounds equal & clear to percussion & auscultation, no accessory muscle use
detailed exam
detailed exam

## 2017-12-14 NOTE — PROGRESS NOTE ADULT - NSHPATTENDINGPLANDISCUSS_GEN_ALL_CORE
pt, H/O pt, H/O Dr Graham and also Dr Middleton pt, H/O Dr Graham and also Dr Middleton, Will ZSpeak c Dr Degroot also

## 2017-12-14 NOTE — PROGRESS NOTE ADULT - PROBLEM SELECTOR PLAN 8
no headache in hospital but sits in dark with eyes covered
see pulm
synthroid
likely embolic, will need ac

## 2017-12-14 NOTE — CHART NOTE - NSCHARTNOTEFT_GEN_A_CORE
Asked to facilitate d/c home by DR Middleton , seen and examined no active complaints , Medications reviewed and reconciled , with DR Middleton,  discharge meds d/w Pt and daughter , reinforced about Lovenox  90 mg daily doses , Pt is being discharged home with close follow up with PCP , Hematology for PET scan and cardiologist . D/C tele , d/c ivl , D/C Pt home with Home care .       Lara Strickland NP-C [id number]

## 2017-12-14 NOTE — PROGRESS NOTE ADULT - SUBJECTIVE AND OBJECTIVE BOX
Follow-up Pulm Progress Note    No new respiratory events overnight.  Denies SOB/CP. o2 sat 96% on ra, unable to complete cardiac mri    Medications:  MEDICATIONS  (STANDING):  aspirin 325 milliGRAM(s) Oral daily  atorvastatin 40 milliGRAM(s) Oral at bedtime  buDESOnide 160 MICROgram(s)/formoterol 4.5 MICROgram(s) Inhaler 2 Puff(s) Inhalation two times a day  enoxaparin Injectable 60 milliGRAM(s) SubCutaneous every 12 hours  furosemide    Tablet 40 milliGRAM(s) Oral daily  levothyroxine 50 MICROGram(s) Oral daily  loratadine 10 milliGRAM(s) Oral daily  metoprolol     tartrate 12.5 milliGRAM(s) Oral two times a day  pantoprazole    Tablet 40 milliGRAM(s) Oral before breakfast    MEDICATIONS  (PRN):  acetaminophen   Tablet 650 milliGRAM(s) Oral every 6 hours PRN For Temp greater than 38.5 C (101.3 F)  acetaminophen   Tablet. 650 milliGRAM(s) Oral every 6 hours PRN Mild Pain (1 - 3)  ALBUTerol    90 MICROgram(s) HFA Inhaler 2 Puff(s) Inhalation every 6 hours PRN Shortness of Breath and/or Wheezing          Vital Signs Last 24 Hrs  T(C): 36.3 (14 Dec 2017 13:13), Max: 36.4 (14 Dec 2017 05:08)  T(F): 97.3 (14 Dec 2017 13:13), Max: 97.5 (14 Dec 2017 05:08)  HR: 79 (14 Dec 2017 13:13) (79 - 107)  BP: 104/67 (14 Dec 2017 13:13) (104/67 - 138/85)  BP(mean): --  RR: 18 (14 Dec 2017 13:13) (17 - 18)  SpO2: 100% (14 Dec 2017 13:13) (94% - 100%)          13 @ 07:01  -  14 @ 07:00  --------------------------------------------------------  IN: 280 mL / OUT: 250 mL / NET: 30 mL          LABS:                        10.2   15.15 )-----------( 607      ( 14 Dec 2017 07:27 )             32.3     12-14    140  |  101  |  14  ----------------------------<  102<H>  4.4   |  26  |  0.90    Ca    9.0      14 Dec 2017 07:48            CAPILLARY BLOOD GLUCOSE        PT/INR - ( 13 Dec 2017 05:21 )   PT: 13.6 sec;   INR: 1.24 ratio         PTT - ( 14 Dec 2017 07:37 )  PTT:34.7 sec  Urinalysis Basic - ( 13 Dec 2017 20:00 )    Color: Yellow / Appearance: Clear / S.011 / pH: x  Gluc: x / Ketone: Negative  / Bili: Negative / Urobili: Negative mg/dL   Blood: x / Protein: Negative mg/dL / Nitrite: Negative   Leuk Esterase: Negative / RBC: x / WBC x   Sq Epi: x / Non Sq Epi: x / Bacteria: x                      CULTURES: (if applicable)          Physical Examination:  PULM: decreased bs bilaterally, no significant sputum production  CVS: S1, S2 heard    RADIOLOGY REVIEWED  CXR: < from: Xray Chest 1 View AP -PORTABLE-Routine (17 @ 05:43) >  IMPRESSION:   Mild pulmonary edema with trace right pleural effusion.    < end of copied text >

## 2017-12-14 NOTE — PROGRESS NOTE ADULT - PROBLEM SELECTOR PLAN 7
stable on synthroid
chronic, no sob, off O2
embolic?  no deficit
IMPROVE Score 2  Heparin S/C
agree c incr lasix

## 2017-12-14 NOTE — PROGRESS NOTE ADULT - ATTENDING COMMENTS
Please page [phone] with questions or call the office [phone].
Please page [phone] with questions or call the office [phone].
Please page [phone] with questions or call the office [phone].
Please page [phone] with questions or call the office [phone].
Please page [phone] with questions or call the office [phone].
Please page [phone] with questions or call the office [phone].
Clots in RA  Clot vs tumor on mitral valve  infarcts vs septic emboli on MRI  ? CT abd/pelvis  cont Lasix
f/u cards for recommendations  increase Lasix
agree with above  await cardiac MRI  doing well on diuresis
await cardiac MRI repeat  lower to lasix 20

## 2017-12-14 NOTE — PROGRESS NOTE ADULT - PROBLEM SELECTOR PROBLEM 5
Anxiety
CVA (cerebral vascular accident)
HLD (hyperlipidemia)
HLD (hyperlipidemia)
Leukocytosis
Rheumatoid arthritis
Anemia
CVA (cerebral vascular accident)
HLD (hyperlipidemia)
Shoulder pain, right

## 2017-12-15 LAB
-  AMPICILLIN: SIGNIFICANT CHANGE UP
-  CIPROFLOXACIN: SIGNIFICANT CHANGE UP
-  NITROFURANTOIN: SIGNIFICANT CHANGE UP
-  TETRACYCLINE: SIGNIFICANT CHANGE UP
-  VANCOMYCIN: SIGNIFICANT CHANGE UP
APCR PPP: 2.44 RATIO — SIGNIFICANT CHANGE UP
CARDIOLIPIN AB SER-ACNC: POSITIVE
CARDIOLIPIN IGM SER-MCNC: 78.4 MPL — HIGH (ref 0–12.5)
CARDIOLIPIN IGM SER-MCNC: 8.9 GPL — SIGNIFICANT CHANGE UP (ref 0–12.5)
CULTURE RESULTS: SIGNIFICANT CHANGE UP
DEPRECATED CARDIOLIPIN IGA SER: 12.4 APL — SIGNIFICANT CHANGE UP (ref 0–12.5)
METHOD TYPE: SIGNIFICANT CHANGE UP
ORGANISM # SPEC MICROSCOPIC CNT: SIGNIFICANT CHANGE UP
ORGANISM # SPEC MICROSCOPIC CNT: SIGNIFICANT CHANGE UP
PROT C ACT/NOR PPP: 98 % — SIGNIFICANT CHANGE UP (ref 74–150)
PROT S FREE AG PPP IA-ACNC: 76 % — SIGNIFICANT CHANGE UP (ref 61–131)
SPECIMEN SOURCE: SIGNIFICANT CHANGE UP

## 2017-12-19 LAB
B2 GLYCOPROT1 AB SER QL: POSITIVE
B2 GLYCOPROT1 IGA SER QL: 7 SAU — SIGNIFICANT CHANGE UP
B2 GLYCOPROT1 IGG SER-ACNC: <5 SGU — SIGNIFICANT CHANGE UP
B2 GLYCOPROT1 IGM SER-ACNC: 13.6 SMU — SIGNIFICANT CHANGE UP

## 2017-12-19 PROCEDURE — 97530 THERAPEUTIC ACTIVITIES: CPT

## 2017-12-19 PROCEDURE — 94640 AIRWAY INHALATION TREATMENT: CPT

## 2017-12-19 PROCEDURE — 86335 IMMUNFIX E-PHORSIS/URINE/CSF: CPT

## 2017-12-19 PROCEDURE — 70551 MRI BRAIN STEM W/O DYE: CPT

## 2017-12-19 PROCEDURE — 84165 PROTEIN E-PHORESIS SERUM: CPT

## 2017-12-19 PROCEDURE — 97116 GAIT TRAINING THERAPY: CPT

## 2017-12-19 PROCEDURE — 93005 ELECTROCARDIOGRAM TRACING: CPT

## 2017-12-19 PROCEDURE — 84155 ASSAY OF PROTEIN SERUM: CPT

## 2017-12-19 PROCEDURE — 99285 EMERGENCY DEPT VISIT HI MDM: CPT | Mod: 25

## 2017-12-19 PROCEDURE — 85610 PROTHROMBIN TIME: CPT

## 2017-12-19 PROCEDURE — 84156 ASSAY OF PROTEIN URINE: CPT

## 2017-12-19 PROCEDURE — 82550 ASSAY OF CK (CPK): CPT

## 2017-12-19 PROCEDURE — 83735 ASSAY OF MAGNESIUM: CPT

## 2017-12-19 PROCEDURE — 83550 IRON BINDING TEST: CPT

## 2017-12-19 PROCEDURE — 87186 SC STD MICRODIL/AGAR DIL: CPT

## 2017-12-19 PROCEDURE — 84466 ASSAY OF TRANSFERRIN: CPT

## 2017-12-19 PROCEDURE — 84100 ASSAY OF PHOSPHORUS: CPT

## 2017-12-19 PROCEDURE — 82306 VITAMIN D 25 HYDROXY: CPT

## 2017-12-19 PROCEDURE — 70450 CT HEAD/BRAIN W/O DYE: CPT

## 2017-12-19 PROCEDURE — 84484 ASSAY OF TROPONIN QUANT: CPT

## 2017-12-19 PROCEDURE — 85027 COMPLETE CBC AUTOMATED: CPT

## 2017-12-19 PROCEDURE — 80048 BASIC METABOLIC PNL TOTAL CA: CPT

## 2017-12-19 PROCEDURE — 86334 IMMUNOFIX E-PHORESIS SERUM: CPT

## 2017-12-19 PROCEDURE — 74176 CT ABD & PELVIS W/O CONTRAST: CPT

## 2017-12-19 PROCEDURE — 97162 PT EVAL MOD COMPLEX 30 MIN: CPT

## 2017-12-19 PROCEDURE — 87086 URINE CULTURE/COLONY COUNT: CPT

## 2017-12-19 PROCEDURE — 80053 COMPREHEN METABOLIC PANEL: CPT

## 2017-12-19 PROCEDURE — 82553 CREATINE MB FRACTION: CPT

## 2017-12-19 PROCEDURE — 82728 ASSAY OF FERRITIN: CPT

## 2017-12-19 PROCEDURE — 83605 ASSAY OF LACTIC ACID: CPT

## 2017-12-19 PROCEDURE — 81001 URINALYSIS AUTO W/SCOPE: CPT

## 2017-12-19 PROCEDURE — 93306 TTE W/DOPPLER COMPLETE: CPT

## 2017-12-19 PROCEDURE — 85730 THROMBOPLASTIN TIME PARTIAL: CPT

## 2017-12-19 PROCEDURE — 87040 BLOOD CULTURE FOR BACTERIA: CPT

## 2017-12-19 PROCEDURE — 71250 CT THORAX DX C-: CPT

## 2017-12-19 PROCEDURE — 82746 ASSAY OF FOLIC ACID SERUM: CPT

## 2017-12-19 PROCEDURE — 71045 X-RAY EXAM CHEST 1 VIEW: CPT

## 2017-12-19 PROCEDURE — 80061 LIPID PANEL: CPT

## 2017-12-19 PROCEDURE — 97110 THERAPEUTIC EXERCISES: CPT

## 2017-12-19 PROCEDURE — 82607 VITAMIN B-12: CPT

## 2017-12-19 PROCEDURE — 84443 ASSAY THYROID STIM HORMONE: CPT

## 2018-02-01 ENCOUNTER — OTHER (OUTPATIENT)
Age: 78
End: 2018-02-01

## 2018-02-01 ENCOUNTER — CLINICAL ADVICE (OUTPATIENT)
Age: 78
End: 2018-02-01

## 2020-09-15 NOTE — DISCHARGE NOTE ADULT - BECAUSE OF A PHYSICAL, MENTAL OR EMOTIONAL CONDITION, DO YOU HAVE DIFFICULTY DOING  ERRANDS ALONE LIKE VISITING A DOCTOR'S OFFICE OR SHOPPING (15 YEARS AND OLDER)
----- Message from James Castillo MD sent at 9/15/2020 12:37 PM CDT -----  Please contact patient.  INR slightly low.  Any missed doses of warfarin or diet changes   Yes

## 2023-11-14 NOTE — PATIENT PROFILE ADULT. - NS PRO AD PATIENT TYPE
Ordered glycohemoglobin to be done today and will be reviewed and discussed.  Ordered fasting labs comprehensive metabolic panel, glycohemoglobin, lipid panel with reflex, microalbumin urine random to be done one week prior to the next visit.  Continue Lantus 30 units twice daily. Refills provided.  Continue Lispro 20 units, 3 times daily with meal plus sliding scale.  Continue Trulicity 0.75 mg once a week.   Continue Gabapentin 100 mg twice daily for neuropathy.  Advised to ask his ophthalmologist to send a copy of the ophthalmology visit, for records.  Recommend patient to start CGMS.   The patient has A1c is greater than 7%,    The patient takes 3 or more insulin injections per day. The patient frequently self adjusting insulin based on blood glucose reading necessary for optimal blood glucose. Patient has been checking blood sugar more than four times a day for past 90 days . Patient experiences frequent post meal high BG readings.   The pt. demonstrates an understanding of technology and is motivated to use the device correctly and consistently to adhere to the comprehensive diabetes treatment plan and is capable of using the device to recognize alerts and alarms.  Follow up three months with the labs done prior to the visit.  
Health Care Proxy (HCP)

## 2023-11-26 NOTE — PROGRESS NOTE ADULT - RESPIRATORY COMMENTS
Decreased at bases. Nl effort.
decreased at bases. Nl effort.
Decreased BS at bases. Nl effort.
Enrique. Decreased at bases. NL effort.
vision changes

## 2023-12-09 NOTE — ED PROVIDER NOTE - ST/T WAVE
Rapid response called yesterday for septic shock in which patient was markedly hypotensive and tachycardic requiring fluid resuscitation following aspiration event.  GOC conversation held with family and palliative team.   - patient continues to be DNR/DNI, now MEWS exempt   - family wishes to pursue symptom directed care but continue with Abx for management of infection   - no escalation of care is to be had to ICU for pressors etc but will continue with Zosyn and Vancomycin empric dosing no st elevation

## 2024-10-18 NOTE — ED ADULT NURSE NOTE - NSSISCREENINGQ2_ED_A_ED
-Use the cough syrup at night and the prescription cough capsules during the day  -Use mucinex over the counter to help loosen your congestion  -Suggest flu shot once you're better    Return around early December for physical   No

## 2025-07-21 NOTE — DISCHARGE NOTE ADULT - NS AS DC STROKE ANTICOAG CONTRAIN
TIA/ISCHEMIC/HEMORRHAGIC STROKE  Stroke Workup    Your stroke risk factors include:   It is important that you know your risk factors and that you work with your doctor on treatment and lifestyle changes to lower your risk of having a future stroke.     Personal Stroke Risks: Non-Modifiable: Age over 55 years, Family history  Personal Stroke Risks: Modifiable: High blood pressure    It is important that you know your risk factors and that you work with your doctor on treatment and lifestyle changes to lower your risk of having a future stroke.  Lifestyle Recommendations  Heart-Healthy Diet  Mediterranean diet recommended unless otherwise directed by your provider  What is the Mediterranean Diet? Click link to be taken to website or scan QR code below     Decrease intake of saturated fat, most sources of fat should be unsaturated  Increase soluble fiber  Limit sodium intake  If you have diabetes  Aim for meals with consistent and moderate carbohydrates  Avoid sugar sweetened beverages  Simply Good Cookbook (American Stroke Association) Click link to be taken to website or scan QR code below    Limit alcohol intake  Physical activity  Essential for maintaining overall health and well-being, improving cardiovascular fitness, strengthening muscles, and boosting mental health  Balance activity with rest  Manage stress  Get enough sleep  Take medications as scheduled and talk to your provider if there are problems or questions  Avoid tobacco and nicotine products, including smoking and vaping  Toll Free Quit Line [phone]    B.E.  F.A.S.T. is an easy way to remember the signs of stroke.   If someone has sudden onset of any of these symptoms, even if they go away, it is important to call 911 immediately. It is important to make note of the time the symptoms first appeared to receive the best treatment      You were given a thrombolytic medication (clot buster)   No    Stroke Support  Stroke support groups help  survivors and caregivers cope with life after stroke. Support groups connect you with others who can share their experiences and offer support .   Find a Stroke Support Group Click link to be taken to website or scan QR code below      Educational Materials  Stroke Folder  Vaccines  Most Recent Immunizations   Administered Date(s) Administered    COVID Pfizer 12Y+ (Requires Dilution) 03/09/2021     Follow up with your doctor regarding influenza and/or pneumonia vaccine(s).    MEDICATIONS:  See Medication List (bring to your doctor appointments).    VACCINES:  Most Recent Immunizations   Administered Date(s) Administered    COVID Pfizer 12Y+ (Requires Dilution) 03/09/2021       ACTIVITY:  Weigh yourself daily (first thing in the morning, with same amount of clothes on) unless told otherwise by your doctor.  Continue activity as you were in the hospital, slowly increase to what you were doing previously.  Up as desired / no restrictions.    SMOKING:  Avoid all tobacco products and secondhand smoke.  Smoking Cessation Counseling offered.  [place] Toll Free Quit Line: [phone]    DIET:  Limit salt and salty foods unless told otherwise by your doctor.    Trouble breathing or chest pain - CALL 911    CONTACT YOUR DOCTOR IF:  You have symptoms that are not \"normal\" for you.  You have new or worse symptoms or pain, not relieved by medicine or rest.  Temperature greater than 101 degrees F, chills or flu like symptoms.  You gain more than 3 pounds in 2 days.  Increased swelling, redness or drainage.    REFERRALS (Type/Agency/Phone):  Home Health: [facility]        patient does not have atrial fibrillation/flutter